# Patient Record
Sex: FEMALE | Race: WHITE
[De-identification: names, ages, dates, MRNs, and addresses within clinical notes are randomized per-mention and may not be internally consistent; named-entity substitution may affect disease eponyms.]

---

## 2017-01-04 ENCOUNTER — HOSPITAL ENCOUNTER (INPATIENT)
Dept: HOSPITAL 75 - LDRP | Age: 22
LOS: 2 days | Discharge: HOME | End: 2017-01-06
Attending: OBSTETRICS & GYNECOLOGY | Admitting: OBSTETRICS & GYNECOLOGY
Payer: MEDICAID

## 2017-01-04 VITALS — SYSTOLIC BLOOD PRESSURE: 125 MMHG | DIASTOLIC BLOOD PRESSURE: 83 MMHG

## 2017-01-04 VITALS — WEIGHT: 147 LBS | HEIGHT: 62 IN | BODY MASS INDEX: 27.05 KG/M2

## 2017-01-04 VITALS — DIASTOLIC BLOOD PRESSURE: 76 MMHG | SYSTOLIC BLOOD PRESSURE: 111 MMHG

## 2017-01-04 VITALS — SYSTOLIC BLOOD PRESSURE: 124 MMHG | DIASTOLIC BLOOD PRESSURE: 78 MMHG

## 2017-01-04 DIAGNOSIS — O41.03X0: Primary | ICD-10-CM

## 2017-01-04 DIAGNOSIS — Z3A.38: ICD-10-CM

## 2017-01-04 DIAGNOSIS — Z23: ICD-10-CM

## 2017-01-04 LAB
BASOPHILS # BLD AUTO: 0 10^3/UL (ref 0–0.1)
BASOPHILS NFR BLD AUTO: 0 % (ref 0–10)
EOSINOPHIL # BLD AUTO: 0.1 10^3/UL (ref 0–0.3)
EOSINOPHIL NFR BLD AUTO: 1 % (ref 0–10)
ERYTHROCYTE [DISTWIDTH] IN BLOOD BY AUTOMATED COUNT: 12.8 % (ref 10–14.5)
LYMPHOCYTES # BLD AUTO: 1.5 X 10^3 (ref 1–4)
LYMPHOCYTES NFR BLD AUTO: 15 % (ref 12–44)
MCH RBC QN AUTO: 30 PG (ref 25–34)
MCHC RBC AUTO-ENTMCNC: 35 G/DL (ref 32–36)
MCV RBC AUTO: 86 FL (ref 80–99)
MONOCYTES # BLD AUTO: 0.7 X 10^3 (ref 0–1)
MONOCYTES NFR BLD AUTO: 7 % (ref 0–12)
NEUTROPHILS # BLD AUTO: 8 X 10^3 (ref 1.8–7.8)
NEUTROPHILS NFR BLD AUTO: 78 % (ref 42–75)
PLATELET # BLD: 179 10^3/UL (ref 130–400)
PMV BLD AUTO: 11.1 FL (ref 7.4–10.4)
RBC # BLD AUTO: 4.18 10^6/UL (ref 4.35–5.85)
WBC # BLD AUTO: 10.3 10^3/UL (ref 4.3–11)

## 2017-01-04 PROCEDURE — 36415 COLL VENOUS BLD VENIPUNCTURE: CPT

## 2017-01-04 PROCEDURE — 85025 COMPLETE CBC W/AUTO DIFF WBC: CPT

## 2017-01-04 PROCEDURE — 94664 DEMO&/EVAL PT USE INHALER: CPT

## 2017-01-04 PROCEDURE — 86850 RBC ANTIBODY SCREEN: CPT

## 2017-01-04 PROCEDURE — 86901 BLOOD TYPING SEROLOGIC RH(D): CPT

## 2017-01-04 PROCEDURE — 86900 BLOOD TYPING SEROLOGIC ABO: CPT

## 2017-01-04 PROCEDURE — 90715 TDAP VACCINE 7 YRS/> IM: CPT

## 2017-01-04 RX ADMIN — DOCUSATE SODIUM SCH MG: 100 CAPSULE ORAL at 21:01

## 2017-01-04 RX ADMIN — KETOROLAC TROMETHAMINE SCH MG: 30 INJECTION, SOLUTION INTRAMUSCULAR; INTRAVENOUS at 13:00

## 2017-01-04 RX ADMIN — KETOROLAC TROMETHAMINE SCH MG: 30 INJECTION, SOLUTION INTRAMUSCULAR; INTRAVENOUS at 19:13

## 2017-01-04 RX ADMIN — OXYCODONE HYDROCHLORIDE AND ACETAMINOPHEN PRN TAB: 10; 325 TABLET ORAL at 21:02

## 2017-01-04 NOTE — XMS REPORT
Continuity of Care Document

 Created on: 2014



ALEJANDRINA SCHAEFFER

External Reference #: [id number]

: 1995

Sex: Female



Demographics







 Address  [address] [place] [address]

[place] [place]  [address]

 

 Home Phone  [phone]

 

 Preferred Language  Unknown

 

 Marital Status  Unknown

 

 Congregation Affiliation  Unknown

 

 Race  Unknown

 

 Ethnic Group  Unknown





Author







 Author  [facility]

 

 Organization  [facility]

 

 Address  Unknown

 

 Phone  Unavailable



                                                    



Problems

                    





 Problem                          Status                          Onset Date   
                       Classification                          Date Reported   
                       Comments                          Source                
    



                                                                        



Medications

                    





 Medication                          Details                          Route    
                      Status                          Patient Instructions     
                     Ordering Provider                          Order Date     
                     Source                    

 

 Donnatal Extentabs oral tablet, extended release                          1 tab
, PO, Q12H, # 30 tab, 0 Refill(s), [id number]            
                                        Inactive                               
                     [facility]                    

 

 Mircette biphasic oral tablet                          1 tab, PO, Daily, # 28 
tab, 11 Refill(s), Pharmacy [id number]                             
                       Active                                                  
  Formerly [facility]                    

 

 Claritin                          mg                                          
          Active                                                               
                                         [facility]                
    

 

 Singulair                          QPM                                        
            Active                                                             
                                           [facility]              
      



                                                                               
                                                         



Allergies, Adverse Reactions, Alerts

                    





 Substance                          Category                          Reaction 
                         Severity                          Reaction type       
                   Status                          Date Reported               
           Comments                          Source                    



                                                                



Immunizations

                    





 Immunization                          Date Given                          Site
                          Status                          Last Updated         
                 Comments                          Source                    



                                                                        



Results

                    





 Order Name                          Results                          Value    
                      Reference Range                          Date            
              Interpretation                          Comments                 
         Source                    

 

 Patient Viewable Results                          Rapid SS POC (Normal:Negative
)                          Negative 

</br>(2014 10:03:00) <sup> </sup>                                        
              2014                                                       
                       [facility]                    



                        

                                                                        



Vital Signs

                    





 Vital Sign                          Value                          Date       
                   Comments                          Source                    

 

 Diastolic BP                          68 mmHg                          2014                                                    [facility] 
                   

 

 Resp. Rate                          16 BRMIN                          2014                                                    [facility] 
                   

 

 Systolic BP                          110 mmHg                          2014                                                    [facility] 
                   

 

 Heart Rate                          118 bpm                          2014                                                    [facility] 
                   



                                                                               
                                                         



Encounters

                    





 Location                          Location Details                          
Encounter Type                          Encounter Number                       
   Reason For Visit                          Attending Provider                
          ADM Date                          DC Date                          
Status                          Source                    



                                                                        



Procedures

                    





 Procedure                          Code                          Date         
                 Perfomer                          Comments                    
      Source

## 2017-01-04 NOTE — HISTORY & PHYSICAL
History and Physical


this patient is a 20-year-old G1 white female with an EDC of 2070 

putting her at 38-4/7 weeks' gestation today.  She was seen in clinic yesterday 

and found with an NADINE of 48.  She is also been known to have a persistent 

breech presentation and was rachell breech on ultrasound yesterday.  She 

presented today for a scheduled  but was also found to be misha 

every 4-6 minutes.  She denies ruptured membranes or bleeding.  Her history is 

significant for having had B strep growing in her urine in the first trimester 

pregnancy





Allergies are none


Medications are prenatal vitamins


Past medical history, past surgical history, obstetric history, family history, 

and social histories are per the antepartum record





HEENT exam is normal


Neck is supple no lymphadenopathy no thyromegaly


Heart has regular rhythm no murmur


Chest is clear auscultation bilaterally


Abdomen is gravid soft nontender nondistended


Extreme show clubbing cyanosis.  There is no Homans sign.





Pelvic exam is deferred








 Laboratory Tests








Test


  17


09:35 Range/Units


 


 


Basophils # (Auto)


  0.0 


  0.0-0.1


10^3/uL


 


Basophils (%) (Auto) 0  0-10  %


 


Eosinophils # (Auto)


  0.1 


  0.0-0.3


10^3/uL


 


Eosinophils (%) (Auto) 1  0-10  %


 


Hematocrit 36  35-52  %


 


Hemoglobin 12.5  11.5-16.0  G/DL


 


Lymphocytes # (Auto) 1.5  1.0-4.0  X 10^3


 


Lymphocytes (%) (Auto) 15  12-44  %


 


Mean Corpuscular Hemoglobin 30  25-34  PG


 


Mean Corpuscular Hemoglobin


Concent 35 


  32-36  G/DL


 


 


Mean Corpuscular Volume 86  80-99  FL


 


Mean Platelet Volume 11.1 H 7.4-10.4  FL


 


Monocytes # (Auto) 0.7  0.0-1.0  X 10^3


 


Monocytes (%) (Auto) 7  0-12  %


 


Neutrophils # (Auto) 8.0 H 1.8-7.8  X 10^3


 


Neutrophils (%) (Auto) 78 H 42-75  %


 


Platelet Count


  179 


  130-400


10^3/uL


 


Red Blood Count


  4.18 L


  4.35-5.85


10^6/uL


 


Red Cell Distribution Width 12.8  10.0-14.5  %


 


White Blood Count


  10.3 


  4.3-11.0


10^3/uL





lab work is normal.





Assessment and plan   38-4/7 weeks' gestation with oligohydramnios and rachell 

breech presentation and the patient now in early labor.  Plan is for primary 

 delivery.  Surgical risk complication recovering follow-up have been 

fully explained with this patient on several occasions that showed pregnancy in 

breech almost around all of her questions are answered she is ready to proceed


38-4/7 weeks' gestation with oligohydramnios and breech presentation





Allergies and Home Medications


Allergies


Coded Allergies:  


     kiwi (Unverified  Allergy, Unknown, 14)





Home Medications


Ondansetron Hcl 4 Mg Tab #10 4 MG SL Q4H PRN PRN NAUSEA 


   FOR NAUSEA AND VOMITING 


   Prescribed by: RACH LARA on 4/18/15 1907


Trimethoprim/Sulfamethoxazole 1 Ea Tablet 7Days 1 EA PO BID 


   Prescribed by: RACH LARA on 4/18/15 1907





Clinical Quality Measures


DVT/VTE Risk/Contraindication:


Risk Factor Score Per Nursin


RFS Level Per Nursing on Admit:  1=Low/No VTE PPX








NORMAN NEAL MD 2017 12:22 pm

## 2017-01-05 VITALS — SYSTOLIC BLOOD PRESSURE: 115 MMHG | DIASTOLIC BLOOD PRESSURE: 71 MMHG

## 2017-01-05 VITALS — DIASTOLIC BLOOD PRESSURE: 79 MMHG | SYSTOLIC BLOOD PRESSURE: 120 MMHG

## 2017-01-05 VITALS — DIASTOLIC BLOOD PRESSURE: 67 MMHG | SYSTOLIC BLOOD PRESSURE: 110 MMHG

## 2017-01-05 VITALS — DIASTOLIC BLOOD PRESSURE: 68 MMHG | SYSTOLIC BLOOD PRESSURE: 111 MMHG

## 2017-01-05 VITALS — SYSTOLIC BLOOD PRESSURE: 102 MMHG | DIASTOLIC BLOOD PRESSURE: 62 MMHG

## 2017-01-05 VITALS — SYSTOLIC BLOOD PRESSURE: 115 MMHG | DIASTOLIC BLOOD PRESSURE: 70 MMHG

## 2017-01-05 RX ADMIN — KETOROLAC TROMETHAMINE SCH MG: 30 INJECTION, SOLUTION INTRAMUSCULAR; INTRAVENOUS at 06:47

## 2017-01-05 RX ADMIN — OXYCODONE HYDROCHLORIDE AND ACETAMINOPHEN PRN TAB: 10; 325 TABLET ORAL at 23:42

## 2017-01-05 RX ADMIN — IBUPROFEN SCH MG: 800 TABLET, FILM COATED ORAL at 17:12

## 2017-01-05 RX ADMIN — OXYCODONE HYDROCHLORIDE AND ACETAMINOPHEN PRN TAB: 10; 325 TABLET ORAL at 11:35

## 2017-01-05 RX ADMIN — DOCUSATE SODIUM SCH MG: 100 CAPSULE ORAL at 08:19

## 2017-01-05 RX ADMIN — IBUPROFEN SCH MG: 800 TABLET, FILM COATED ORAL at 11:35

## 2017-01-05 RX ADMIN — OXYCODONE HYDROCHLORIDE AND ACETAMINOPHEN PRN TAB: 10; 325 TABLET ORAL at 20:25

## 2017-01-05 RX ADMIN — OXYCODONE HYDROCHLORIDE AND ACETAMINOPHEN PRN TAB: 10; 325 TABLET ORAL at 05:19

## 2017-01-05 RX ADMIN — KETOROLAC TROMETHAMINE SCH MG: 30 INJECTION, SOLUTION INTRAMUSCULAR; INTRAVENOUS at 01:34

## 2017-01-05 RX ADMIN — DOCUSATE SODIUM SCH MG: 100 CAPSULE ORAL at 21:35

## 2017-01-05 RX ADMIN — OXYCODONE HYDROCHLORIDE AND ACETAMINOPHEN PRN TAB: 10; 325 TABLET ORAL at 16:11

## 2017-01-05 RX ADMIN — IBUPROFEN SCH MG: 800 TABLET, FILM COATED ORAL at 23:42

## 2017-01-05 NOTE — OPERATIVE REPORT
PROCEDURE PHYSICIAN:   NORMAN NEAL

 

DATE OF PROCEDURE:  

2017

 

DATE OF DICTATION: 

2017

 

PREOPERATIVE DIAGNOSIS:

38-4/7 weeks gestation with oligohydramnios and breech

presentation. 

 

POSTOPERATIVE DIAGNOSIS: 

38-4/7 weeks gestation with oligohydramnios and breech

presentation.

 

OPERATIVE PROCEDURE:

Primary low transverse  delivery of a viable male infant

with Apgars of 8 and 9 at one and five minutes respectfully.

Weight was 7 pounds, 1 ounce. Cord blood pH was 7.32. Birth time

was 1243. 

 

OPERATIVE DESCRIPTION: 

With the patient in supine position, under satisfactory spinal

anesthesia, she was prepped and draped in the usual fashion for

abdominal surgery. Elena cath was placed in the urinary bladder.

 

 

A Pfannenstiel incision made through the skin with scalpel. The

patient's abdomen entered in the usual manner. Bladder retractor

placed in position, clean scalpel used to make a 4 cm hysterotomy

incision transversely across lower uterine segment. Scant clear

fluid was released on hysterotomy. That incision was extended by

blunt dissection and then a viable male infant was delivered via

the uterine incision from extraction from a rachell breech

presentation. The infant was bulb suctioned on delivery. The cord

was doubly clamped and cut and infant passed to Isabell Stacy, the

pediatric nurse in attendance for delivery. 

 

Cord bloods were obtained including a pH that had a value of

7.32. The placenta then delivered spontaneously Gardner. It was

normal with three-vessel cord. The uterus was exteriorized, the

interior wiped clean with a wet laparotomy sponge. Uterine

incision was then closed with running lock suture of 2-0 Vicryl.

Hemostasis was complete. The uterus was returned to the abdominal

cavity. All blood clot and debris was removed from the abdominal

cavity. With sponge and needle counts correct and hemostasis

assured, the anterior parietal peritoneum and then the rectus

muscles were closed with running suture of 2-0 Vicryl. The rectus

fascia was closed with 2-0 Vicryl. Subcutaneous tissue was closed

with 2-0 Vicryl and the skin was stapled. 

 

Sponge and needle counts were correct at the end of procedure.

Estimated blood loss for the procedure was around 400 mL.  The

patient tolerated the procedure well and was transferred to the

recovery room in stable condition. The infant had been taken

stable to the full term nursery in the care of nurse Stacy. 

 

 

 

Job ID: [id number]

Dictated Date: 2017 13:02:06 

Transcription Date: 2017 12:13:09 / yusra

## 2017-01-05 NOTE — ANESTHESIA-REGIONAL POST-OP
Regional


Patient Condition


Mental Status:  Alert, Oriented x3


Circulation:  Same as Pre-Op


Headache:  Absent


Sensation:  Full Recovery


Motor Block:  Absent





Post Op Complications


Complications


None





Follow Up Care/Instructions


Patient Instructions


None needed.





Anesthesia/Patient Condition


Patient is doing well, no complaints, stable vital signs, no apparent adverse 

anesthesia problems.   


No complications reported per nursing.








MAKEDA AQUINO CRNA Jan 5, 2017 09:35

## 2017-01-05 NOTE — PROGRESS NOTE-STANDARD
Standard Progress Note


Progress Notes/Assess & Plan


Progress/Assessment & Plan


this patient is without complaint.  She is ambulating, she is voiding, 

tolerating by mouth well, has good pain control.  Patient denies chest pain, 

denies shortness of breath, denies nausea vomiting, denies headache.








 Vital Signs








  Date Time  Temp Pulse Resp B/P Pulse Ox O2 Delivery O2 Flow Rate FiO2


 


17 05:20 97.7 107 18 102/62 98 Room Air  


 


17 01:35 98.0 96 18 110/67 98 Room Air  


 


17 20:20 97.6 103 18 111/76 98 Room Air  


 


17 15:40 97.9 93 18 124/78 99 Room Air  


 


17 09:30 98.0 101 18 125/83  Room Air  














 I & O 


 


 17





 07:00


 


Intake Total 4945 ml


 


Output Total 1950 ml


 


Balance 2995 ml





vital signs are stable.  Patient afebrile.





Fundus is firm below the umbilicus and minimally tender.  The incision is clean 

dry and intact.


Extremities show no clubbing cyanosis.  There is no Homans sign.  There is 

slight pretibial pitting edema that is normal.





Assessment and plan   postoperative day number 1 status post primary  

doing well.  We'll plan for routine convalescence care today and consider 

discharge home tomorrow








NORMAN NEAL MD 2017 07:21

## 2017-01-05 NOTE — DISCHARGE INSTRUCTIONS
Discharge Instructions


Discharge Medications


New, Converted or Re-Newed RX:  RX on Chart





Patient Instructions


Patient Instructions:  


as directed


Return to The Hospital For:  


as directed





Activity & Diet


Discharge Diet:  No Restrictions


Activity as Tolerated:  No





Orders-Post D/C & Referrals





Follow Up Appt:


RTC 1 week for incision check.


Call to make follow up appt. for patient in 4 weeks.





Wound Care:


Remove staples, apply benzoin and steri strips.





Activity 


Per routine post  instructions.





Please call in RX to patient pharmacy.





Diet as tolerated





Patient may shower or tub bathe as desired.





Continue home meds








NORMAN NEAL MD 2017 07:23

## 2017-01-06 VITALS — DIASTOLIC BLOOD PRESSURE: 74 MMHG | SYSTOLIC BLOOD PRESSURE: 113 MMHG

## 2017-01-06 VITALS — SYSTOLIC BLOOD PRESSURE: 117 MMHG | DIASTOLIC BLOOD PRESSURE: 76 MMHG

## 2017-01-06 RX ADMIN — DOCUSATE SODIUM SCH MG: 100 CAPSULE ORAL at 12:05

## 2017-01-06 RX ADMIN — IBUPROFEN SCH MG: 800 TABLET, FILM COATED ORAL at 12:05

## 2017-01-06 RX ADMIN — IBUPROFEN SCH MG: 800 TABLET, FILM COATED ORAL at 06:01

## 2017-01-06 RX ADMIN — OXYCODONE HYDROCHLORIDE AND ACETAMINOPHEN PRN TAB: 10; 325 TABLET ORAL at 06:02

## 2017-01-06 NOTE — PROGRESS NOTE-STANDARD
Standard Progress Note


Progress Notes/Assess & Plan


Progress/Assessment & Plan


this patient is without complaint.  She is ambulating, she is voiding, 

tolerating by mouth well, has good pain control.  Patient denies chest pain, 

denies shortness of breath, denies nausea vomiting, denies headache.








 Vital Signs








  Date Time  Temp Pulse Resp B/P Pulse Ox O2 Delivery O2 Flow Rate FiO2


 


17 05:20 97.7 107 18 102/62 98 Room Air  


 


17 01:35 98.0 96 18 110/67 98 Room Air  


 


17 20:20 97.6 103 18 111/76 98 Room Air  


 


17 15:40 97.9 93 18 124/78 99 Room Air  


 


17 09:30 98.0 101 18 125/83  Room Air  














 I & O 


 


 17





 07:00


 


Intake Total 4945 ml


 


Output Total 1950 ml


 


Balance 2995 ml





vital signs are stable.  Patient afebrile.





Fundus is firm below the umbilicus and minimally tender.  The incision is clean 

dry and intact.


Extremities show no clubbing cyanosis.  There is no Homans sign.  There is 

slight pretibial pitting edema that is normal.





Assessment and plan   postoperative day number 1 status post primary  

doing well.  We'll plan for routine convalescence care today and consider 

discharge home tomorrow





2017


Patient is without complaint.  She is ambulating, voiding, tolerating by mouth 

well, has good pain control, denies chest pain, denies shortness of breath, 

denies nausea vomiting, and is requesting discharge home.








 Vital Signs








  Date Time  Temp Pulse Resp B/P Pulse Ox O2 Delivery O2 Flow Rate FiO2


 


17 04:20 97.3 108 18 117/76 99 Room Air  


 


17 21:30 97.8 103 18 115/70 98 Room Air  


 


17 17:12 97.9 107 18 120/79 99 Room Air  


 


17 11:30 97.8 103 18 111/68 99 Room Air  














 I & O 


 


 17





 07:00


 


Intake Total 780 ml


 


Output Total 3150 ml


 


Balance -2370 ml





vital signs are stable.  Patient is afebrile.





Fundus is firm below the umbilicus and nontender.  There is minimal tympany.  

There are bowel sounds present in all 4 quadrants. the incision is clean dry 

and intact


Extremities show clubbing cyanosis.  There is no Homans sign.  There is some 

pretibial pitting edema that is normal.





assessment and plan   is operative day number 2 status post primary  

doing well.  Plan is for discharge home with follow-up in clinic.


Final Diagnosis


38-4/7 weeks' gestation primary  delivery








NORMAN NEAL MD 2017 7:28 am

## 2018-07-03 ENCOUNTER — HOSPITAL ENCOUNTER (EMERGENCY)
Dept: HOSPITAL 75 - ER | Age: 23
Discharge: HOME | End: 2018-07-03
Payer: MEDICAID

## 2018-07-03 VITALS — SYSTOLIC BLOOD PRESSURE: 149 MMHG | DIASTOLIC BLOOD PRESSURE: 73 MMHG

## 2018-07-03 VITALS — BODY MASS INDEX: 17.66 KG/M2 | WEIGHT: 96 LBS | HEIGHT: 62 IN

## 2018-07-03 DIAGNOSIS — N83.291: ICD-10-CM

## 2018-07-03 DIAGNOSIS — O20.0: Primary | ICD-10-CM

## 2018-07-03 DIAGNOSIS — O34.80: ICD-10-CM

## 2018-07-03 DIAGNOSIS — Z3A.00: ICD-10-CM

## 2018-07-03 LAB
ALBUMIN SERPL-MCNC: 5 GM/DL (ref 3.2–4.5)
ALP SERPL-CCNC: 51 U/L (ref 40–136)
ALT SERPL-CCNC: 12 U/L (ref 0–55)
APTT PPP: YELLOW S
BACTERIA #/AREA URNS HPF: NEGATIVE /HPF
BASOPHILS # BLD AUTO: 0 10^3/UL (ref 0–0.1)
BASOPHILS NFR BLD AUTO: 0 % (ref 0–10)
BILIRUB SERPL-MCNC: 1 MG/DL (ref 0.1–1)
BILIRUB UR QL STRIP: NEGATIVE
BUN/CREAT SERPL: 9
CALCIUM SERPL-MCNC: 9.9 MG/DL (ref 8.5–10.1)
CHLORIDE SERPL-SCNC: 106 MMOL/L (ref 98–107)
CO2 SERPL-SCNC: 24 MMOL/L (ref 21–32)
CREAT SERPL-MCNC: 0.68 MG/DL (ref 0.6–1.3)
EOSINOPHIL # BLD AUTO: 0.2 10^3/UL (ref 0–0.3)
EOSINOPHIL NFR BLD AUTO: 3 % (ref 0–10)
ERYTHROCYTE [DISTWIDTH] IN BLOOD BY AUTOMATED COUNT: 12.5 % (ref 10–14.5)
FIBRINOGEN PPP-MCNC: CLEAR MG/DL
GFR SERPLBLD BASED ON 1.73 SQ M-ARVRAT: > 60 ML/MIN
GLUCOSE SERPL-MCNC: 94 MG/DL (ref 70–105)
GLUCOSE UR STRIP-MCNC: NEGATIVE MG/DL
HCT VFR BLD CALC: 43 % (ref 35–52)
HGB BLD-MCNC: 15.2 G/DL (ref 11.5–16)
KETONES UR QL STRIP: (no result)
LEUKOCYTE ESTERASE UR QL STRIP: (no result)
LYMPHOCYTES # BLD AUTO: 1.4 X 10^3 (ref 1–4)
LYMPHOCYTES NFR BLD AUTO: 22 % (ref 12–44)
MANUAL DIFFERENTIAL PERFORMED BLD QL: NO
MCH RBC QN AUTO: 31 PG (ref 25–34)
MCHC RBC AUTO-ENTMCNC: 35 G/DL (ref 32–36)
MCV RBC AUTO: 87 FL (ref 80–99)
MONOCYTES # BLD AUTO: 0.5 X 10^3 (ref 0–1)
MONOCYTES NFR BLD AUTO: 9 % (ref 0–12)
NEUTROPHILS # BLD AUTO: 4.3 X 10^3 (ref 1.8–7.8)
NEUTROPHILS NFR BLD AUTO: 67 % (ref 42–75)
NITRITE UR QL STRIP: NEGATIVE
PH UR STRIP: 7 [PH] (ref 5–9)
PLATELET # BLD: 271 10^3/UL (ref 130–400)
PMV BLD AUTO: 10.8 FL (ref 7.4–10.4)
POTASSIUM SERPL-SCNC: 3.5 MMOL/L (ref 3.6–5)
PROT SERPL-MCNC: 7.5 GM/DL (ref 6.4–8.2)
PROT UR QL STRIP: NEGATIVE
RBC # BLD AUTO: 4.96 10^6/UL (ref 4.35–5.85)
RBC #/AREA URNS HPF: (no result) /HPF
SODIUM SERPL-SCNC: 140 MMOL/L (ref 135–145)
SP GR UR STRIP: 1.01 (ref 1.02–1.02)
SQUAMOUS #/AREA URNS HPF: (no result) /HPF
UROBILINOGEN UR-MCNC: NORMAL MG/DL
WBC # BLD AUTO: 6.3 10^3/UL (ref 4.3–11)
WBC #/AREA URNS HPF: (no result) /HPF

## 2018-07-03 PROCEDURE — 80053 COMPREHEN METABOLIC PANEL: CPT

## 2018-07-03 PROCEDURE — 84702 CHORIONIC GONADOTROPIN TEST: CPT

## 2018-07-03 PROCEDURE — 84703 CHORIONIC GONADOTROPIN ASSAY: CPT

## 2018-07-03 PROCEDURE — 36415 COLL VENOUS BLD VENIPUNCTURE: CPT

## 2018-07-03 PROCEDURE — 85025 COMPLETE CBC W/AUTO DIFF WBC: CPT

## 2018-07-03 PROCEDURE — 76817 TRANSVAGINAL US OBSTETRIC: CPT

## 2018-07-03 PROCEDURE — 81000 URINALYSIS NONAUTO W/SCOPE: CPT

## 2018-07-03 NOTE — ED ABDOMINAL PAIN
General


Chief Complaint:  -Female


Stated Complaint:  POSSIBLY PG,ABD CRAMPING


Source of Information:  Patient


Exam Limitations:  No Limitations





History of Present Illness


Date Seen by Provider:  Jul 3, 2018


Time Seen by Provider:  10:53


Initial Comments


to ER with concerns of possible miscarriage. Her last menstrual period was on 

May 27. Today she started having suprapubic abdominal cramping without vaginal 

discharge. She denies fevers or chills. .  She's had 3 positive home 

pregnancy test.she also reports loose stools for the past couple of days which 

she attributes to irritable bowel from stress.


Timing/Duration:  1-2 Days


Severity/Quality:  Moderate


Location:  Suprapubic


Radiation:  No Radiation


Activities at Onset:  None





Allergies and Home Medications


Allergies


Coded Allergies:  


     kiwi (Unverified  Allergy, Unknown, 14)





Home Medications


Pnv95/Ferrous Fumarate/FA 1 Each Tablet, 1 EACH PO DAILY, (Reported)





Patient Home Medication List


Home Medication List Reviewed:  Yes





Review of Systems


Constitutional:  see HPI


EENTM:  No Symptoms Reported


Respiratory:  No Symptoms Reported


Cardiovascular:  No Symptoms Reported


Gastrointestinal:  See HPI, Abdominal Pain


Musculoskeletal:  no symptoms reported


Skin:  no symptoms reported


Psychiatric/Neurological:  No Symptoms Reported





Past Medical-Social-Family Hx


Patient Social History


Alcohol Use:  Denies Use


Recreational Drug Use:  No


Smoking Status:  Never a Smoker


Recent Foreign Travel:  No


Contact w/Someone Who Travel:  No


Recent Hopitalizations:  No





Immunizations Up To Date


PED Vaccines UTD:  No





Seasonal Allergies


Seasonal Allergies:  Yes





Past Medical History


Surgeries:  Yes


Adenoidectomy, Tonsillectomy


Respiratory:  No


Cardiac:  No


Neurological:  No


Reproductive Disorders:  No


Female Reproductive Disorders:  Denies


Sexually Transmitted Disease:  No


HIV/AIDS:  No


Gastrointestinal:  No


Musculoskeletal:  No


Endocrine:  No


HEENT:  No


Cancer:  No


Psychosocial:  No


Integumentary:  No


Blood Disorders:  No


Adverse Reaction/Blood Tranf:  No





Family Medical History





Not obtainable due to adoption





Physical Exam


Vital Signs





Vital Signs - First Documented








 7/3/18





 10:36


 


Pulse 96


 


Resp 18


 


B/P (MAP) 149/73 (98)


 


Pulse Ox 96


 


O2 Delivery Room Air





Capillary Refill :


General Appearance:  WD/WN, no apparent distress


HEENT:  PERRL/EOMI, normal ENT inspection


Neck:  non-tender, full range of motion


Respiratory:  no respiratory distress, no accessory muscle use


Gastrointestinal:  normal bowel sounds, soft, tenderness (mild suprapubic)


Extremities:  normal range of motion, non-tender


Neurologic/Psychiatric:  alert, normal mood/affect, oriented x 3


Skin:  normal color, warm/dry





Progress/Results/Core Measures


Results/Orders


Lab Results





Laboratory Tests








Test


 7/3/18


10:49 7/3/18


10:55 Range/Units


 


 


White Blood Count


 6.3 


 


 4.3-11.0


10^3/uL


 


Red Blood Count


 4.96 


 


 4.35-5.85


10^6/uL


 


Hemoglobin 15.2   11.5-16.0  G/DL


 


Hematocrit 43   35-52  %


 


Mean Corpuscular Volume 87   80-99  FL


 


Mean Corpuscular Hemoglobin 31   25-34  PG


 


Mean Corpuscular Hemoglobin


Concent 35 


 


 32-36  G/DL





 


Red Cell Distribution Width 12.5   10.0-14.5  %


 


Platelet Count


 271 


 


 130-400


10^3/uL


 


Mean Platelet Volume 10.8 H  7.4-10.4  FL


 


Neutrophils (%) (Auto) 67   42-75  %


 


Lymphocytes (%) (Auto) 22   12-44  %


 


Monocytes (%) (Auto) 9   0-12  %


 


Eosinophils (%) (Auto) 3   0-10  %


 


Basophils (%) (Auto) 0   0-10  %


 


Neutrophils # (Auto) 4.3   1.8-7.8  X 10^3


 


Lymphocytes # (Auto) 1.4   1.0-4.0  X 10^3


 


Monocytes # (Auto) 0.5   0.0-1.0  X 10^3


 


Eosinophils # (Auto)


 0.2 


 


 0.0-0.3


10^3/uL


 


Basophils # (Auto)


 0.0 


 


 0.0-0.1


10^3/uL


 


Sodium Level 140   135-145  MMOL/L


 


Potassium Level 3.5 L  3.6-5.0  MMOL/L


 


Chloride Level 106     MMOL/L


 


Carbon Dioxide Level 24   21-32  MMOL/L


 


Anion Gap 10   5-14  MMOL/L


 


Blood Urea Nitrogen 6 L  7-18  MG/DL


 


Creatinine


 0.68 


 


 0.60-1.30


MG/DL


 


Estimat Glomerular Filtration


Rate > 60 


 


  





 


BUN/Creatinine Ratio 9    


 


Glucose Level 94     MG/DL


 


Calcium Level 9.9   8.5-10.1  MG/DL


 


Total Bilirubin 1.0   0.1-1.0  MG/DL


 


Aspartate Amino Transf


(AST/SGOT) 16 


 


 5-34  U/L





 


Alanine Aminotransferase


(ALT/SGPT) 12 


 


 0-55  U/L





 


Alkaline Phosphatase 51     U/L


 


Total Protein 7.5   6.4-8.2  GM/DL


 


Albumin 5.0 H  3.2-4.5  GM/DL


 


Human Chorionic Gonadotropin,


Quant 1076 H


 


 <5  MIU/ML





 


Urine Color  YELLOW   


 


Urine Clarity  CLEAR   


 


Urine pH  7  5-9  


 


Urine Specific Gravity  1.010 L 1.016-1.022  


 


Urine Protein  NEGATIVE  NEGATIVE  


 


Urine Glucose (UA)  NEGATIVE  NEGATIVE  


 


Urine Ketones  3+ H NEGATIVE  


 


Urine Nitrite  NEGATIVE  NEGATIVE  


 


Urine Bilirubin  NEGATIVE  NEGATIVE  


 


Urine Urobilinogen  NORMAL  NORMAL  MG/DL


 


Urine Leukocyte Esterase  1+ H NEGATIVE  


 


Urine RBC (Auto)  NEGATIVE  NEGATIVE  


 


Urine RBC  NONE   /HPF


 


Urine WBC  RARE   /HPF


 


Urine Squamous Epithelial


Cells 


 0-2 


  /HPF





 


Urine Crystals  NONE   /LPF


 


Urine Bacteria  NEGATIVE   /HPF


 


Urine Casts  NONE   /LPF


 


Urine Mucus  NEGATIVE   /LPF


 


Urine Culture Indicated  NO   








My Orders





Orders - RACH LARA


Cbc With Automated Diff (7/3/18 10:39)


Hcg,Quantitative (7/3/18 10:39)


Comprehensive Metabolic Panel (7/3/18 10:39)


Ua Culture If Indicated (7/3/18 10:39)


Urine Pregnancy Bedside (7/3/18 10:39)


Us Ob Transvaginal [id number] (7/3/18 11:03)





Vital Signs/I&O











 7/3/18





 10:36


 


Pulse 96


 


Resp 18


 


B/P (MAP) 149/73 (98)


 


Pulse Ox 96


 


O2 Delivery Room Air











Departure


Impression





 Primary Impression:  


 Ovarian cyst


 Additional Impression:  


 Threatened  in early pregnancy


Disposition:  01 HOME, SELF-CARE


Condition:  Stable





Departure-Patient Inst.


Decision time for Depature:  11:57


Referrals:  


THEA COOK DO (PCP)


Primary Care Physician








[facility] (Family)


Primary Care Physician


Patient Instructions:  Threatened Miscarriage (DC)





Add. Discharge Instructions:  


. Follow-up with your doctor as scheduled for repeat labs (HCG) at your 

obstetricians discretion


All discharge instructions reviewed with patient and/or family. Voiced 

understanding.











RACH LARA Jul 3, 2018 10:55

## 2018-07-03 NOTE — DIAGNOSTIC IMAGING REPORT
INDICATION: 

Cramping.



TECHNIQUE: 

--------------------



FINDINGS:

The endometrium is thickened at 17 mm. There is a tiny hypoechoic

structure in the endometrium measuring approximately 4 mm in

size. This is indeterminate but could conceivably represent a

very early gestational sac. No fetal pole is seen at this time.

The left ovary measures 1.5 x 1.9 x 2.4 cm. The right ovary

measures 4.4 x 3.2 x 4.6 cm. There is a 2.5 x 3.3 cm simple right

ovarian cyst. No free fluid is seen.



IMPRESSION:

1. There is a 3.3 cm right ovarian cyst. 



2. A tiny hypoechoic structure within the endometrium is too

small to characterize. It is conceivable that this represents a

very early gestational sac. Correlation with Beta hCG levels is

recommended. A followup ultrasound could be performed to confirm

viability.



Dictated by: 



  Dictated on workstation # [id number]

## 2018-07-05 ENCOUNTER — HOSPITAL ENCOUNTER (OUTPATIENT)
Dept: HOSPITAL 75 - LAB | Age: 23
End: 2018-07-05
Attending: OBSTETRICS & GYNECOLOGY
Payer: MEDICAID

## 2018-07-05 DIAGNOSIS — Z3A.01: ICD-10-CM

## 2018-07-05 DIAGNOSIS — R10.2: Primary | ICD-10-CM

## 2018-07-05 PROCEDURE — 84702 CHORIONIC GONADOTROPIN TEST: CPT

## 2018-07-05 PROCEDURE — 84144 ASSAY OF PROGESTERONE: CPT

## 2018-07-05 PROCEDURE — 36415 COLL VENOUS BLD VENIPUNCTURE: CPT

## 2018-07-07 ENCOUNTER — HOSPITAL ENCOUNTER (EMERGENCY)
Dept: HOSPITAL 75 - ER | Age: 23
Discharge: HOME | End: 2018-07-07
Payer: MEDICAID

## 2018-07-07 VITALS — WEIGHT: 96 LBS | BODY MASS INDEX: 17.66 KG/M2 | HEIGHT: 62 IN

## 2018-07-07 VITALS — SYSTOLIC BLOOD PRESSURE: 119 MMHG | DIASTOLIC BLOOD PRESSURE: 85 MMHG

## 2018-07-07 DIAGNOSIS — Z87.59: ICD-10-CM

## 2018-07-07 DIAGNOSIS — Z90.89: ICD-10-CM

## 2018-07-07 DIAGNOSIS — Z3A.01: ICD-10-CM

## 2018-07-07 DIAGNOSIS — O20.0: Primary | ICD-10-CM

## 2018-07-07 LAB
ALBUMIN SERPL-MCNC: 4.8 GM/DL (ref 3.2–4.5)
ALP SERPL-CCNC: 50 U/L (ref 40–136)
ALT SERPL-CCNC: 9 U/L (ref 0–55)
BASOPHILS # BLD AUTO: 0 10^3/UL (ref 0–0.1)
BASOPHILS NFR BLD AUTO: 1 % (ref 0–10)
BILIRUB SERPL-MCNC: 0.7 MG/DL (ref 0.1–1)
BUN/CREAT SERPL: 11
CALCIUM SERPL-MCNC: 9.5 MG/DL (ref 8.5–10.1)
CHLORIDE SERPL-SCNC: 107 MMOL/L (ref 98–107)
CO2 SERPL-SCNC: 25 MMOL/L (ref 21–32)
CREAT SERPL-MCNC: 0.73 MG/DL (ref 0.6–1.3)
EOSINOPHIL # BLD AUTO: 0.2 10^3/UL (ref 0–0.3)
EOSINOPHIL NFR BLD AUTO: 4 % (ref 0–10)
ERYTHROCYTE [DISTWIDTH] IN BLOOD BY AUTOMATED COUNT: 12.5 % (ref 10–14.5)
GFR SERPLBLD BASED ON 1.73 SQ M-ARVRAT: > 60 ML/MIN
GLUCOSE SERPL-MCNC: 124 MG/DL (ref 70–105)
HCT VFR BLD CALC: 39 % (ref 35–52)
HGB BLD-MCNC: 13.8 G/DL (ref 11.5–16)
LYMPHOCYTES # BLD AUTO: 1.9 X 10^3 (ref 1–4)
LYMPHOCYTES NFR BLD AUTO: 32 % (ref 12–44)
MANUAL DIFFERENTIAL PERFORMED BLD QL: NO
MCH RBC QN AUTO: 31 PG (ref 25–34)
MCHC RBC AUTO-ENTMCNC: 35 G/DL (ref 32–36)
MCV RBC AUTO: 87 FL (ref 80–99)
MONOCYTES # BLD AUTO: 0.6 X 10^3 (ref 0–1)
MONOCYTES NFR BLD AUTO: 11 % (ref 0–12)
NEUTROPHILS # BLD AUTO: 3.3 X 10^3 (ref 1.8–7.8)
NEUTROPHILS NFR BLD AUTO: 54 % (ref 42–75)
PLATELET # BLD: 269 10^3/UL (ref 130–400)
PMV BLD AUTO: 10.8 FL (ref 7.4–10.4)
POTASSIUM SERPL-SCNC: 3.3 MMOL/L (ref 3.6–5)
PROT SERPL-MCNC: 7 GM/DL (ref 6.4–8.2)
RBC # BLD AUTO: 4.53 10^6/UL (ref 4.35–5.85)
SODIUM SERPL-SCNC: 141 MMOL/L (ref 135–145)
WBC # BLD AUTO: 6.1 10^3/UL (ref 4.3–11)

## 2018-07-07 PROCEDURE — 84702 CHORIONIC GONADOTROPIN TEST: CPT

## 2018-07-07 PROCEDURE — 85025 COMPLETE CBC W/AUTO DIFF WBC: CPT

## 2018-07-07 PROCEDURE — 84703 CHORIONIC GONADOTROPIN ASSAY: CPT

## 2018-07-07 PROCEDURE — 36415 COLL VENOUS BLD VENIPUNCTURE: CPT

## 2018-07-07 PROCEDURE — 99283 EMERGENCY DEPT VISIT LOW MDM: CPT

## 2018-07-07 PROCEDURE — 80053 COMPREHEN METABOLIC PANEL: CPT

## 2018-07-07 NOTE — ED GU-FEMALE
General


Chief Complaint:  -Female


Stated Complaint:  5 WEEKS PREGNANT AND STARTED BLEEDING


Nursing Triage Note:  


pt reports she started having vag bleeding while [place] the mall.  reports she is 5 


weeks pregnant.


Nursing Sepsis Screen:  No Definite Risk





History of Present Illness


Date Seen by Provider:  2018


Time Seen by Provider:  16:50


Initial Comments


22-year-old  female presents for vaginal bleeding that began 10-20 min 

PTA.  Her last menstrual period was May 27, 2018. She was evaluated here on 7/3/

18 and had a positive hCG with a quantitative of 1,902. Vaginal ultrasound 

showed possible, very early intrau-uterine pregnancy with Right 3.3 ovarian 

cyst.  She reports minimal abdominal cramping with the vaginal bleeding.


Timing/Duration:  just prior to arrival


Severity/Quality:  mild


Location:  suprapubic


Radiation:  none


Activities at Onset:  none


Prior Genitourinary Problems:  none


Modifying Factors:  Improves With Resting


Associated Symptoms:  denies symptoms





Allergies and Home Medications


Allergies


Coded Allergies:  


     kiwi (Unverified  Allergy, Unknown, 14)





Home Medications


Pnv95/Ferrous Fumarate/FA 1 Each Tablet, 1 EACH PO DAILY, (Reported)





Patient Home Medication List


Home Medication List Reviewed:  Yes





Review of Systems


Constitutional:  no symptoms reported, see HPI


Genitourinary:  see HPI, discharge (vaginal bleeding)


Pregnant:  Yes


LMP:  May 27, 2018


All Other Systemes Reviewed


Negative Unless Noted:  Yes





Past Medical-Social-Family Hx


Past Med/Social Hx:  Reviewed Nursing Past Med/Soc Hx


Patient Social History


Alcohol Use:  Denies Use


Recreational Drug Use:  No


Smoking Status:  Never a Smoker


Recent Foreign Travel:  No


Contact w/Someone Who Travel:  No


Recent Infectious Disease Expo:  No


Recent Hopitalizations:  No


Physical Abuse:  No


Sexual Abuse:  No


Mistreated:  No


Fear:  No





Immunizations Up To Date


PED Vaccines UTD:  No





Seasonal Allergies


Seasonal Allergies:  Yes





Past Medical History


Surgeries:  Yes (wisdom teeth)


Adenoidectomy,  Section, Tonsillectomy


Respiratory:  No


Cardiac:  No


Neurological:  No


Reproductive Disorders:  No


Female Reproductive Disorders:  Denies


Sexually Transmitted Disease:  No


HIV/AIDS:  No


Gastrointestinal:  No


Musculoskeletal:  No


Endocrine:  No


HEENT:  No


Cancer:  No


Psychosocial:  No


Nursing Suicide Risk Score:  0


Integumentary:  No


Blood Disorders:  No


Adverse Reaction/Blood Tranf:  No





Family Medical History





Not obtainable due to adoption





Physical Exam


Vital Signs





Vital Signs - First Documented








 18





 16:50


 


Temp 99.9


 


Pulse 127


 


Resp 20


 


B/P (MAP) 140/85 (103)


 


Pulse Ox 98


 


O2 Delivery Room Air





Capillary Refill : Less Than 3 Seconds


Height, Weight, BMI


Height: 5', 2.00"


Weight: 96lbs 0.0oz, 43.662116mk Method:Stated ,26.9BMI


General Appearance:  WD/WN, mild distress (patient anxious regarding vaginal 

bleeding and possible miscarriage, tearful)


Cardiovascular:  normal peripheral pulses, regular rate, rhythm


Respiratory:  chest non-tender, lungs clear, normal breath sounds


Gastrointestinal:  normal bowel sounds, soft, tenderness (trace superpubic 

tenderness)


Extremities:  normal range of motion, non-tender, normal capillary refill


Neurologic/Psychiatric:  no motor/sensory deficits, alert, oriented x 3, 

depressed affect


Skin:  normal color, warm/dry





Progress/Results/Core Measures


Suspected Sepsis


Recent Fever Within 48 Hours:  No


Infection Criteria Present:  None


New/Unexplained  Altered Menta:  No


Sepsis Screen:  No Definite Risk


SIRS


Temperature:99.9 


Pulse: 127 


Respiratory Rate: 20


 


Laboratory Tests


18 17:05: White Blood Count 6.1


Blood Pressure 140 /85 


Mean: 103


 


Laboratory Tests


18 17:05: 


Creatinine 0.73, Platelet Count 269, Total Bilirubin 0.7








Results/Orders


Lab Results





Laboratory Tests








Test


 18


17:05 Range/Units


 


 


White Blood Count


 6.1 


 4.3-11.0


10^3/uL


 


Red Blood Count


 4.53 


 4.35-5.85


10^6/uL


 


Hemoglobin 13.8  11.5-16.0  G/DL


 


Hematocrit 39  35-52  %


 


Mean Corpuscular Volume 87  80-99  FL


 


Mean Corpuscular Hemoglobin 31  25-34  PG


 


Mean Corpuscular Hemoglobin


Concent 35 


 32-36  G/DL





 


Red Cell Distribution Width 12.5  10.0-14.5  %


 


Platelet Count


 269 


 130-400


10^3/uL


 


Mean Platelet Volume 10.8 H 7.4-10.4  FL


 


Neutrophils (%) (Auto) 54  42-75  %


 


Lymphocytes (%) (Auto) 32  12-44  %


 


Monocytes (%) (Auto) 11  0-12  %


 


Eosinophils (%) (Auto) 4  0-10  %


 


Basophils (%) (Auto) 1  0-10  %


 


Neutrophils # (Auto) 3.3  1.8-7.8  X 10^3


 


Lymphocytes # (Auto) 1.9  1.0-4.0  X 10^3


 


Monocytes # (Auto) 0.6  0.0-1.0  X 10^3


 


Eosinophils # (Auto)


 0.2 


 0.0-0.3


10^3/uL


 


Basophils # (Auto)


 0.0 


 0.0-0.1


10^3/uL


 


Sodium Level 141  135-145  MMOL/L


 


Potassium Level 3.3 L 3.6-5.0  MMOL/L


 


Chloride Level 107    MMOL/L


 


Carbon Dioxide Level 25  21-32  MMOL/L


 


Anion Gap 9  5-14  MMOL/L


 


Blood Urea Nitrogen 8  7-18  MG/DL


 


Creatinine


 0.73 


 0.60-1.30


MG/DL


 


Estimat Glomerular Filtration


Rate > 60 


  





 


BUN/Creatinine Ratio 11   


 


Glucose Level 124 H   MG/DL


 


Calcium Level 9.5  8.5-10.1  MG/DL


 


Total Bilirubin 0.7  0.1-1.0  MG/DL


 


Aspartate Amino Transf


(AST/SGOT) 12 


 5-34  U/L





 


Alanine Aminotransferase


(ALT/SGPT) 9 


 0-55  U/L





 


Alkaline Phosphatase 50    U/L


 


Total Protein 7.0  6.4-8.2  GM/DL


 


Albumin 4.8 H 3.2-4.5  GM/DL


 


Human Chorionic Gonadotropin,


Quant 3410 H


 <5  MIU/ML





 


Serum Pregnancy Test,


Qualitative POSITIVE 


 NEGATIVE  











My Orders





Orders - ANGELA SHEARER


Cbc With Automated Diff (18 16:49)


Comprehensive Metabolic Panel (18 16:49)


Hcg,Qualitative Serum (18 16:57)


Hcg,Quantitative (18 16:57)


Acetaminophen Tablet/Caplet (Tylenol  T (18 17:55)





Vital Signs/I&O











 18





 16:50


 


Temp 99.9


 


Pulse 127


 


Resp 20


 


B/P (MAP) 140/85 (103)


 


Pulse Ox 98


 


O2 Delivery Room Air





Capillary Refill : Less Than 3 Seconds








Blood Pressure Mean:  103








Progress Note :  


   Time:  16:50


Progress Note


Patient presents with symptoms compatible with threatened pregnancy of 5 weeks. 

Labs to be completed. Emotional support offered. Observed underwear with 

moderate bright read blood and small clot. 


1730 quantitative hCG on 7/3/18 with 1,902, today it is 3,410. Serum hCG 

positive. These findings were discussed with the patient, while they do show an 

increase and are still positive I feel it is most likely because she just began 

having vaginal bleeding. Patient requesting vaginal ultrasound, explained this 

is not clinically indicated at this time. Patient continuing to have scant 

amount of vaginal bleeding with small clots. 


1800 risk of threatened pregnancy discussed at length with the patient and her 

family. Follow-up care at this time will be supportive in nature, she is to 

schedule an appointment with Dr. Bianchi for early next week. Discharge 

instructions and return precautions reviewed with her. All questions answered.





Departure


Impression





 Primary Impression:  


 Threatened  in early pregnancy


Disposition:   HOME, SELF-CARE


Condition:  Stable





Departure-Patient Inst.


Decision time for Depature:  18:00


Referrals:  


THEA COOK DO (PCP)


Primary Care Physician








[facility] (Family)


Primary Care Physician


Patient Instructions:  Threatened Miscarriage (DC)





Add. Discharge Instructions:  


Bed rest, increase water intake, vaginal rest (No tampons, intercourse or 

anything in vagina). 


Follow-up with Dr. Cook on Monday.


Continue to wear pads and monitored her vaginal bleeding.


Continue taking your prenatal vitamin.


You may take Tylenol 650 mg alternating with ibuprofen 600 mg every 4 hours for 

cramping or fever.


Return to emergency department for severe weakness, severe abdominal pain, 

persistent nausea and vomiting, or temperature greater than 101.





All discharge instructions reviewed with patient and/or family. Voiced 

understanding.





Copy


Copies To 1:   THEA COOK AMY ARNP 2018 17:27

## 2018-08-14 ENCOUNTER — HOSPITAL ENCOUNTER (EMERGENCY)
Dept: HOSPITAL 75 - ER | Age: 23
Discharge: HOME | End: 2018-08-14
Payer: MEDICAID

## 2018-08-14 VITALS — DIASTOLIC BLOOD PRESSURE: 86 MMHG | SYSTOLIC BLOOD PRESSURE: 139 MMHG

## 2018-08-14 VITALS — WEIGHT: 104 LBS | HEIGHT: 62 IN | BODY MASS INDEX: 19.14 KG/M2

## 2018-08-14 DIAGNOSIS — Z3A.11: ICD-10-CM

## 2018-08-14 DIAGNOSIS — Z98.890: ICD-10-CM

## 2018-08-14 DIAGNOSIS — Z90.89: ICD-10-CM

## 2018-08-14 DIAGNOSIS — O03.9: Primary | ICD-10-CM

## 2018-08-14 LAB
ALBUMIN SERPL-MCNC: 4.7 GM/DL (ref 3.2–4.5)
ALP SERPL-CCNC: 51 U/L (ref 40–136)
ALT SERPL-CCNC: 14 U/L (ref 0–55)
BILIRUB SERPL-MCNC: 0.7 MG/DL (ref 0.1–1)
BUN/CREAT SERPL: 11
CALCIUM SERPL-MCNC: 10.2 MG/DL (ref 8.5–10.1)
CHLORIDE SERPL-SCNC: 103 MMOL/L (ref 98–107)
CO2 SERPL-SCNC: 29 MMOL/L (ref 21–32)
CREAT SERPL-MCNC: 0.61 MG/DL (ref 0.6–1.3)
ERYTHROCYTE [DISTWIDTH] IN BLOOD BY AUTOMATED COUNT: 12.3 % (ref 10–14.5)
GFR SERPLBLD BASED ON 1.73 SQ M-ARVRAT: > 60 ML/MIN
GLUCOSE SERPL-MCNC: 137 MG/DL (ref 70–105)
HCT VFR BLD CALC: 41 % (ref 35–52)
HGB BLD-MCNC: 14.6 G/DL (ref 11.5–16)
MCH RBC QN AUTO: 32 PG (ref 25–34)
MCHC RBC AUTO-ENTMCNC: 36 G/DL (ref 32–36)
MCV RBC AUTO: 88 FL (ref 80–99)
PLATELET # BLD: 256 10^3/UL (ref 130–400)
PMV BLD AUTO: 10.7 FL (ref 7.4–10.4)
POTASSIUM SERPL-SCNC: 3.5 MMOL/L (ref 3.6–5)
PROT SERPL-MCNC: 7.2 GM/DL (ref 6.4–8.2)
RBC # BLD AUTO: 4.64 10^6/UL (ref 4.35–5.85)
SODIUM SERPL-SCNC: 139 MMOL/L (ref 135–145)
WBC # BLD AUTO: 6.6 10^3/UL (ref 4.3–11)

## 2018-08-14 PROCEDURE — 76801 OB US < 14 WKS SINGLE FETUS: CPT

## 2018-08-14 PROCEDURE — 36415 COLL VENOUS BLD VENIPUNCTURE: CPT

## 2018-08-14 PROCEDURE — 80053 COMPREHEN METABOLIC PANEL: CPT

## 2018-08-14 PROCEDURE — 85027 COMPLETE CBC AUTOMATED: CPT

## 2018-08-14 PROCEDURE — 84702 CHORIONIC GONADOTROPIN TEST: CPT

## 2018-08-14 NOTE — DIAGNOSTIC IMAGING REPORT
PROCEDURE: US OB SINGLE FETUS <14 WKS.



TECHNIQUE: Multiple real-time grayscale images were obtained over

the gravid uterus in various projections. 



INDICATION: Vaginal bleeding.



FINDINGS: There is an intrauterine gestational sac containing a

fetal pole. The crown-rump length measurement is 16 mm,

consistent with 8 weeks 1 day gestation. No fetal heart motion is

identified, consistent with fetal demise. No perigestational sac

hemorrhage is detected. Gestational sac shape is within normal

limits. Adnexa were evaluated. Ovaries were not visualized due to

overlying bowel gas. No adnexal mass or free fluid is seen.



IMPRESSION: Findings consistent with 8 weeks 1 day intrauterine

fetal demise.



Dictated by: 



  Dictated on workstation # [id number]

## 2018-08-14 NOTE — XMS REPORT
[facility]

 Created on: 2018



Costa Jeannie

External Reference #: [id number]

: 1995

Sex: Female



Demographics







 Address  [address]  [address]

 

 Preferred Language  Unknown

 

 Marital Status  Unknown

 

 Protestant Affiliation  Unknown

 

 Race  Unknown

 

 Ethnic Group  Unknown





Author







 Author  PIPE DONATO

 

 Organization  [facility]

 

 Address  [address] [place]  [address]



 

 Phone  [phone]







Care Team Providers







 Care Team Member Name  Role  Phone

 

 PIPE DONATO  Unavailable  [phone]







PROBLEMS







 Type  Condition  ICD9-CM Code  TSC10-NN Code  Onset Dates  Condition Status  
SNOMED Code

 

 Problem  Chronic GERD     K21.9     Active  023708782







ALLERGIES

No Information



ENCOUNTERS







 Encounter  Location  Date  Diagnosis

 

 [facility]  [address] [id number]  01 Mar, 
2018   

 

 [facility]  [id number] [place] [phone]     

 

 [facility]  [id number] [place] [id number]    Encounter for BCP (birth control pills) initial prescription Z30.011 ; 
Chronic GERD K21.9 ; Acute bilateral low back pain without sciatica M54.5 ; 
Underweight R63.6 and Muscle spasm of back M62.830

 

 [facility]  [address] [place] [phone]  21 Sep, 2017  Exposure to hepatitis C Z20.5

 

 [facility]  [address] [place] [phone]  18 Sep, 2017   







IMMUNIZATIONS

No Known Immunizations



SOCIAL HISTORY

Never Assessed



REASON FOR VISIT

Hep C



PLAN OF CARE





VITAL SIGNS





MEDICATIONS

Unknown Medications



RESULTS

No Results



PROCEDURES

No Known procedures



INSTRUCTIONS





MEDICATIONS ADMINISTERED

No Known Medications



MEDICAL (GENERAL) HISTORY







 Type  Description  Date

 

 Surgical History  tonsillectomy and adenoidectomy   

 

 Surgical History   section  2017

## 2018-08-14 NOTE — XMS REPORT
[facility]

 Created on: 2018



Jeannie Skelton

External Reference #: [id number]

: 1995

Sex: Female



Demographics







 Address  [address]  [address]

 

 Preferred Language  Unknown

 

 Marital Status  Unknown

 

 Jewish Affiliation  Unknown

 

 Race  Unknown

 

 Ethnic Group  Unknown





Author







 Author  PIPE DONATO

 

 Organization  [facility]

 

 Address  [address] [place]  [address]



 

 Phone  [phone]







Care Team Providers







 Care Team Member Name  Role  Phone

 

 PIPE DONATO  Unavailable  [phone]







PROBLEMS







 Type  Condition  ICD9-CM Code  CHC62-HE Code  Onset Dates  Condition Status  
SNOMED Code

 

 Problem  Chronic GERD     K21.9     Active  186316794







ALLERGIES

No Known Allergies



ENCOUNTERS







 Encounter  Location  Date  Diagnosis

 

 [facility]  [address] [id number]  01 Mar, 
2018   

 

 [facility]  [id number] [place] [phone]
[phone]     

 

 [facility]  [id number] [place] [id number]    Encounter for BCP (birth control pills) initial prescription Z30.011 ; 
Chronic GERD K21.9 ; Acute bilateral low back pain without sciatica M54.5 ; 
Underweight R63.6 and Muscle spasm of back M62.830

 

 [facility]  [address] [place] [phone]
[phone]  21 Sep, 2017  Exposure to hepatitis C Z20.5

 

 [facility]  [id number] [place] [phone]
[phone]  18 Sep, 2017   







IMMUNIZATIONS

No Known Immunizations



SOCIAL HISTORY

Never Assessed



REASON FOR VISIT

HEP C TESTING ---[id number]



PLAN OF CARE







 Activity  Details









  









 Follow Up  we will call w results Reason:







VITAL SIGNS







 Height  63 in  2017

 

 Weight  99 lbs  2017

 

 Temperature  98.5 degrees Fahrenheit  2017

 

 Heart Rate  110 bpm  2017

 

 Respiratory Rate  20   2017

 

 BMI  17.54 kg/m2  2017

 

 Blood pressure systolic  120 mmHg  2017

 

 Blood pressure diastolic  82 mmHg  2017







MEDICATIONS







 Medication  Instructions  Dosage  Frequency  Start Date  End Date  Duration  
Status

 

 Sprintec 28 0.25-35 MG-MCG  Orally Once a day  1 tablet  24h           Active







RESULTS







 Name  Result  Date  Reference Range

 

 HEP C AB W/REFLEX (ALLIANCE ONLY)     2017   







PROCEDURES







 Procedure  Date Ordered  Result  Body Site

 

 HEP C AB W/REFLEX (ALLIANCE ONLY)  2017      

 

 DRUG TEST PRSMV DIR OPT OBS  2017      

 

 VENIPUNCT, ROUTINE*  2017      







INSTRUCTIONS





MEDICATIONS ADMINISTERED

No Known Medications



MEDICAL (GENERAL) HISTORY







 Type  Description  Date

 

 Surgical History  tonsillectomy and adenoidectomy   

 

 Surgical History   section  2017

## 2018-08-14 NOTE — ED GU-FEMALE
General


Chief Complaint:  -Female


Stated Complaint:  PREG COMPLICATIONS/11 WKS


Nursing Triage Note:  


ARRIVED VIA AMB WITHOUT DIFFICULTY TO ROOM 09. COMPLAINS OF PASSING BROWN 


DISCHARGE AND BLOOD EVER SINCE HAVING VAGINAL ULTRASOUND 2-3 WEEKS AGO. PT 


STATES SHE IS 11 WEEKS GESTATION.


Nursing Sepsis Screen:  No Definite Risk


Source:  patient


Exam Limitations:  no limitations





History of Present Illness


Date Seen by Provider:  Aug 14, 2018


Time Seen by Provider:  15:30


Initial Comments


Patient is a 22-year-old female who presents to the emergency room with 

complaints of passing brown discharge and blood ever since her transvaginal 

ultrasound 2-3 weeks ago. She reports that she is 11 weeks gestation. Denies 

any abdominal cramping or pain. Patient of Dr. Nuñez.


Timing/Duration:  other (2-30weeks ago)


Associated Symptoms:  denies symptoms





Allergies and Home Medications


Allergies


Coded Allergies:  


     kiwi (Unverified  Allergy, Unknown, 14)





Home Medications


Pnv95/Ferrous Fumarate/FA 1 Each Tablet, 1 EACH PO DAILY, (Reported)





Patient Home Medication List


Home Medication List Reviewed:  Yes





Review of Systems


Constitutional:  see HPI; No chills, No fever


Genitourinary:  see HPI, discharge (brown vaginal discharge.)


Pregnant:  Yes


Expected Date of Delivery:  2019


LMP:  2018





Past Medical-Social-Family Hx


Patient Social History


Alcohol Use:  Denies Use


Recreational Drug Use:  No


Smoking Status:  Never a Smoker


Recent Foreign Travel:  No


Contact w/Someone Who Travel:  No


Recent Infectious Disease Expo:  No


Recent Hopitalizations:  No





Immunizations Up To Date


PED Vaccines UTD:  No





Seasonal Allergies


Seasonal Allergies:  Yes





Past Medical History


Surgeries:  Yes (wisdom teeth)


Adenoidectomy,  Section, Tonsillectomy


Respiratory:  No


Cardiac:  No


Neurological:  No


Pregnant:  Yes


Last Menstrual Period:  Mar 11, 2019


Reproductive Disorders:  No


Female Reproductive Disorders:  Denies


Sexually Transmitted Disease:  No


HIV/AIDS:  No


Genitourinary:  No


Gastrointestinal:  No


Musculoskeletal:  No


Endocrine:  No


HEENT:  No


Cancer:  No


Psychosocial:  No


Integumentary:  No


Blood Disorders:  No


Adverse Reaction/Blood Tranf:  No





Family Medical History





Not obtainable due to adoption





Physical Exam


Vital Signs





Vital Signs - First Documented








 18





 15:27


 


Temp 98.0


 


Pulse 105


 


Resp 16


 


B/P (MAP) 139/106 (117)


 


Pulse Ox 98


 


O2 Delivery Room Air





Capillary Refill : Less Than 3 Seconds


Height, Weight, BMI


Height: 5'2.00"


Weight: 104lbs. 0.0oz. 47.917124kb; 26.9 BMI


Method:Stated


General Appearance:  WD/WN, no apparent distress


Cardiovascular:  regular rate, rhythm, no edema, no gallop, no JVD, no murmur


Respiratory:  chest non-tender, lungs clear, normal breath sounds, no 

respiratory distress, no accessory muscle use


Gastrointestinal:  normal bowel sounds, non tender, soft, no organomegaly, no 

pulsatile mass


Neurologic/Psychiatric:  alert, normal mood/affect, oriented x 3


Skin:  normal color, warm/dry





Progress/Results/Core Measures


Suspected Sepsis


Recent Fever Within 48 Hours:  No


Infection Criteria Present:  None


New/Unexplained  Altered Menta:  No


Sepsis Screen:  No Definite Risk


SIRS


Temperature:98.0 


Pulse: 105 


Respiratory Rate: 16


 


Laboratory Tests


18 15:42: White Blood Count 6.6


Blood Pressure 139 /106 


Mean: 117


 


Laboratory Tests


18 15:42: 


Creatinine 0.61, Platelet Count 256, Total Bilirubin 0.7








Results/Orders


Lab Results





Laboratory Tests








Test


 18


15:42 Range/Units


 


 


White Blood Count


 6.6 


 4.3-11.0


10^3/uL


 


Red Blood Count


 4.64 


 4.35-5.85


10^6/uL


 


Hemoglobin 14.6  11.5-16.0  G/DL


 


Hematocrit 41  35-52  %


 


Mean Corpuscular Volume 88  80-99  FL


 


Mean Corpuscular Hemoglobin 32  25-34  PG


 


Mean Corpuscular Hemoglobin


Concent 36 


 32-36  G/DL





 


Red Cell Distribution Width 12.3  10.0-14.5  %


 


Platelet Count


 256 


 130-400


10^3/uL


 


Mean Platelet Volume 10.7 H 7.4-10.4  FL


 


Sodium Level 139  135-145  MMOL/L


 


Potassium Level 3.5 L 3.6-5.0  MMOL/L


 


Chloride Level 103    MMOL/L


 


Carbon Dioxide Level 29  21-32  MMOL/L


 


Anion Gap 7  5-14  MMOL/L


 


Blood Urea Nitrogen 7  7-18  MG/DL


 


Creatinine


 0.61 


 0.60-1.30


MG/DL


 


Estimat Glomerular Filtration


Rate > 60 


  





 


BUN/Creatinine Ratio 11   


 


Glucose Level 137 H   MG/DL


 


Calcium Level 10.2 H 8.5-10.1  MG/DL


 


Corrected Calcium   8.5-10.1  MG/DL


 


Total Bilirubin 0.7  0.1-1.0  MG/DL


 


Aspartate Amino Transf


(AST/SGOT) 15 


 5-34  U/L





 


Alanine Aminotransferase


(ALT/SGPT) 14 


 0-55  U/L





 


Alkaline Phosphatase 51    U/L


 


Total Protein 7.2  6.4-8.2  GM/DL


 


Albumin 4.7 H 3.2-4.5  GM/DL


 


Human Chorionic Gonadotropin,


Quant 8171 H


 <5  MIU/ML











My Orders





Orders - DOROTHY ESTRELLA


Hcg,Quantitative (18 15:32)


Us Ob Single Fetus<14 [id number] (18 15:32)


Cbc No Diff (18 15:32)


Comprehensive Metabolic Panel (18 15:32)





Vital Signs/I&O


Capillary Refill : Less Than 3 Seconds








Blood Pressure Mean:  117








Progress Note :  


   Time:  16:30


Progress Note


I spoke with Dr. Garcia regarding the patient's ultrasound report. I also 

informed her that the patient does have an appointment on  with 

Dr. Nuñez. She recommends her keeping this appointment to discuss the 

options of D&C versus attempting to let the fetus pass on its own. The patient 

agrees with plans of care, plans for discharge, return precautions were given.





Departure


Impression





 Primary Impression:  


 Miscarriage


Disposition:  01 HOME, SELF-CARE


Condition:  Stable/Unchanged





Departure-Patient Inst.


Decision time for Depature:  16:19


Referrals:  


THEA COOK DO (PCP)


Primary Care Physician








[facility] (Family)


Primary Care Physician


Patient Instructions:  Miscarriage (DC)





Add. Discharge Instructions:  


Keep your appointment with Dr. Cook on Thursday of this week to discuss 

your options. Return back to the emergency room should you develop any pain, 

nausea, vomiting, heavy vaginal bleeding, worsening symptoms, or any other 

concerns as needed. All discharge instructions reviewed with patient and/or 

family. Voiced understanding.











DOROTHY ESTRELLA Aug 14, 2018 16:19

## 2018-08-14 NOTE — XMS REPORT
[facility]

 Created on: 2018



Costa Jeannie

External Reference #: [id number]

: 1995

Sex: Female



Demographics







 Address  [address]  [address]

 

 Preferred Language  Unknown

 

 Marital Status  Unknown

 

 Sabianism Affiliation  Unknown

 

 Race  Unknown

 

 Ethnic Group  Unknown





Author







 Author  THEA COOK

 

 Organization  [facility]

 

 Address  [address] [place]  [address]



 

 Phone  [phone]







Care Team Providers







 Care Team Member Name  Role  Phone

 

 ALAYNAPOOJACHASTITY  THEA  Unavailable  [phone]







PROBLEMS







 Type  Condition  ICD9-CM Code  CWO99-GX Code  Onset Dates  Condition Status  
SNOMED Code

 

 Problem  Chronic GERD     K21.9     Active  [id number]







ALLERGIES

No Information



ENCOUNTERS







 Encounter  Location  Date  Diagnosis

 

 [facility]  [address] [place] [id number]  01 Mar, 
2018   

 

 [facility]  [id number] [place] [phone]
[phone]     

 

 [facility]  [id number] [place] [phone]    Encounter for BCP (birth control pills) initial prescription Z30.011 ; 
Chronic GERD K21.9 ; Acute bilateral low back pain without sciatica M54.5 ; 
Underweight R63.6 and Muscle spasm of back M62.830

 

 [facility]  [address] [place] [phone]
[phone]  21 Sep, 2017  Exposure to hepatitis C Z20.5

 

 [facility]  [id number] [place] [phone]
[phone]  18 Sep, 2017   







IMMUNIZATIONS

No Known Immunizations



SOCIAL HISTORY

Never Assessed



REASON FOR VISIT

NEXIUM note



PLAN OF CARE





VITAL SIGNS





MEDICATIONS







 Medication  Instructions  Dosage  Frequency  Start Date  End Date  Duration  
Status

 

 Omeprazole 20 mg  Orally Once a day  1 capsule  24h          Active







RESULTS

No Results



PROCEDURES

No Known procedures



INSTRUCTIONS





MEDICATIONS ADMINISTERED

No Known Medications



MEDICAL (GENERAL) HISTORY







 Type  Description  Date

 

 Surgical History  tonsillectomy and adenoidectomy   

 

 Surgical History   section  2017

## 2018-08-14 NOTE — XMS REPORT
Continuity of Care Document

 Created on: 2018



ALEJANDRINA SCHAEFFER VIKKI

External Reference #: [id number]

: 1995

Sex: Female



Demographics







 Address  [address] [place] [address]  [address]

 

 Home Phone  [phone] x

 

 Preferred Language  Unknown

 

 Marital Status  Unknown

 

 Shinto Affiliation  Unknown

 

 Race  Unknown

 

 Ethnic Group  Unknown





Author







 Author  [facility]

 

 Organization  [facility]

 

 Address  Unknown

 

 Phone  Unavailable



              



Allergies

      





 Active            Description            Code            Type            
Severity            Reaction            Onset            Reported/Identified   
         Relationship to Patient            Clinical Status        

 

 Yes            kiwi            [id number]            Drug Allergy            
Unknown            N/A                         2014                      
            



                  



Medications

      



There is no data.                  



Problems

      





 Date Dx Coded            Attending            Type            Code            
Diagnosis            Diagnosed By        

 

 2010                         Ot            826.0                        
          

 

 2010                         Ot            959.7                        
          

 

 2010                         Ot            E000.8                       
           

 

 2010                         Ot            E849.0                       
           

 

 2010                         Ot            E917.9                       
           

 

 2011                         Ot            276.51                       
           

 

 2011                         Ot            493.00                       
           

 

 2011                         Ot            536.2                        
          

 

 2011                         Ot            564.1            IRRITABLE 
BOWEL SYNDROME                     

 

 2011                         Ot            789.01            ABDOMINAL 
PAIN, RIGHT UPPER QUADRANT                     

 

 2014            LALA MCCOY            Ot            599.0    
        URIN TRACT INFECTION NOS                     

 

 2014            LALA MCCOY            Ot            620.2    
        OVARIAN CYST NEC/NOS                     

 

 2014            LALA MCCOY            Ot            623.8    
        NONINFLAM DIS VAGINA NEC                     

 

 2014            LALA MCCOY            Ot            789.09   
         ABDOMINAL PAIN, OTHER SPECIFIED SITE                     

 

 2014            LALA MCCOY            Ot            V69.2    
        HIGH-RISK SEXUAL BEHAVIOR                     

 

 2015                         Ot            611.72                       
           

 

 2015                         Ot            611.72                       
           

 

 2015            RACH LARA APRN            Ot            599.0      
      URIN TRACT INFECTION NOS                     

 

 2015            RACH LARA APRVALERIE            Ot            681.00     
       CELLULITIS, FINGER NOS                     

 

 2015            RACH LARA APRVALERIE            Ot            883.0      
      OPEN WOUND OF FINGER                     

 

 2015            RACH LARA APRVALREIE            Ot            883.1      
      OPEN WOUND FINGER-COMPL                     

 

 2015            RACH LARA APRN            Ot            E000.8     
       OTHER EXTERNAL CAUSE STATUS                     

 

 2015            LARA, PETER J APRN            Ot            E849.0     
       ACCIDENT IN HOME                     

 

 2015            RACH LARA            Ot            E920.8     
       ACC-CUTTING INSTRUM NEC                     

 

 2017                         Ot            611.72            LUMP OR 
MASS IN BREAST                     

 

 2017            VAL TRAMMELL, NORMAN RANGEL Ot            
O32.1XX0            MATERNAL CARE FOR BREECH PRESENTATION, U                   
  

 

 2017            NORMAN NEAL MD, Ot            
O41.03X0            OLIGOHYDRAMNIOS, THIRD TRIMESTER, NOT AP                   
  

 

 2017            NORMAN NEAL MD, Ot            
O99.824            STREPTOCOCCUS B CARRIER STATE COMPLICATI                     

 

 2017            NORMAN NEAL MD            Ot            Z23  
          ENCOUNTER FOR IMMUNIZATION                     

 

 2017            NORMAN NEAL MD, Ot            Z37.0
            SINGLE LIVE BIRTH                     

 

 2017            NORMAN NEAL MD, Ot            
Z3A.38            38 WEEKS GESTATION OF PREGNANCY                     

 

 2018            RACH LARA            Ot            N83.291    
        OTHER OVARIAN CYST, RIGHT SIDE                     

 

 2018            RACH LARA            Ot            O20.0      
      THREATENED                      

 

 2018            RACH LARA            Ot            O34.80     
       MATERNAL CARE FOR OTH ABNLT OF PELVIC OR                     

 

 2018            RACH LARA            Ot            O99.89     
       OTH DISEASES AND CONDITIONS COMPL PREG/C                     

 

 2018            RACH LARA            Ot            Z3A.00     
       WEEKS OF GESTATION OF PREGNANCY NOT SPEC                     

 

 2018            RACH LARA            Ot            N83.291    
        OTHER OVARIAN CYST, RIGHT SIDE                     

 

 2018            RACH LARA            Ot            O20.0      
      THREATENED                      

 

 2018            RACH LARA            Ot            O34.80     
       MATERNAL CARE FOR OTH ABNLT OF PELVIC OR                     

 

 2018            RACH LARA            Ot            O99.89     
       OTH DISEASES AND CONDITIONS COMPL PREG/C                     

 

 2018            RACH LARA            Ot            Z3A.00     
       WEEKS OF GESTATION OF PREGNANCY NOT SPEC                     

 

 2018            ANGELA SHEARERP            Ot            O20.0            
THREATENED                      

 

 2018            ANGELA SHEARERP            Ot            O20.9            
HEMORRHAGE IN EARLY PREGNANCY, UNSPECIFI                     

 

 2018            MANFRED, ANGELA ARNP            Ot            Z3A.01          
  LESS THAN 8 WEEKS GESTATION OF PREGNANCY                     

 

 2018            ANGELA SHEARER            Ot            Z87.59          
  PERSONAL HISTORY OF COMP OF PREG, CHLDBR                     

 

 2018            ANGELA SHEARER            Ot            Z90.89          
  ACQUIRED ABSENCE OF OTHER ORGANS                     

 

 2018            TC ALEMAN DO            Ot            R10.2     
       PELVIC AND PERINEAL PAIN                     

 

 2018            ZHANGECH TC SCHROEDER            Ot            Z3A.01    
        LESS THAN 8 WEEKS GESTATION OF PREGNANCY                     



                                                                               
                                 



Procedures

      





 Code            Description            Performed By            Performed On   
     

 

             [id number]                                  EXTRACTION OF POC, LOW 
CERVICAL, OPEN AP                                   2017        



                  



Results

      





 Test            Result            Range        









 Complete blood count (CBC) with automated white blood cell (WBC) differential 
- 17 09:35         









 Blood leukocytes automated count (number/volume)            10.3 10*3/uL      
      4.3-11.0        

 

 Blood erythrocytes automated count (number/volume)            4.18 10*6/uL    
        4.35-5.85        

 

 Venous blood hemoglobin measurement (mass/volume)            12.5 g/dL        
    11.5-16.0        

 

 Blood hematocrit (volume fraction)            36 %            35-52        

 

 Automated erythrocyte mean corpuscular volume            86 [foz_us]          
  80-99        

 

 Automated erythrocyte mean corpuscular hemoglobin (mass per erythrocyte)      
      30 pg            25-34        

 

 Automated erythrocyte mean corpuscular hemoglobin concentration measurement (
mass/volume)            35 g/dL            32-36        

 

 Automated erythrocyte distribution width ratio            12.8 %            
10.0-14.5        

 

 Automated blood platelet count (count/volume)            179 10*3/uL          
  130-400        

 

 Automated blood platelet mean volume measurement            11.1 [foz_us]     
       7.4-10.4        

 

 Automated blood neutrophils/100 leukocytes            78 %            42-75   
     

 

 Automated blood lymphocytes/100 leukocytes            15 %            12-44   
     

 

 Blood monocytes/100 leukocytes            7 %            0-12        

 

 Automated blood eosinophils/100 leukocytes            1 %            0-10     
   

 

 Automated blood basophils/100 leukocytes            0 %            0-10        

 

 Blood neutrophils automated count (number/volume)            8.0 10*3         
   1.8-7.8        

 

 Blood lymphocytes automated count (number/volume)            1.5 10*3         
   1.0-4.0        

 

 Blood monocytes automated count (number/volume)            0.7 10*3            
0.0-1.0        

 

 Automated eosinophil count            0.1 10*3/uL            0.0-0.3        

 

 Automated blood basophil count (count/volume)            0.0 10*3/uL          
  0.0-0.1        









 Blood type T Indirect antibody screen panel - 17 09:35         









 ABO+Rh group            BP             NRG        

 

 Transfusion band number            [id number]             NRG        

 

 Blood group antibody screen            NEGATIVE             NRG        









 Complete blood count (CBC) with automated white blood cell (WBC) differential 
- 18 10:49         









 Blood leukocytes automated count (number/volume)            6.3 10*3/uL       
     4.3-11.0        

 

 Blood erythrocytes automated count (number/volume)            4.96 10*6/uL    
        4.35-5.85        

 

 Venous blood hemoglobin measurement (mass/volume)            15.2 g/dL        
    11.5-16.0        

 

 Blood hematocrit (volume fraction)            43 %            35-52        

 

 Automated erythrocyte mean corpuscular volume            87 [foz_us]          
  80-99        

 

 Automated erythrocyte mean corpuscular hemoglobin (mass per erythrocyte)      
      31 pg            25-34        

 

 Automated erythrocyte mean corpuscular hemoglobin concentration measurement (
mass/volume)            35 g/dL            32-36        

 

 Automated erythrocyte distribution width ratio            12.5 %            
10.0-14.5        

 

 Automated blood platelet count (count/volume)            271 10*3/uL          
  130-400        

 

 Automated blood platelet mean volume measurement            10.8 [foz_us]     
       7.4-10.4        

 

 Automated blood neutrophils/100 leukocytes            67 %            42-75   
     

 

 Automated blood lymphocytes/100 leukocytes            22 %            12-44   
     

 

 Blood monocytes/100 leukocytes            9 %            0-12        

 

 Automated blood eosinophils/100 leukocytes            3 %            0-10     
   

 

 Automated blood basophils/100 leukocytes            0 %            0-10        

 

 Blood neutrophils automated count (number/volume)            4.3 10*3         
   1.8-7.8        

 

 Blood lymphocytes automated count (number/volume)            1.4 10*3         
   1.0-4.0        

 

 Blood monocytes automated count (number/volume)            0.5 10*3            
0.0-1.0        

 

 Automated eosinophil count            0.2 10*3/uL            0.0-0.3        

 

 Automated blood basophil count (count/volume)            0.0 10*3/uL          
  0.0-0.1        









 Comprehensive metabolic panel - 18 10:49         









 Serum or plasma sodium measurement (moles/volume)            140 mmol/L       
     135-145        

 

 Serum or plasma potassium measurement (moles/volume)            3.5 mmol/L    
        3.6-5.0        

 

 Serum or plasma chloride measurement (moles/volume)            106 mmol/L     
               

 

 Carbon dioxide            24 mmol/L            21-32        

 

 Serum or plasma anion gap determination (moles/volume)            10 mmol/L   
         5-14        

 

 Serum or plasma urea nitrogen measurement (mass/volume)            6 mg/dL    
        7-18        

 

 Serum or plasma creatinine measurement (mass/volume)            0.68 mg/dL    
        0.60-1.30        

 

 Serum or plasma urea nitrogen/creatinine mass ratio            9             
NRG        

 

 Serum or plasma creatinine measurement with calculation of estimated 
glomerular filtration rate            >             NRG        

 

 Serum or plasma glucose measurement (mass/volume)            94 mg/dL         
           

 

 Serum or plasma calcium measurement (mass/volume)            9.9 mg/dL        
    8.5-10.1        

 

 Serum or plasma total bilirubin measurement (mass/volume)            1.0 mg/dL
            0.1-1.0        

 

 Serum or plasma alkaline phosphatase measurement (enzymatic activity/volume)  
          51 U/L                    

 

 Serum or plasma aspartate aminotransferase measurement (enzymatic activity/
volume)            16 U/L            5-34        

 

 Serum or plasma alanine aminotransferase measurement (enzymatic activity/volume
)            12 U/L            0-55        

 

 Serum or plasma protein measurement (mass/volume)            7.5 g/dL         
   6.4-8.2        

 

 Serum or plasma albumin measurement (mass/volume)            5.0 g/dL         
   3.2-4.5        









 Serum or plasma choriogonadotropin measurement (units/volume) - 18 10:49
         









 Serum or plasma choriogonadotropin measurement (units/volume)            1076 m
[iU]/mL            <5        









 Complete urinalysis with reflex to culture - 18 10:55         









 Urine color determination            YELLOW             NRG        

 

 Urine clarity determination            CLEAR             NRG        

 

 Urine pH measurement by test strip            7             5-9        

 

 Specific gravity of urine by test strip            1.010             1.016-
1.022        

 

 Urine protein assay by test strip, semi-quantitative            NEGATIVE      
       NEGATIVE        

 

 Urine glucose detection by automated test strip            NEGATIVE           
  NEGATIVE        

 

 Erythrocytes detection in urine sediment by light microscopy            
NEGATIVE             NEGATIVE        

 

 Urine ketones detection by automated test strip            3+             
NEGATIVE        

 

 Urine nitrite detection by test strip            NEGATIVE             NEGATIVE
        

 

 Urine total bilirubin detection by test strip            NEGATIVE             
NEGATIVE        

 

 Urine urobilinogen measurement by automated test strip (mass/volume)          
  NORMAL             NORMAL        

 

 Urine leukocyte esterase detection by dipstick            1+             
NEGATIVE        

 

 Automated urine sediment erythrocyte count by microscopy (number/high power 
field)            NONE             NRG        

 

 Automated urine sediment leukocyte count by microscopy (number/high power field
)            RARE             NRG        

 

 Bacteria detection in urine sediment by light microscopy            NEGATIVE  
           NRG        

 

 Squamous epithelial cells detection in urine sediment by light microscopy     
       0-2             NRG        

 

 Crystals detection in urine sediment by light microscopy            NONE      
       NRG        

 

 Casts detection in urine sediment by light microscopy            NONE         
    NRG        

 

 Mucus detection in urine sediment by light microscopy            NEGATIVE     
        NRG        

 

 Complete urinalysis with reflex to culture            NO             NRG      
  









 Complete blood count (CBC) with automated white blood cell (WBC) differential 
- 18 17:05         









 Blood leukocytes automated count (number/volume)            6.1 10*3/uL       
     4.3-11.0        

 

 Blood erythrocytes automated count (number/volume)            4.53 10*6/uL    
        4.35-5.85        

 

 Venous blood hemoglobin measurement (mass/volume)            13.8 g/dL        
    11.5-16.0        

 

 Blood hematocrit (volume fraction)            39 %            35-52        

 

 Automated erythrocyte mean corpuscular volume            87 [foz_us]          
  80-99        

 

 Automated erythrocyte mean corpuscular hemoglobin (mass per erythrocyte)      
      31 pg            25-34        

 

 Automated erythrocyte mean corpuscular hemoglobin concentration measurement (
mass/volume)            35 g/dL            32-36        

 

 Automated erythrocyte distribution width ratio            12.5 %            
10.0-14.5        

 

 Automated blood platelet count (count/volume)            269 10*3/uL          
  130-400        

 

 Automated blood platelet mean volume measurement            10.8 [foz_us]     
       7.4-10.4        

 

 Automated blood neutrophils/100 leukocytes            54 %            42-75   
     

 

 Automated blood lymphocytes/100 leukocytes            32 %            12-44   
     

 

 Blood monocytes/100 leukocytes            11 %            0-12        

 

 Automated blood eosinophils/100 leukocytes            4 %            0-10     
   

 

 Automated blood basophils/100 leukocytes            1 %            0-10        

 

 Blood neutrophils automated count (number/volume)            3.3 10*3         
   1.8-7.8        

 

 Blood lymphocytes automated count (number/volume)            1.9 10*3         
   1.0-4.0        

 

 Blood monocytes automated count (number/volume)            0.6 10*3            
0.0-1.0        

 

 Automated eosinophil count            0.2 10*3/uL            0.0-0.3        

 

 Automated blood basophil count (count/volume)            0.0 10*3/uL          
  0.0-0.1        









 Serum or plasma choriogonadotropin (pregnancy test) detection - 18 17:05
         









 Serum or plasma choriogonadotropin (pregnancy test) detection            
POSITIVE             NEGATIVE        









 Comprehensive metabolic panel - 18 17:05         









 Serum or plasma sodium measurement (moles/volume)            141 mmol/L       
     135-145        

 

 Serum or plasma potassium measurement (moles/volume)            3.3 mmol/L    
        3.6-5.0        

 

 Serum or plasma chloride measurement (moles/volume)            107 mmol/L     
               

 

 Carbon dioxide            25 mmol/L            21-32        

 

 Serum or plasma anion gap determination (moles/volume)            9 mmol/L    
        5-14        

 

 Serum or plasma urea nitrogen measurement (mass/volume)            8 mg/dL    
        7-18        

 

 Serum or plasma creatinine measurement (mass/volume)            0.73 mg/dL    
        0.60-1.30        

 

 Serum or plasma urea nitrogen/creatinine mass ratio            11             
NRG        

 

 Serum or plasma creatinine measurement with calculation of estimated 
glomerular filtration rate            >             NRG        

 

 Serum or plasma glucose measurement (mass/volume)            124 mg/dL        
            

 

 Serum or plasma calcium measurement (mass/volume)            9.5 mg/dL        
    8.5-10.1        

 

 Serum or plasma total bilirubin measurement (mass/volume)            0.7 mg/dL
            0.1-1.0        

 

 Serum or plasma alkaline phosphatase measurement (enzymatic activity/volume)  
          50 U/L                    

 

 Serum or plasma aspartate aminotransferase measurement (enzymatic activity/
volume)            12 U/L            5-34        

 

 Serum or plasma alanine aminotransferase measurement (enzymatic activity/volume
)            9 U/L            0-55        

 

 Serum or plasma protein measurement (mass/volume)            7.0 g/dL         
   6.4-8.2        

 

 Serum or plasma albumin measurement (mass/volume)            4.8 g/dL         
   3.2-4.5        









 Serum or plasma choriogonadotropin measurement (units/volume) - 18 17:05
         









 Serum or plasma choriogonadotropin measurement (units/volume)            3410 m
[iU]/mL            <5        









 CULTURE, GENITAL - 18 09:55         









 CULTURE, GENITAL            SEE NOTE             NRG        









 GC/CHLAMYDIA (SWAB OR URINE)-RAPID - 18 09:55         









 CHLAMYDIA TRACHOMATIS RNA, TMA            NOT DETECTED             NOT 
DETECTED        

 

 NEISSERIA GONORRHOEAE RNA, TMA            NOT DETECTED             NOT 
DETECTED        

 

 COMMENT                         NRG        









 [employer] PAP RFX HPV mRNA E6/E7 - 18 09:55         









 CLINICAL INFORMATION:            Pregnant             NRG        

 

 LMP:            18             NRG        

 

 PREV. PAP:            WNL             NRG        

 

 PREV. BX:            NONE             NRG        

 

 SOURCE:            [employer]             NRG        

 

 STATEMENT OF ADEQUACY:                         NRG        

 

 INTERPRETATION/RESULT:                         NRG        

 

 CYTOTECHNOLOGIST:                         NRG        

 

 COMMENT                         NRG        



                                        



Encounters

      





 ACCT No.            Visit Date/Time            Discharge            Status    
        Pt. Type            Provider            Facility            Loc./Unit  
          Complaint        

 

 [id number]            2018 16:46:00            2018 18:45:00    
        DIS            Emergency            ANGELA SHEARER            [facility]            ER            5 WEEKS PREGNANT AND 
STARTED BLEEDING        

 

 [id number]            2018 06:18:00            2018 23:59:59    
        CLS            Outpatient            TC ALEMAN DO            
[facility]            LAB            LOW HCG LEVELS      
  

 

 [id number]            2018 10:23:00            2018 12:15:00    
        DIS            Emergency            RACH LARA            [facility]            ER            POSSIBLY PG,ABD CRAMPING 
       

 

 [id number]            2018 14:34:00            2018 23:59:59    
        CLS            Preadmit            CLIF COOK DOAREMARICHUY SOLORIO            
[facility]            REHAB            POSTPARTUM BACK 
PAIN        

 

 [id number]            2017 09:15:00            2017 13:35:00    
        DIS            Inpatient            NORMAN NEAL MD          
  [facility]            LDRP            BREECH 
PRESENTATION        

 

 [id number]            2015 18:14:00            2015 19:12:00    
        DIS            Emergency            RACH LARA            [facility]            ER            LEFT HAND FINGER LAC;ABD 
PAIN        

 

 [id number]            2014 11:04:00            2014 13:14:00    
        DIS            Emergency            LALA MCCOY            
[facility]            ER            ABD PAIN        

 

 [id number]            2011 13:31:00                                   
   Document Registration                                                       
     

 

 [id number]            2011 13:25:00                                   
   Document Registration                                                       
     

 

 [id number]            2011 11:22:00                                   
   Document Registration                                                       
     

 

 [id number]            2011 00:50:00                                   
   Document Registration                                                       
     

 

 [id number]            2010 22:18:00                                   
   Document Registration                                                       
     

 

 [id number]            2015 02:01:34                         [place]            
Document Registration                                                          
  

 

 [id number]            2018 08:00:00            2018 23:59:59          
  [facility]            Outpatient            RAVI LAC, ASUNCION                       
  [facility]                     

 

 [id number]            2018 08:00:00                                      
Document Registration

## 2018-08-16 ENCOUNTER — HOSPITAL ENCOUNTER (EMERGENCY)
Dept: HOSPITAL 75 - ER | Age: 23
LOS: 1 days | Discharge: HOME | End: 2018-08-17
Payer: MEDICAID

## 2018-08-16 VITALS — BODY MASS INDEX: 18.03 KG/M2 | WEIGHT: 98 LBS | HEIGHT: 62 IN

## 2018-08-16 DIAGNOSIS — Z90.89: ICD-10-CM

## 2018-08-16 DIAGNOSIS — Z87.59: ICD-10-CM

## 2018-08-16 DIAGNOSIS — O03.4: Primary | ICD-10-CM

## 2018-08-16 PROCEDURE — 85025 COMPLETE CBC W/AUTO DIFF WBC: CPT

## 2018-08-16 PROCEDURE — 36415 COLL VENOUS BLD VENIPUNCTURE: CPT

## 2018-08-16 PROCEDURE — 80053 COMPREHEN METABOLIC PANEL: CPT

## 2018-08-16 PROCEDURE — 99283 EMERGENCY DEPT VISIT LOW MDM: CPT

## 2018-08-16 PROCEDURE — 84702 CHORIONIC GONADOTROPIN TEST: CPT

## 2018-08-17 VITALS — DIASTOLIC BLOOD PRESSURE: 70 MMHG | SYSTOLIC BLOOD PRESSURE: 101 MMHG

## 2018-08-17 LAB
ALBUMIN SERPL-MCNC: 4.1 GM/DL (ref 3.2–4.5)
ALP SERPL-CCNC: 42 U/L (ref 40–136)
ALT SERPL-CCNC: 12 U/L (ref 0–55)
BASOPHILS # BLD AUTO: 0 10^3/UL (ref 0–0.1)
BASOPHILS NFR BLD AUTO: 0 % (ref 0–10)
BILIRUB SERPL-MCNC: 0.2 MG/DL (ref 0.1–1)
BUN/CREAT SERPL: 11
CALCIUM SERPL-MCNC: 8.8 MG/DL (ref 8.5–10.1)
CHLORIDE SERPL-SCNC: 106 MMOL/L (ref 98–107)
CO2 SERPL-SCNC: 23 MMOL/L (ref 21–32)
CREAT SERPL-MCNC: 0.55 MG/DL (ref 0.6–1.3)
EOSINOPHIL # BLD AUTO: 0.1 10^3/UL (ref 0–0.3)
EOSINOPHIL NFR BLD AUTO: 1 % (ref 0–10)
ERYTHROCYTE [DISTWIDTH] IN BLOOD BY AUTOMATED COUNT: 12.2 % (ref 10–14.5)
GFR SERPLBLD BASED ON 1.73 SQ M-ARVRAT: > 60 ML/MIN
GLUCOSE SERPL-MCNC: 111 MG/DL (ref 70–105)
HCT VFR BLD CALC: 35 % (ref 35–52)
HGB BLD-MCNC: 12.3 G/DL (ref 11.5–16)
LYMPHOCYTES # BLD AUTO: 1 X 10^3 (ref 1–4)
LYMPHOCYTES NFR BLD AUTO: 9 % (ref 12–44)
MANUAL DIFFERENTIAL PERFORMED BLD QL: NO
MCH RBC QN AUTO: 31 PG (ref 25–34)
MCHC RBC AUTO-ENTMCNC: 35 G/DL (ref 32–36)
MCV RBC AUTO: 89 FL (ref 80–99)
MONOCYTES # BLD AUTO: 0.5 X 10^3 (ref 0–1)
MONOCYTES NFR BLD AUTO: 5 % (ref 0–12)
NEUTROPHILS # BLD AUTO: 9.2 X 10^3 (ref 1.8–7.8)
NEUTROPHILS NFR BLD AUTO: 85 % (ref 42–75)
PLATELET # BLD: 227 10^3/UL (ref 130–400)
PMV BLD AUTO: 10.5 FL (ref 7.4–10.4)
POTASSIUM SERPL-SCNC: 3.6 MMOL/L (ref 3.6–5)
PROT SERPL-MCNC: 6.1 GM/DL (ref 6.4–8.2)
RBC # BLD AUTO: 3.94 10^6/UL (ref 4.35–5.85)
SODIUM SERPL-SCNC: 138 MMOL/L (ref 135–145)
WBC # BLD AUTO: 10.8 10^3/UL (ref 4.3–11)

## 2018-08-17 NOTE — ED GU-FEMALE
General


Chief Complaint:  -Female


Stated Complaint:  MISCARRIAGE


Nursing Triage Note:  


Pt reports being given medication to pass miscarriage today. Pt reports lower 


abd pain and states she has been unable to keep pain medications down due to 


nausea.


Nursing Sepsis Screen:  No Definite Risk


Source:  patient


Exam Limitations:  no limitations





History of Present Illness


Date Seen by Provider:  Aug 17, 2018


Time Seen by Provider:  23:10


Initial Comments


This 22-year-old woman presents to the emergency room with complications with 

incomplete miscarriage.  She had a fetal demise documented by ultrasound  at 8 weeks gestational age.  She was prescribed misoprostol by Dr. Cook 

and started this evening.  Since then she has had very sharp intense cramping 

pain in her pelvis.  She has felt lightheaded and dizzy.  She is also 

experienced nausea and vomiting despite using Zofran at home.  She has past 

some small clots but has not had any significant bleeding, heavy clots, or 

passage of tissue.  She reports her present bleeding is similar to a normal 

menstrual period.





Allergies and Home Medications


Allergies


Coded Allergies:  


     kiwi (Unverified  Allergy, Unknown, 14)





Home Medications


Pnv95/Ferrous Fumarate/FA 1 Each Tablet, 1 EACH PO DAILY, (Reported)





Patient Home Medication List


Home Medication List Reviewed:  Yes





Review of Systems


Constitutional:  no symptoms reported


EENTM:  no symptoms reported


Respiratory:  no symptoms reported


Cardiovascular:  see HPI


Gastrointestinal:  see HPI


Genitourinary:  see HPI


Pregnant:  Yes (presently miscarrying with fetal demise)


Musculoskeletal:  no symptoms reported


Skin:  no symptoms reported


Psychiatric/Neurological:  No Symptoms Reported


Endocrine:  No Symptoms Reported





Past Medical-Social-Family Hx


Past Med/Social Hx:  Reviewed Nursing Past Med/Soc Hx


Patient Social History


Alcohol Use:  Denies Use


Recreational Drug Use:  No


Smoking Status:  Never a Smoker


Recent Foreign Travel:  No


Contact w/Someone Who Travel:  No


Recent Infectious Disease Expo:  No


Recent Hopitalizations:  No





Immunizations Up To Date


PED Vaccines UTD:  No





Seasonal Allergies


Seasonal Allergies:  Yes





Past Medical History


Surgeries:  Yes (wisdom teeth)


Adenoidectomy,  Section, Tonsillectomy


Respiratory:  No


Cardiac:  No


Neurological:  No


Reproductive Disorders:  No


Female Reproductive Disorders:  Denies


Sexually Transmitted Disease:  No


HIV/AIDS:  No


Genitourinary:  No


Gastrointestinal:  No


Musculoskeletal:  No


Endocrine:  No


HEENT:  No


Cancer:  No


Psychosocial:  No


Integumentary:  No


Blood Disorders:  No


Adverse Reaction/Blood Tranf:  No





Family Medical History


Reviewed Nursing Family Hx





Not obtainable due to adoption





Physical Exam


Vital Signs





Vital Signs - First Documented








 18





 23:12


 


Temp 98.7


 


Pulse 79


 


Resp 18


 


B/P (MAP) 102/75 (84)


 


Pulse Ox 100


 


O2 Delivery Room Air





Capillary Refill : Less Than 3 Seconds


Height, Weight, BMI


Height: 5'2.00"


Weight: 98lbs. 0.0oz. 44.363551rr; 26.9 BMI


Method:Stated


General Appearance:  WD/WN, mild distress


HEENT:  PERRL/EOMI, normal ENT inspection


Neck:  normal inspection


Cardiovascular:  no edema, no murmur, tachycardia


Respiratory:  lungs clear, normal breath sounds, no respiratory distress, no 

accessory muscle use


Gastrointestinal:  normal bowel sounds, soft, tenderness (suprapubic)


Extremities:  normal inspection, no pedal edema


Neurologic/Psychiatric:  CNs II-XII nml as tested, no motor/sensory deficits, 

alert, normal mood/affect, oriented x 3


Skin:  normal color, warm/dry





Progress/Results/Core Measures


Suspected Sepsis


Recent Fever Within 48 Hours:  No


Infection Criteria Present:  None


New/Unexplained  Altered Menta:  No


Sepsis Screen:  No Definite Risk


SIRS


Temperature:98.7 


Pulse: 79 


Respiratory Rate: 18


 


Laboratory Tests


18 23:56: White Blood Count 10.8


Blood Pressure 102 /75 


Mean: 84


 


Laboratory Tests


18 23:56: 


Creatinine 0.55L, Platelet Count 227, Total Bilirubin 0.2








Results/Orders


Lab Results





Laboratory Tests








Test


 18


23:56 Range/Units


 


 


White Blood Count


 10.8 


 4.3-11.0


10^3/uL


 


Red Blood Count


 3.94 L


 4.35-5.85


10^6/uL


 


Hemoglobin 12.3  11.5-16.0  G/DL


 


Hematocrit 35  35-52  %


 


Mean Corpuscular Volume 89  80-99  FL


 


Mean Corpuscular Hemoglobin 31  25-34  PG


 


Mean Corpuscular Hemoglobin


Concent 35 


 32-36  G/DL





 


Red Cell Distribution Width 12.2  10.0-14.5  %


 


Platelet Count


 227 


 130-400


10^3/uL


 


Mean Platelet Volume 10.5 H 7.4-10.4  FL


 


Neutrophils (%) (Auto) 85 H 42-75  %


 


Lymphocytes (%) (Auto) 9 L 12-44  %


 


Monocytes (%) (Auto) 5  0-12  %


 


Eosinophils (%) (Auto) 1  0-10  %


 


Basophils (%) (Auto) 0  0-10  %


 


Neutrophils # (Auto) 9.2 H 1.8-7.8  X 10^3


 


Lymphocytes # (Auto) 1.0  1.0-4.0  X 10^3


 


Monocytes # (Auto) 0.5  0.0-1.0  X 10^3


 


Eosinophils # (Auto)


 0.1 


 0.0-0.3


10^3/uL


 


Basophils # (Auto)


 0.0 


 0.0-0.1


10^3/uL


 


Sodium Level 138  135-145  MMOL/L


 


Potassium Level 3.6  3.6-5.0  MMOL/L


 


Chloride Level 106    MMOL/L


 


Carbon Dioxide Level 23  21-32  MMOL/L


 


Anion Gap 9  5-14  MMOL/L


 


Blood Urea Nitrogen 6 L 7-18  MG/DL


 


Creatinine


 0.55 L


 0.60-1.30


MG/DL


 


Estimat Glomerular Filtration


Rate > 60 


  





 


BUN/Creatinine Ratio 11   


 


Glucose Level 111 H   MG/DL


 


Calcium Level 8.8  8.5-10.1  MG/DL


 


Corrected Calcium 8.7  8.5-10.1  MG/DL


 


Total Bilirubin 0.2  0.1-1.0  MG/DL


 


Aspartate Amino Transf


(AST/SGOT) 13 


 5-34  U/L





 


Alanine Aminotransferase


(ALT/SGPT) 12 


 0-55  U/L





 


Alkaline Phosphatase 42    U/L


 


Total Protein 6.1 L 6.4-8.2  GM/DL


 


Albumin 4.1  3.2-4.5  GM/DL


 


Human Chorionic Gonadotropin,


Quant 3747 H


 <5  MIU/ML











My Orders





Orders - MARIA GUADALUPE KARIMI MD


Ketorolac Injection (Toradol Injection) (18 23:15)


Ondansetron Injection (Zofran Injectio (18 23:15)


Fentanyl  Injection (Sublimaze Injection (18 23:15)


Cbc With Automated Diff (18 23:49)


Comprehensive Metabolic Panel (18 23:49)


Hcg,Quantitative (18 23:49)


Saline Lock/Iv-Start (18 23:49)


Ns Iv 1000 Ml (Sodium Chloride 0.9%) (18 23:49)


Morphine  Injection (Morphine  Injection (18 00:00)


Rx-Promethazine Hcl (Rx-Phenergan Supp) (18 00:42)





Medications Given in ED





Current Medications








 Medications  Dose


 Ordered  Sig/Arturo


 Route  Start Time


 Stop Time Status Last Admin


Dose Admin


 


 Fentanyl Citrate  50 mcg  ONCE  ONCE


 IVP  18 23:15


 18 23:16 DC 18 23:31


50 MCG


 


 Ketorolac


 Tromethamine  15 mg  ONCE  ONCE


 IVP  18 23:15


 18 23:16 DC 18 23:30


15 MG


 


 Morphine Sulfate  5 mg  ONCE  ONCE


 IVP  18 00:00


 18 00:01 DC 18 00:13


5 MG


 


 Ondansetron HCl  8 mg  ONCE  ONCE


 IVP  18 23:15


 18 23:16 DC 18 23:29


8 MG


 


 Sodium Chloride  1,000 ml @ 


 0 mls/hr  Q0M ONCE


 IV  18 23:49


 18 23:51 DC 18 23:57


1,000 MLS/HR








Vital Signs/I&O











 18





 23:12 01:16


 


Temp 98.7 98.7


 


Pulse 79 100


 


Resp 18 18


 


B/P (MAP) 102/75 (84) 101/70 (84)


 


Pulse Ox 100 100


 


O2 Delivery Room Air Room Air





Capillary Refill : Less Than 3 Seconds








Blood Pressure Mean:  84








Progress Note :  


Progress Note


Labs were assessed.  Patient received a liter of IV normal saline.  Pain was 

treated with Toradol and fentanyl.  She had some refractory pain which was 

treated with morphine.  Nausea was controlled with Zofran.  HCG level is now 

trending down.  Patient was dismissed with a take-home packet for Phenergan 

suppositories for nausea refractory to Zofran.





Departure


Impression





 Primary Impression:  


 Incomplete miscarriage


 Additional Impressions:  


 Abdominal cramping


 Nausea and vomiting


 Qualified Codes:  R11.2 - Nausea with vomiting, unspecified


Disposition:  01 HOME, SELF-CARE


Condition:  Improved





Departure-Patient Inst.


Decision time for Depature:  00:40


Referrals:  


THEA COOK DO (PCP)


Primary Care Physician








[facility] (Family)


Primary Care Physician


Patient Instructions:  Miscarriage





Add. Discharge Instructions:  


Use Zofran (ondansetron) as previously prescribed dissolved UNDER every 4 hours 

as needed for nausea and vomiting.  Use the Phenergan (promethazine) 

suppositories every 6 hours as needed for nausea not controlled by Zofran.


If you have not passed significant clots or tissue, repeat the misoprostol as 

directed.  Contact your obstetrical provider in the morning for further 

instructions.  If you do not feel comfortable taking the second dose of 

misoprostol now, discuss further with your doctor this morning.


You may take your hydrocodone up to 2 tablets every 4 hours as needed.  Return 

to care if you have worsening symptoms or if you develop new symptoms such as 

fever over 100.


Drink plenty of clear liquids to stay well-hydrated.











All discharge instructions reviewed with patient and/or family. Voiced 

understanding.





Copy


Copies To 1:   THEA COOK JOSHUA T MD Aug 17, 2018 00:59

## 2018-12-08 ENCOUNTER — HOSPITAL ENCOUNTER (EMERGENCY)
Dept: HOSPITAL 75 - ER | Age: 23
Discharge: HOME | End: 2018-12-08
Payer: MEDICAID

## 2018-12-08 VITALS — BODY MASS INDEX: 17.48 KG/M2 | WEIGHT: 95 LBS | HEIGHT: 62 IN

## 2018-12-08 VITALS — SYSTOLIC BLOOD PRESSURE: 129 MMHG | DIASTOLIC BLOOD PRESSURE: 82 MMHG

## 2018-12-08 DIAGNOSIS — Z90.89: ICD-10-CM
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
## 2018-12-08 DIAGNOSIS — Z98.890: ICD-10-CM

## 2018-12-08 DIAGNOSIS — S81.811A: Primary | ICD-10-CM

## 2018-12-08 NOTE — XMS REPORT
[facility]

 Created on: 2018



Jeannie Skelton

External Reference #: [id number]

: 1995

Sex: Female



Demographics







 Address  [address] [place] [address]  [address]

 

 Preferred Language  Unknown

 

 Marital Status  Unknown

 

 Rastafarian Affiliation  Unknown

 

 Race  Unknown

 

 Ethnic Group  Unknown





Author







 Author  THEA COOK  [facility]

 

 Address  [address] [place]  [address]



 

 Phone  [phone]







Care Team Providers







 Care Team Member Name  Role  Phone

 

 THEA COOK  Unavailable  [phone]







PROBLEMS







 Type  Condition  ICD9-CM Code  IFU78-AU Code  Onset Dates  Condition Status  
SNOMED Code

 

 Problem  Drug use complicating pregnancy, first trimester     O99.321     
Active  66594846

 

 Problem  Encounter for supervision of other normal pregnancy, first trimester 
    Z34.81     Active  45499686

 

 Problem  UTI (urinary tract infection) in pregnancy in first trimester     
O23.41     Active  499209309

 

 Problem  Chronic GERD     K21.9     Active  135295173

 

 Problem  Previous  section     Z98.891     Active  579594036

 

 Problem  Nausea and vomiting during pregnancy     O21.9     Active  84383320

 

 Problem  Antepartum hemorrhage in first trimester     O20.9     Active  
94845895







ALLERGIES

No Information



ENCOUNTERS







 Encounter  Location  Date  Diagnosis

 

 [facility]  [address] [id number] [id number]  
06 Sep, 2018   

 

 [facility]  [address] [facility] [id number] [id number]  16 Aug, 
2018  Missed  O02.1

 

 [facility]  [id number] [id number]  14 Aug, 
2018   

 

 [facility]  [id number] [place] [id number]  09 Aug, 
2018  Encounter for supervision of other normal pregnancy, first trimester 
Z34.81

 

 [facility]  [address] [id number] [id number]  
01 Aug, 2018   

 

 [facility]  [address] [facility] [id number] [id number]     

 

 [facility]  [address] [id number] [id number]    Encounter for supervision of other normal pregnancy, first trimester 
Z34.81 ; Nausea and vomiting during pregnancy O21.9 ; UTI (urinary tract 
infection) in pregnancy in first trimester O23.41 ; Antepartum hemorrhage in 
first trimester O20.9 ; Encounter for BCP (birth control pills) initial 
prescription Z30.011 ; Chronic GERD K21.9 ; Previous  section Z98.891 
and Drug use complicating pregnancy, first trimester O99.321

 

 [facility]  [id number] [place] [id number]  01 Mar, 
2018   

 

 [place] [address] [id number] [place] [phone]     

 

 [facility]  [address] [place] [id number]    Encounter for BCP (birth control pills) initial prescription Z30.011 ; 
Chronic GERD K21.9 ; Acute bilateral low back pain without sciatica M54.5 ; 
Underweight R63.6 and Muscle spasm of back M62.830

 

 [place] [address] [place] [phone]
[phone]  21 Sep, 2017  Exposure to hepatitis C Z20.5

 

 [place] [address] [place] [phone]  18 Sep, 2017   







IMMUNIZATIONS

No Known Immunizations



SOCIAL HISTORY

Never Assessed



REASON FOR VISIT

med question



PLAN OF CARE





VITAL SIGNS





MEDICATIONS

Unknown Medications



RESULTS

No Results



PROCEDURES

No Known procedures



INSTRUCTIONS





MEDICATIONS ADMINISTERED

No Known Medications



MEDICAL (GENERAL) HISTORY







 Type  Description  Date

 

 Surgical History  tonsillectomy and adenoidectomy   

 

 Surgical History   section  2017

 

 Hospitalization History  vaginal bleeding  18

 

 Hospitalization History  ER visit, bleeding  18
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
## 2018-12-08 NOTE — XMS REPORT
[facility]

 Created on: 2018



Jeannie Skelton

External Reference #: [id number]

: 1995

Sex: Female



Demographics







 Address  [address] [place] [address]  [address]

 

 Preferred Language  Unknown

 

 Marital Status  Unknown

 

 Uatsdin Affiliation  Unknown

 

 Race  Unknown

 

 Ethnic Group  Unknown





Author







 Author  THEA COOK  [facility]

 

 Address  [address] [place]  [address]



 

 Phone  [phone]







Care Team Providers







 Care Team Member Name  Role  Phone

 

 THEA COOK  Unavailable  [phone]







PROBLEMS







 Type  Condition  ICD9-CM Code  BRV00-QV Code  Onset Dates  Condition Status  
SNOMED Code

 

 Problem  Drug use complicating pregnancy, first trimester     O99.321     
Active  72480909

 

 Problem  Encounter for supervision of other normal pregnancy, first trimester 
    Z34.81     Active  64947022

 

 Problem  UTI (urinary tract infection) in pregnancy in first trimester     
O23.41     Active  881537924

 

 Problem  Chronic GERD     K21.9     Active  560586352

 

 Problem  Previous  section     Z98.891     Active  015763752

 

 Problem  Nausea and vomiting during pregnancy     O21.9     Active  16161977

 

 Problem  Antepartum hemorrhage in first trimester     O20.9     Active  
57598647







ALLERGIES

No Known Allergies



ENCOUNTERS







 Encounter  Location  Date  Diagnosis

 

 [facility]  [address] [facility] [id number] [id number]  13 Sep, 
2018   

 

 [facility]  [address] [id number], [place] [phone]  
06 Sep, 2018   

 

 [facility]  [address] [facility] [id number], [place] [id number]  16 Aug, 
2018  Missed  O02.1

 

 [facility]  [address] [id number], [place] [id number]  14 Aug, 
2018   

 

 [facility]  [address] [id number], [place] [id number]  09 Aug, 
2018  Encounter for supervision of other normal pregnancy, first trimester 
Z34.81

 

 [facility]  [address] [id number], [place] [id number]  
01 Aug, 2018   

 

 [facility]  [address] [id number], [place] [phone]     

 

 [facility] [address]65[address] [id number]    Encounter for supervision of other normal pregnancy, first trimester 
Z34.81 ; Nausea and vomiting during pregnancy O21.9 ; UTI (urinary tract 
infection) in pregnancy in first trimester O23.41 ; Antepartum hemorrhage in 
first trimester O20.9 ; Encounter for BCP (birth control pills) initial 
prescription Z30.011 ; Chronic GERD K21.9 ; Previous  section Z98.891 
and Drug use complicating pregnancy, first trimester O99.321

 

 [facility]  [id number] [place] [id number]  01 Mar, 
2018   

 

 [place] [address] [phone]     

 

 [address] [place] [id number]    Encounter for BCP (birth control pills) initial prescription Z30.011 ; 
Chronic GERD K21.9 ; Acute bilateral low back pain without sciatica M54.5 ; 
Underweight R63.6 and Muscle spasm of back M62.830

 

 [place] [id number] [place] [phone]  21 Sep, 2017  Exposure to hepatitis C Z20.5

 

 [place] [id number] [place] [phone]  18 Sep, 2017   







IMMUNIZATIONS

No Known Immunizations



SOCIAL HISTORY

Never Assessed



REASON FOR VISIT

Hospital f/u for possible miscarriage---ELLIOTT harden



PLAN OF CARE







 Activity  Details









  









 Follow Up  4 Weeks Reason:

 

 Pending Test  PAP REFLEX TO HPV IF ASCUS 



 



VITAL SIGNS







 Height  63 in  2018

 

 Weight  95.8 lbs  2018

 

 Temperature  97.1 degrees Fahrenheit  2018

 

 Heart Rate  82 bpm  2018

 

 Respiratory Rate  16   2018

 

 BMI  16.97 kg/m2  2018

 

 Blood pressure systolic  122 mmHg  2018

 

 Blood pressure diastolic  80 mmHg  2018







MEDICATIONS







 Medication  Instructions  Dosage  Frequency  Start Date  End Date  Duration  
Status

 

 Prenat-Fe Bisgly-FA-w/o Vit A 32-1 MG  Orally Once a day  1 tablet  24h       30 day(s)  Active

 

 Zofran ODT 4 MG  Orally every 4 hrs  1 tablet on the tongue and allow to 
dissolve as needed  4h       30 days  Active

 

 Keflex 500 mg  Orally 3 times a day  1 capsule  8h  
  10 day(s)  Active







RESULTS

No Results



PROCEDURES







 Procedure  Date Ordered  Result  Body Site

 

 TRANSVAGINAL US, OBSTETRIC  2018      

 

 SPECIMEN HANDLING  2018      

 

 Bacterial Vaginosis In House  2018      

 

 LAB NOT BILLED BY [facility]  2018      

 

 VENIPUNCT, ROUTINE*  2018      

 

 URINALYSIS, AUTO, W/O SCOPE  2018      

 

 URINE PREGNANCY TEST  2018      

 

 DRUG TEST PRSMV DIR OPT OBS  2018      

 

 URINE-NO MICRO  2018      







INSTRUCTIONS





MEDICATIONS ADMINISTERED

No Known Medications



MEDICAL (GENERAL) HISTORY







 Type  Description  Date

 

 Surgical History  tonsillectomy and adenoidectomy   

 

 Surgical History   section  2017

 

 Hospitalization History  vaginal bleeding  18

 

 Hospitalization History  ER visit, bleeding  18

## 2018-12-08 NOTE — XMS REPORT
[facility]

 Created on: 10/31/2018



Jeannie Skelton

External Reference #: [id number]

: 1995

Sex: Female



Demographics







 Address  [address]  [address]

 

 Preferred Language  Unknown

 

 Marital Status  Unknown

 

 Sabianist Affiliation  Unknown

 

 Race  Unknown

 

 Ethnic Group  Unknown





Author







 Author  MILENA BLACK

 

 [facility]

 

 Address  [address] [place]  [address]



 

 Phone  [phone]







Care Team Providers







 Care Team Member Name  Role  Phone

 

 WAYNE  MILENA  Unavailable  [phone]







PROBLEMS







 Type  Condition  ICD9-CM Code  XVW05-ZW Code  Onset Dates  Condition Status  
SNOMED Code

 

 Problem  Drug use complicating pregnancy, first trimester     O99.321     
Active  59855550

 

 Problem  Encounter for supervision of other normal pregnancy, first trimester 
    Z34.81     Active  31664549

 

 Problem  UTI (urinary tract infection) in pregnancy in first trimester     
O23.41     Active  005890555

 

 Problem  Chronic GERD     K21.9     Active  118528492

 

 Problem  Previous  section     Z98.891     Active  549548837

 

 Problem  Nausea and vomiting during pregnancy     O21.9     Active  36105684

 

 Problem  Antepartum hemorrhage in first trimester     O20.9     Active  
26685803







ALLERGIES

No Known Allergies



ENCOUNTERS







 Encounter  Location  Date  Diagnosis

 

 [facility]  [address] [facility] [id number] [place] [address] [id number]  24 Oct, 
2018  Contraception management Z30.9 ; Contraceptive education Z30.09 and Low 
weight R63.6

 

 [facility]  [address] [id number] [id number]  
06 Sep, 2018   

 

 [place] [address] [place] [id number] [place] [address] [phone]  16 Aug, 
2018  Missed  O02.1

 

 [facility]  [id number] [place] [address] [id number]  14 Aug, 
2018   

 

 [id number] [place] [address] [id number]  09 Aug, 
2018  Encounter for supervision of other normal pregnancy, first trimester 
Z34.81

 

 [facility]  [address] [id number] Bay[place] [id number]  
01 Aug, 2018   

 

 [place] [id number] [place] [address] [phone]     

 

 [facility]  [id number] [place] [phone]    Encounter for supervision of other normal pregnancy, first trimester 
Z34.81 ; Nausea and vomiting during pregnancy O21.9 ; UTI (urinary tract 
infection) in pregnancy in first trimester O23.41 ; Antepartum hemorrhage in 
first trimester O20.9 ; Encounter for BCP (birth control pills) initial 
prescription Z30.011 ; Chronic GERD K21.9 ; Previous  section Z98.891 
and Drug use complicating pregnancy, first trimester O99.321

 

 [facility]  [id number] [place] [address] [id number]  01 Mar, 
2018   

 

 [facility]  [address] [phone]     

 

 [facility]  [id number] [place] [address] [id number]    Encounter for BCP (birth control pills) initial prescription Z30.011 ; 
Chronic GERD K21.9 ; Acute bilateral low back pain without sciatica M54.5 ; 
Underweight R63.6 and Muscle spasm of back M62.830

 

 [place] [address] [place] [id number] [place] [phone]
[phone]  21 Sep, 2017  Exposure to hepatitis C Z20.5

 

 [place] [address] [place] [id number] [place] [phone]
[phone]  18 Sep, 2017   







IMMUNIZATIONS

No Known Immunizations



SOCIAL HISTORY

Never Assessed



REASON FOR VISIT

Concerned about weightloss, would like something to help gain, Would like to 
restart BC, has used [facility]  [facility]



PLAN OF CARE







 Activity  Details









  









 Follow Up  3 months, prn Reason:CHM/BC







VITAL SIGNS







 Height  63 in  2018-10-24

 

 Weight  100.2 lbs  2018-10-24

 

 Temperature  98.2 degrees Fahrenheit  2018-10-24

 

 Heart Rate  80 bpm  2018-10-24

 

 Respiratory Rate  16   2018-10-24

 

 BMI  17.75 kg/m2  2018-10-24

 

 Blood pressure systolic  110 mmHg  2018-10-24

 

 Blood pressure diastolic  68 mmHg  2018-10-24







MEDICATIONS







 Medication  Instructions  Dosage  Frequency  Start Date  End Date  Duration  
Status

 

 Megestrol Acetate 20 mg  Orally Twice a day  1 tablet  12h  24 Oct, 2018  23 
Nov, 2018  30 day(s)  Active

 

 Xanax 0.5 MG  Orally TID PRN  1 tablet     16 Aug, 2018     10 days  Active

 

 NADER 3-0.02 MG  Orally Once a day  1 tablet  24h  24 Oct, 2018     28 day(s)  
Active







RESULTS







 Name  Result  Date  Reference Range

 

 PREGNANCY TEST, URINE (IN HOUSE)     2018-10-24   

 

 RESULTS  negative      

 

 Lot #  [id number]      

 

 Control  +      

 

 Exp date  2020      







PROCEDURES







 Procedure  Date Ordered  Result  Body Site

 

 URINE PREGNANCY TEST  Oct 24, 2018      







INSTRUCTIONS





MEDICATIONS ADMINISTERED

No Known Medications



MEDICAL (GENERAL) HISTORY







 Type  Description  Date

 

 Medical History  anxiety   

 

 Surgical History  tonsillectomy and adenoidectomy   

 

 Surgical History   section  2017

 

 Hospitalization History  vaginal bleeding  18

 

 Hospitalization History  ER visit, bleeding  18

## 2018-12-08 NOTE — XMS REPORT
[facility]

 Created on: 2018



Jeannie Skelton

External Reference #: [id number]

: 1995

Sex: Female



Demographics







 Address  [address] [place] [address]  [address]

 

 Preferred Language  Unknown

 

 Marital Status  Unknown

 

 Church Affiliation  Unknown

 

 Race  Unknown

 

 Ethnic Group  Unknown





Author







 Author  THEA COOK  [facility]

 

 Address  [address] [place]  [address]



 

 Phone  [phone]







Care Team Providers







 Care Team Member Name  Role  Phone

 

 THEA COOK  Unavailable  [phone]







PROBLEMS







 Type  Condition  ICD9-CM Code  XBX59-KF Code  Onset Dates  Condition Status  
SNOMED Code

 

 Problem  Drug use complicating pregnancy, first trimester     O99.321     
Active  25072400

 

 Problem  Encounter for supervision of other normal pregnancy, first trimester 
    Z34.81     Active  86761932

 

 Problem  UTI (urinary tract infection) in pregnancy in first trimester     
O23.41     Active  437924957

 

 Problem  Chronic GERD     K21.9     Active  888025767

 

 Problem  Previous  section     Z98.891     Active  623475404

 

 Problem  Nausea and vomiting during pregnancy     O21.9     Active  18865114

 

 Problem  Antepartum hemorrhage in first trimester     O20.9     Active  
47757105







ALLERGIES

No Information



ENCOUNTERS







 Encounter  Location  Date  Diagnosis

 

 [facility]  [address] [facility] [id number], [place] [id number]  13 Sep, 
2018   

 

 [facility]  [address] [id number][place] [phone]  
06 Sep, 2018   

 

 [facility]  [address] [facility] [id number][place] [id number]  16 Aug, 
2018  Missed  O02.1

 

 [facility]  [address] [id number], [place] [id number]  14 Aug, 
2018   

 

 [facility]  [address] [id number], [place] [phone]  09 Aug, 
2018  Encounter for supervision of other normal pregnancy, first trimester 
Z34.81

 

 [facility]  [address] [id number], [place] [id number]  
01 Aug, 2018   

 

 [facility]  [address] [id number], [place] [id number]     

 

 [facility] SystemsKAMILLA[address]00565100[place], [place] [phone]    Encounter for supervision of other normal pregnancy, first trimester 
Z34.81 ; Nausea and vomiting during pregnancy O21.9 ; UTI (urinary tract 
infection) in pregnancy in first trimester O23.41 ; Antepartum hemorrhage in 
first trimester O20.9 ; Encounter for BCP (birth control pills) initial 
prescription Z30.011 ; Chronic GERD K21.9 ; Previous  section Z98.891 
and Drug use complicating pregnancy, first trimester O99.321

 

 [facility]  [id number] [place] [id number]  01 Mar, 
2018   

 

 [place] [address] [place] [id number] [place] [phone]     

 

 [address] [place] [id number]    Encounter for BCP (birth control pills) initial prescription Z30.011 ; 
Chronic GERD K21.9 ; Acute bilateral low back pain without sciatica M54.5 ; 
Underweight R63.6 and Muscle spasm of back M62.830

 

 [place] [address] [place] [phone]
[phone]  21 Sep, 2017  Exposure to hepatitis C Z20.5

 

 [place] [address] [place] [id number] [place] [phone]
[phone]  18 Sep, 2017   







IMMUNIZATIONS

No Known Immunizations



SOCIAL HISTORY

Never Assessed



REASON FOR VISIT





PLAN OF CARE





VITAL SIGNS





MEDICATIONS

Unknown Medications



RESULTS

No Results



PROCEDURES

No Known procedures



INSTRUCTIONS





MEDICATIONS ADMINISTERED

No Known Medications



MEDICAL (GENERAL) HISTORY







 Type  Description  Date

 

 Surgical History  tonsillectomy and adenoidectomy   

 

 Surgical History   section  2017

 

 Hospitalization History  vaginal bleeding  18

 

 Hospitalization History  ER visit, bleeding  18

## 2018-12-08 NOTE — XMS REPORT
[facility]

 Created on: 2018



Jeannie Skelton

External Reference #: [id number]

: 1995

Sex: Female



Demographics







 Address  [address] [place] [address]  [address]

 

 Preferred Language  Unknown

 

 Marital Status  Unknown

 

 Baptism Affiliation  Unknown

 

 Race  Unknown

 

 Ethnic Group  Unknown





Author







 Author  THEA COOK  [facility]

 

 Address  [address] [place]  [address]



 

 Phone  [phone]







Care Team Providers







 Care Team Member Name  Role  Phone

 

 THEA COOK  Unavailable  [phone]







PROBLEMS







 Type  Condition  ICD9-CM Code  ORU66-MU Code  Onset Dates  Condition Status  
SNOMED Code

 

 Problem  Drug use complicating pregnancy, first trimester     O99.321     
Active  10623794

 

 Problem  Encounter for supervision of other normal pregnancy, first trimester 
    Z34.81     Active  47371377

 

 Problem  UTI (urinary tract infection) in pregnancy in first trimester     
O23.41     Active  475405232

 

 Problem  Chronic GERD     K21.9     Active  970107815

 

 Problem  Previous  section     Z98.891     Active  265889861

 

 Problem  Nausea and vomiting during pregnancy     O21.9     Active  46267758

 

 Problem  Antepartum hemorrhage in first trimester     O20.9     Active  
70090708







ALLERGIES

No Known Allergies



ENCOUNTERS







 Encounter  Location  Date  Diagnosis

 

 [facility]  [address] [id number] Creek, [place] [id number]  
06 Sep, 2018   

 

 [facility]  [address] [facility] [id number], [place] [id number]  16 Aug, 
2018  Missed  O02.1

 

 [facility] [place]  [id number], [place] [id number]  14 Aug, 
2018   

 

 [facility] [place] [id number] [place] [id number]  09 Aug, 
2018  Encounter for supervision of other normal pregnancy, first trimester 
Z34.81

 

 [facility]  [address] [id number] Creek, [place] [id number]  
01 Aug, 2018   

 

 [facility]  [address] [facility] [id number], [place] [phone]     

 

 [facility] [address]00565100[place], [place] [id number]    Encounter for supervision of other normal pregnancy, first trimester 
Z34.81 ; Nausea and vomiting during pregnancy O21.9 ; UTI (urinary tract 
infection) in pregnancy in first trimester O23.41 ; Antepartum hemorrhage in 
first trimester O20.9 ; Encounter for BCP (birth control pills) initial 
prescription Z30.011 ; Chronic GERD K21.9 ; Previous  section Z98.891 
and Drug use complicating pregnancy, first trimester O99.321

 

 [facility]  [id number] [place] [id number]  01 Mar, 
2018   

 

 [facility]  [address] [place] [id number] [place] [phone]
[phone]     

 

 [facility]  [id number] [place] [id number]    Encounter for BCP (birth control pills) initial prescription Z30.011 ; 
Chronic GERD K21.9 ; Acute bilateral low back pain without sciatica M54.5 ; 
Underweight R63.6 and Muscle spasm of back M62.830

 

 [place] [address] [place] [phone]
[phone]  21 Sep, 2017  Exposure to hepatitis C Z20.5

 

 [place] [address] [place] [phone]
[phone]  18 Sep, 2017   







IMMUNIZATIONS

No Known Immunizations



SOCIAL HISTORY

Never Assessed



REASON FOR VISIT

OB f/u. Was seen at [facility] ED for SAB 2018 



PLAN OF CARE







 Activity  Details









  









 Follow Up  2 Weeks, 2 Weeks Reason:







VITAL SIGNS







 Height  63 in  2018

 

 Weight  98.2 lbs  2018

 

 Temperature  98.2 degrees Fahrenheit  2018

 

 Heart Rate  112 bpm  2018

 

 Respiratory Rate  16   2018

 

 BMI  17.395 kg/m2  2018

 

 Blood pressure systolic  100 mmHg  2018

 

 Blood pressure diastolic  60 mmHg  2018







MEDICATIONS







 Medication  Instructions  Dosage  Frequency  Start Date  End Date  Duration  
Status

 

 Prenat-Fe Bisgly-FA-w/o Vit A 32-1 MG  Orally Once a day  1 tablet  24h       30 day(s)  Active

 

 Zofran ODT 4 MG  Orally every 4 hrs  1 tablet on the tongue and allow to 
dissolve as needed  4h       30 days  Active

 

 Ibuprofen 600 MG  Orally every 6 hours  1 tablet with food or milk as needed  
6h  16 Aug, 2018  25 Sep, 2018  10 days  Active

 

 Xanax 0.5 MG  Orally TID PRN  1 tablet     16 Aug, 2018     10 days  Active

 

 Norco 5-325 MG  Orally every 6 hrs  1-2 tablet as needed  6h  16 Aug, 2018  23 
Aug, 2018  07 days  Active

 

 Ondansetron 4 MG  Orally every 4 hrs  1 tablet on the tongue and allow to 
dissolve as needed  4h  16 Aug, 2018     07 days  Active

 

 Cytotec 200 MCG  Orally one time, repeat in 24 hours if needed  4 tablet     
16 Aug, 2018  18 Aug, 2018  2 days  Active







RESULTS

No Results



PROCEDURES







 Procedure  Date Ordered  Result  Body Site

 

 TRANSVAGINAL US, OBSTETRIC  Aug 16, 2018      







INSTRUCTIONS





MEDICATIONS ADMINISTERED

No Known Medications



MEDICAL (GENERAL) HISTORY







 Type  Description  Date

 

 Surgical History  tonsillectomy and adenoidectomy   

 

 Surgical History   section  2017

 

 Hospitalization History  vaginal bleeding  18

 

 Hospitalization History  ER visit, bleeding  18

## 2018-12-08 NOTE — XMS REPORT
[facility]

 Created on: 2018



Jeannie Skelton

External Reference #: [id number]

: 1995

Sex: Female



Demographics







 Address  [address] [place] [address]  [address]

 

 Preferred Language  Unknown

 

 Marital Status  Unknown

 

 Yarsani Affiliation  Unknown

 

 Race  Unknown

 

 Ethnic Group  Unknown





Author







 Author  THEA COOK  [facility]

 

 Address  [address] [place]  [address]



 

 Phone  [phone]







Care Team Providers







 Care Team Member Name  Role  Phone

 

 THEA COOK  Unavailable  [phone]







PROBLEMS







 Type  Condition  ICD9-CM Code  SYI09-ML Code  Onset Dates  Condition Status  
SNOMED Code

 

 Problem  Drug use complicating pregnancy, first trimester     O99.321     
Active  37436692

 

 Problem  Encounter for supervision of other normal pregnancy, first trimester 
    Z34.81     Active  12181348

 

 Problem  UTI (urinary tract infection) in pregnancy in first trimester     
O23.41     Active  757520149

 

 Problem  Chronic GERD     K21.9     Active  729763565

 

 Problem  Previous  section     Z98.891     Active  200973134

 

 Problem  Nausea and vomiting during pregnancy     O21.9     Active  20730968

 

 Problem  Antepartum hemorrhage in first trimester     O20.9     Active  
69253326







ALLERGIES

No Information



ENCOUNTERS







 Encounter  Location  Date  Diagnosis

 

 [facility]  [address] [id number], [place] [id number]  
06 Sep, 2018   

 

 [facility]  [address] [facility] [id number], [place] [id number]  16 Aug, 
2018  Missed  O02.1

 

 [facility] [place]  [id number], [place] [id number]  14 Aug, 
2018   

 

 [facility] [place]  [id number] [place] [id number]  09 Aug, 
2018  Encounter for supervision of other normal pregnancy, first trimester 
Z34.81

 

 [facility]  [address] [id number], [place] [id number]  
01 Aug, 2018   

 

 [facility]  [address] [facility] [id number], [place] [id number]     

 

 [facility] [place]  [address] [place] [id number], [place] [id number]    Encounter for supervision of other normal pregnancy, first trimester 
Z34.81 ; Nausea and vomiting during pregnancy O21.9 ; UTI (urinary tract 
infection) in pregnancy in first trimester O23.41 ; Antepartum hemorrhage in 
first trimester O20.9 ; Encounter for BCP (birth control pills) initial 
prescription Z30.011 ; Chronic GERD K21.9 ; Previous  section Z98.891 
and Drug use complicating pregnancy, first trimester O99.321

 

 [facility]  [id number] [place] [address] [id number]  01 Mar, 
2018   

 

 [place] [address] [place] [id number] [place] [phone]
[phone]     

 

 [facility]  [address] [place] [phone]    Encounter for BCP (birth control pills) initial prescription Z30.011 ; 
Chronic GERD K21.9 ; Acute bilateral low back pain without sciatica M54.5 ; 
Underweight R63.6 and Muscle spasm of back M62.830

 

 [place] [address] [place] [phone]
[phone]  21 Sep, 2017  Exposure to hepatitis C Z20.5

 

 [place] [address] [place] [address] [phone]
[phone]  18 Sep, 2017   







IMMUNIZATIONS

No Known Immunizations



SOCIAL HISTORY

Never Assessed



REASON FOR VISIT

ER Notification/Follow up



PLAN OF CARE





VITAL SIGNS





MEDICATIONS

Unknown Medications



RESULTS

No Results



PROCEDURES

No Known procedures



INSTRUCTIONS





MEDICATIONS ADMINISTERED

No Known Medications



MEDICAL (GENERAL) HISTORY







 Type  Description  Date

 

 Surgical History  tonsillectomy and adenoidectomy   

 

 Surgical History   section  2017

 

 Hospitalization History  vaginal bleeding  18

 

 Hospitalization History  ER visit, bleeding  18

## 2018-12-08 NOTE — XMS REPORT
Continuity of Care Document

 Created on: 2018



ALEJANDRINA SCHAEFFER

External Reference #: [id number]

: 1995

Sex: Female



Demographics







 Address  [address] [place] [address]  [address]

 

 Home Phone  [phone] x

 

 Preferred Language  Unknown

 

 Marital Status  Unknown

 

 Restorationism Affiliation  Unknown

 

 Race  Unknown

 

 Ethnic Group  Unknown





Author







 Author  [facility]

 

 Organization  [facility]

 

 Address  Unknown

 

 Phone  Unavailable



              



Allergies

      





 Active            Description            Code            Type            
Severity            Reaction            Onset            Reported/Identified   
         Relationship to Patient            Clinical Status        

 

 Yes            kiwi            [id number]            Drug Allergy            
Unknown            N/A                         2014                      
            



                  



Medications

      



There is no data.                  



Problems

      





 Date Dx Coded            Attending            Type            Code            
Diagnosis            Diagnosed By        

 

 2010                         Ot            826.0                        
          

 

 2010                         Ot            959.7                        
          

 

 2010                         Ot            E000.8                       
           

 

 2010                         Ot            E849.0                       
           

 

 2010                         Ot            E917.9                       
           

 

 2011                         Ot            276.51                       
           

 

 2011                         Ot            493.00                       
           

 

 2011                         Ot            536.2                        
          

 

 2011                         Ot            564.1            IRRITABLE 
BOWEL SYNDROME                     

 

 2011                         Ot            789.01            ABDOMINAL 
PAIN, RIGHT UPPER QUADRANT                     

 

 2014            LALA MCCOY            Ot            599.0    
        URIN TRACT INFECTION NOS                     

 

 2014            LALA MCCOY            Ot            620.2    
        OVARIAN CYST NEC/NOS                     

 

 2014            LALA MCCOY            Ot            623.8    
        NONINFLAM DIS VAGINA NEC                     

 

 2014            LALA MCCOY            Ot            789.09   
         ABDOMINAL PAIN, OTHER SPECIFIED SITE                     

 

 2014            LALA MCCOY            Ot            V69.2    
        HIGH-RISK SEXUAL BEHAVIOR                     

 

 2015                         Ot            611.72                       
           

 

 2015                         Ot            611.72                       
           

 

 2015            RACH LARA APRN            Ot            599.0      
      URIN TRACT INFECTION NOS                     

 

 2015            RACH LARA APRVALERIE            Ot            681.00     
       CELLULITIS, FINGER NOS                     

 

 2015            RACH LARA APRVALERIE            Ot            883.0      
      OPEN WOUND OF FINGER                     

 

 2015            RACH LARA APRVALERIE            Ot            883.1      
      OPEN WOUND FINGER-COMPL                     

 

 2015            RACH LARA APRN            Ot            E000.8     
       OTHER EXTERNAL CAUSE STATUS                     

 

 2015            LARA, PETER J APRN            Ot            E849.0     
       ACCIDENT IN HOME                     

 

 2015            RACH LARA            Ot            E920.8     
       ACC-CUTTING INSTRUM NEC                     

 

 2017                         Ot            611.72            LUMP OR 
MASS IN BREAST                     

 

 2017            VAL TRAMMELL, NORMAN RANGEL Ot            
O32.1XX0            MATERNAL CARE FOR BREECH PRESENTATION, U                   
  

 

 2017            NORMAN NEAL MD, Ot            
O41.03X0            OLIGOHYDRAMNIOS, THIRD TRIMESTER, NOT AP                   
  

 

 2017            NORMAN NEAL MD, Ot            
O99.824            STREPTOCOCCUS B CARRIER STATE COMPLICATI                     

 

 2017            NORMAN NEAL MD            Ot            Z23  
          ENCOUNTER FOR IMMUNIZATION                     

 

 2017            NORMAN NEAL MD, Ot            Z37.0
            SINGLE LIVE BIRTH                     

 

 2017            NORMAN NEAL MD, Ot            
Z3A.38            38 WEEKS GESTATION OF PREGNANCY                     

 

 2018            RACH LARA            Ot            N83.291    
        OTHER OVARIAN CYST, RIGHT SIDE                     

 

 2018            RACH LARA            Ot            O20.0      
      THREATENED                      

 

 2018            RACH LARA            Ot            O34.80     
       MATERNAL CARE FOR OTH ABNLT OF PELVIC OR                     

 

 2018            RACH LARA            Ot            O99.89     
       OTH DISEASES AND CONDITIONS COMPL PREG/C                     

 

 2018            RACH LARA            Ot            Z3A.00     
       WEEKS OF GESTATION OF PREGNANCY NOT SPEC                     

 

 2018            RACH LARA            Ot            N83.291    
        OTHER OVARIAN CYST, RIGHT SIDE                     

 

 2018            RACH LARA            Ot            O20.0      
      THREATENED                      

 

 2018            RACH LARA            Ot            O34.80     
       MATERNAL CARE FOR OTH ABNLT OF PELVIC OR                     

 

 2018            RACH LARA            Ot            O99.89     
       OTH DISEASES AND CONDITIONS COMPL PREG/C                     

 

 2018            RACH LARA            Ot            Z3A.00     
       WEEKS OF GESTATION OF PREGNANCY NOT SPEC                     

 

 2018            ANGELA SHEARERP            Ot            O20.0            
THREATENED                      

 

 2018            ANGELA SHEARERP            Ot            O20.9            
HEMORRHAGE IN EARLY PREGNANCY, UNSPECIFI                     

 

 2018            MANFRED, ANGELA ARNP            Ot            Z3A.01          
  LESS THAN 8 WEEKS GESTATION OF PREGNANCY                     

 

 2018            ANGELA SHEARER            Ot            Z87.59          
  PERSONAL HISTORY OF COMP OF PREG, CHLDBR                     

 

 2018            ANGELA SHEARER            Ot            Z90.89          
  ACQUIRED ABSENCE OF OTHER ORGANS                     

 

 2018            FENECH TC SCHROEDER            Ot            R10.2     
       PELVIC AND PERINEAL PAIN                     

 

 2018            ZHANGECH TC SCHROEDER            Ot            Z3A.01    
        LESS THAN 8 WEEKS GESTATION OF PREGNANCY                     

 

 2018                         Ot            O03.9            COMPLETE OR 
UNSP SPONTANEOUS  WI                     

 

 2018                         Ot            O99.89            OTH 
DISEASES AND CONDITIONS COMPL PREG/C                     

 

 2018                         Ot            Z3A.11            11 WEEKS 
GESTATION OF PREGNANCY                     

 

 2018                         Ot            Z90.89            ACQUIRED 
ABSENCE OF OTHER ORGANS                     

 

 2018                         Ot            Z98.890            OTHER 
SPECIFIED POSTPROCEDURAL STATES                     

 

 2018                         Ot            O03.4            INCOMPLETE 
SPONTANEOUS  WITHOUT                      

 

 2018                         Ot            Z87.59            PERSONAL 
HISTORY OF COMP OF PREG, CHLDBR                     

 

 2018                         Ot            Z90.89            ACQUIRED 
ABSENCE OF OTHER ORGANS                     

 

 2018                         Ot            O03.9            COMPLETE OR 
UNSP SPONTANEOUS  [place]I                     

 

 2018                         Ot            O99.89            OTH 
DISEASES AND CONDITIONS COMPL PREG/C                     

 

 2018                         Ot            Z3A.11            11 WEEKS 
GESTATION OF PREGNANCY                     

 

 2018                         Ot            Z90.89            ACQUIRED 
ABSENCE OF OTHER ORGANS                     

 

 2018                         Ot            Z98.890            OTHER 
SPECIFIED POSTPROCEDURAL STATES                     

 

 2018                         Ot            O03.4            INCOMPLETE 
SPONTANEOUS  WITHOUT                      

 

 2018                         Ot            Z87.59            PERSONAL 
HISTORY OF COMP OF PREG, CHLDBR                     

 

 2018                         Ot            Z90.89            ACQUIRED 
ABSENCE OF OTHER ORGANS                     



                                                                               
                                                                 



Procedures

      





 Code            Description            Performed By            Performed On   
     

 

             [id number]                                  EXTRACTION OF POC, LOW 
CERVICAL, OPEN AP                                   2017        



                  



Results

      





 Test            Result            Range        









 Complete blood count (CBC) with automated white blood cell (WBC) differential 
- 17 09:35         









 Blood leukocytes automated count (number/volume)            10.3 10*3/uL      
      4.3-11.0        

 

 Blood erythrocytes automated count (number/volume)            4.18 10*6/uL    
        4.35-5.85        

 

 Venous blood hemoglobin measurement (mass/volume)            12.5 g/dL        
    11.5-16.0        

 

 Blood hematocrit (volume fraction)            36 %            35-52        

 

 Automated erythrocyte mean corpuscular volume            86 [foz_us]          
  80-99        

 

 Automated erythrocyte mean corpuscular hemoglobin (mass per erythrocyte)      
      30 pg            25-34        

 

 Automated erythrocyte mean corpuscular hemoglobin concentration measurement (
mass/volume)            35 g/dL            32-36        

 

 Automated erythrocyte distribution width ratio            12.8 %            
10.0-14.5        

 

 Automated blood platelet count (count/volume)            179 10*3/uL          
  130-400        

 

 Automated blood platelet mean volume measurement            11.1 [foz_us]     
       7.4-10.4        

 

 Automated blood neutrophils/100 leukocytes            78 %            42-75   
     

 

 Automated blood lymphocytes/100 leukocytes            15 %            12-44   
     

 

 Blood monocytes/100 leukocytes            7 %            0-12        

 

 Automated blood eosinophils/100 leukocytes            1 %            0-10     
   

 

 Automated blood basophils/100 leukocytes            0 %            0-10        

 

 Blood neutrophils automated count (number/volume)            8.0 10*3         
   1.8-7.8        

 

 Blood lymphocytes automated count (number/volume)            1.5 10*3         
   1.0-4.0        

 

 Blood monocytes automated count (number/volume)            0.7 10*3            
0.0-1.0        

 

 Automated eosinophil count            0.1 10*3/uL            0.0-0.3        

 

 Automated blood basophil count (count/volume)            0.0 10*3/uL          
  0.0-0.1        









 Blood type T Indirect antibody screen panel - 17 09:35         









 ABO+Rh group            BP             NRG        

 

 Transfusion band number            [id number]             [facility]        

 

 Blood group antibody screen            NEGATIVE             NRG        









 Complete blood count (CBC) with automated white blood cell (WBC) differential 
- 18 10:49         









 Blood leukocytes automated count (number/volume)            6.3 10*3/uL       
     4.3-11.0        

 

 Blood erythrocytes automated count (number/volume)            4.96 10*6/uL    
        4.35-5.85        

 

 Venous blood hemoglobin measurement (mass/volume)            15.2 g/dL        
    11.5-16.0        

 

 Blood hematocrit (volume fraction)            43 %            35-52        

 

 Automated erythrocyte mean corpuscular volume            87 [foz_us]          
  80-99        

 

 Automated erythrocyte mean corpuscular hemoglobin (mass per erythrocyte)      
      31 pg            25-34        

 

 Automated erythrocyte mean corpuscular hemoglobin concentration measurement (
mass/volume)            35 g/dL            32-36        

 

 Automated erythrocyte distribution width ratio            12.5 %            
10.0-14.5        

 

 Automated blood platelet count (count/volume)            271 10*3/uL          
  130-400        

 

 Automated blood platelet mean volume measurement            10.8 [foz_us]     
       7.4-10.4        

 

 Automated blood neutrophils/100 leukocytes            67 %            42-75   
     

 

 Automated blood lymphocytes/100 leukocytes            22 %            12-44   
     

 

 Blood monocytes/100 leukocytes            9 %            0-12        

 

 Automated blood eosinophils/100 leukocytes            3 %            0-10     
   

 

 Automated blood basophils/100 leukocytes            0 %            0-10        

 

 Blood neutrophils automated count (number/volume)            4.3 10*3         
   1.8-7.8        

 

 Blood lymphocytes automated count (number/volume)            1.4 10*3         
   1.0-4.0        

 

 Blood monocytes automated count (number/volume)            0.5 10*3            
0.0-1.0        

 

 Automated eosinophil count            0.2 10*3/uL            0.0-0.3        

 

 Automated blood basophil count (count/volume)            0.0 10*3/uL          
  0.0-0.1        









 Comprehensive metabolic panel - 18 10:49         









 Serum or plasma sodium measurement (moles/volume)            140 mmol/L       
     135-145        

 

 Serum or plasma potassium measurement (moles/volume)            3.5 mmol/L    
        3.6-5.0        

 

 Serum or plasma chloride measurement (moles/volume)            106 mmol/L     
               

 

 Carbon dioxide            24 mmol/L            21-32        

 

 Serum or plasma anion gap determination (moles/volume)            10 mmol/L   
         5-14        

 

 Serum or plasma urea nitrogen measurement (mass/volume)            6 mg/dL    
        7-18        

 

 Serum or plasma creatinine measurement (mass/volume)            0.68 mg/dL    
        0.60-1.30        

 

 Serum or plasma urea nitrogen/creatinine mass ratio            9             
NRG        

 

 Serum or plasma creatinine measurement with calculation of estimated 
glomerular filtration rate            >             NRG        

 

 Serum or plasma glucose measurement (mass/volume)            94 mg/dL         
           

 

 Serum or plasma calcium measurement (mass/volume)            9.9 mg/dL        
    8.5-10.1        

 

 Serum or plasma total bilirubin measurement (mass/volume)            1.0 mg/dL
            0.1-1.0        

 

 Serum or plasma alkaline phosphatase measurement (enzymatic activity/volume)  
          51 U/L                    

 

 Serum or plasma aspartate aminotransferase measurement (enzymatic activity/
volume)            16 U/L            5-34        

 

 Serum or plasma alanine aminotransferase measurement (enzymatic activity/volume
)            12 U/L            0-55        

 

 Serum or plasma protein measurement (mass/volume)            7.5 g/dL         
   6.4-8.2        

 

 Serum or plasma albumin measurement (mass/volume)            5.0 g/dL         
   3.2-4.5        









 Serum or plasma choriogonadotropin measurement (units/volume) - 18 10:49
         









 Serum or plasma choriogonadotropin measurement (units/volume)            1076 m
[iU]/mL            <5        









 Complete urinalysis with reflex to culture - 18 10:55         









 Urine color determination            YELLOW             NRG        

 

 Urine clarity determination            CLEAR             NRG        

 

 Urine pH measurement by test strip            7             5-9        

 

 Specific gravity of urine by test strip            1.010             1.016-
1.022        

 

 Urine protein assay by test strip, semi-quantitative            NEGATIVE      
       NEGATIVE        

 

 Urine glucose detection by automated test strip            NEGATIVE           
  NEGATIVE        

 

 Erythrocytes detection in urine sediment by light microscopy            
NEGATIVE             NEGATIVE        

 

 Urine ketones detection by automated test strip            3+             
NEGATIVE        

 

 Urine nitrite detection by test strip            NEGATIVE             NEGATIVE
        

 

 Urine total bilirubin detection by test strip            NEGATIVE             
NEGATIVE        

 

 Urine urobilinogen measurement by automated test strip (mass/volume)          
  NORMAL             NORMAL        

 

 Urine leukocyte esterase detection by dipstick            1+             
NEGATIVE        

 

 Automated urine sediment erythrocyte count by microscopy (number/high power 
field)            NONE             NRG        

 

 Automated urine sediment leukocyte count by microscopy (number/high power field
)            RARE             NRG        

 

 Bacteria detection in urine sediment by light microscopy            NEGATIVE  
           NRG        

 

 Squamous epithelial cells detection in urine sediment by light microscopy     
       0-2             NRG        

 

 Crystals detection in urine sediment by light microscopy            NONE      
       NRG        

 

 Casts detection in urine sediment by light microscopy            NONE         
    NRG        

 

 Mucus detection in urine sediment by light microscopy            NEGATIVE     
        NRG        

 

 Complete urinalysis with reflex to culture            NO             NRG      
  









 Complete blood count (CBC) with automated white blood cell (WBC) differential 
- 18 17:05         









 Blood leukocytes automated count (number/volume)            6.1 10*3/uL       
     4.3-11.0        

 

 Blood erythrocytes automated count (number/volume)            4.53 10*6/uL    
        4.35-5.85        

 

 Venous blood hemoglobin measurement (mass/volume)            13.8 g/dL        
    11.5-16.0        

 

 Blood hematocrit (volume fraction)            39 %            35-52        

 

 Automated erythrocyte mean corpuscular volume            87 [foz_us]          
  80-99        

 

 Automated erythrocyte mean corpuscular hemoglobin (mass per erythrocyte)      
      31 pg            25-34        

 

 Automated erythrocyte mean corpuscular hemoglobin concentration measurement (
mass/volume)            35 g/dL            32-36        

 

 Automated erythrocyte distribution width ratio            12.5 %            
10.0-14.5        

 

 Automated blood platelet count (count/volume)            269 10*3/uL          
  130-400        

 

 Automated blood platelet mean volume measurement            10.8 [foz_us]     
       7.4-10.4        

 

 Automated blood neutrophils/100 leukocytes            54 %            42-75   
     

 

 Automated blood lymphocytes/100 leukocytes            32 %            12-44   
     

 

 Blood monocytes/100 leukocytes            11 %            0-12        

 

 Automated blood eosinophils/100 leukocytes            4 %            0-10     
   

 

 Automated blood basophils/100 leukocytes            1 %            0-10        

 

 Blood neutrophils automated count (number/volume)            3.3 10*3         
   1.8-7.8        

 

 Blood lymphocytes automated count (number/volume)            1.9 10*3         
   1.0-4.0        

 

 Blood monocytes automated count (number/volume)            0.6 10*3            
0.0-1.0        

 

 Automated eosinophil count            0.2 10*3/uL            0.0-0.3        

 

 Automated blood basophil count (count/volume)            0.0 10*3/uL          
  0.0-0.1        









 Serum or plasma choriogonadotropin (pregnancy test) detection - 18 17:05
         









 Serum or plasma choriogonadotropin (pregnancy test) detection            
POSITIVE             NEGATIVE        









 Comprehensive metabolic panel - 18 17:05         









 Serum or plasma sodium measurement (moles/volume)            141 mmol/L       
     135-145        

 

 Serum or plasma potassium measurement (moles/volume)            3.3 mmol/L    
        3.6-5.0        

 

 Serum or plasma chloride measurement (moles/volume)            107 mmol/L     
               

 

 Carbon dioxide            25 mmol/L            21-32        

 

 Serum or plasma anion gap determination (moles/volume)            9 mmol/L    
        5-14        

 

 Serum or plasma urea nitrogen measurement (mass/volume)            8 mg/dL    
        7-18        

 

 Serum or plasma creatinine measurement (mass/volume)            0.73 mg/dL    
        0.60-1.30        

 

 Serum or plasma urea nitrogen/creatinine mass ratio            11             
NRG        

 

 Serum or plasma creatinine measurement with calculation of estimated 
glomerular filtration rate            >             NRG        

 

 Serum or plasma glucose measurement (mass/volume)            124 mg/dL        
            

 

 Serum or plasma calcium measurement (mass/volume)            9.5 mg/dL        
    8.5-10.1        

 

 Serum or plasma total bilirubin measurement (mass/volume)            0.7 mg/dL
            0.1-1.0        

 

 Serum or plasma alkaline phosphatase measurement (enzymatic activity/volume)  
          50 U/L                    

 

 Serum or plasma aspartate aminotransferase measurement (enzymatic activity/
volume)            12 U/L            5-34        

 

 Serum or plasma alanine aminotransferase measurement (enzymatic activity/volume
)            9 U/L            0-55        

 

 Serum or plasma protein measurement (mass/volume)            7.0 g/dL         
   6.4-8.2        

 

 Serum or plasma albumin measurement (mass/volume)            4.8 g/dL         
   3.2-4.5        









 Serum or plasma choriogonadotropin measurement (units/volume) - 18 17:05
         









 Serum or plasma choriogonadotropin measurement (units/volume)            3410 m
[iU]/mL            <5        









 CULTURE, GENITAL - 18 09:55         









 CULTURE, GENITAL            SEE NOTE             NRG        









 GC/CHLAMYDIA (SWAB OR URINE)-RAPID - 18 09:55         









 CHLAMYDIA TRACHOMATIS RNA, TMA            NOT DETECTED             NOT 
DETECTED        

 

 NEISSERIA GONORRHOEAE RNA, TMA            NOT DETECTED             NOT 
DETECTED        

 

 COMMENT                         NRG        









 [employer] PAP RFX HPV mRNA E6/E7 - 18 09:55         









 CLINICAL INFORMATION:            Pregnant             NRG        

 

 LMP:            18             NRG        

 

 PREV. PAP:            WNL             NRG        

 

 PREV. BX:            NONE             NRG        

 

 SOURCE:            [employer]             NRG        

 

 STATEMENT OF ADEQUACY:                         NRG        

 

 INTERPRETATION/RESULT:                         NRG        

 

 CYTOTECHNOLOGIST:                         NRG        

 

 COMMENT                         NRG        









 QNATAL - 18 13:52         









 NUMBER OF FETUSES?            1             NRG        

 

 ADVANCED MATERNAL AGE?            NO             NRG        

 

 ABNORMAL SABINE?            NO             NRG        

 

 ABNORMAL US?            NO             NRG        

 

 PERSONAL/FAM HISTORY?            NO             NRG        

 

 INTERPRETATION            SEE NOTE             NRG        

 

 TRISOMY 21 (T21)            Negative             NRG        

 

 TRISOMY 18 (T18)            Negative             NRG        

 

 TRISOMY 13 (T13)            Negative             NRG        

 

 Y CHROMOSOME            Detected             NRG        

 

 Y CHR. INTERPRETATION            SEE NOTE             NRG        

 

 SEX CHROMOSOME            No aneuploidy             NRG        

 

 SEX CHROMOSOME INTERP            SEE NOTE             NRG        

 

 MICRODELETION            Not detected             NRG        

 

 MICRODELETION INTERP            SEE NOTE             NRG        

 

 GESTATIONAL AGE(IN WEEKS)            10             NRG        

 

 GESTATIONAL AGE (IN DAYS)            1             NRG        

 

 FETAL FRACTION            6.69%             NRG        

 

 LABORATORY COMMENTS            SEE NOTE             NRG        

 

 LIMITATIONS            SEE NOTE             NRG        

 

 SPECIFICATIONS            SEE NOTE             NRG        

 

 METHODOLOGY            SEE NOTE             NRG        



                                          



Encounters

      





 ACCT No.            Visit Date/Time            Discharge            Status    
        Pt. Type            Provider            Facility            Loc./Unit  
          Complaint        

 

 [id number]            2018 16:46:00            2018 18:45:00    
        DIS            Emergency            ANGELA SHEARER            [facility]            ER            5 WEEKS PREGNANT AND 
STARTED BLEEDING        

 

 [id number]            2018 06:18:00            2018 23:59:59    
        CLS            Outpatient            FENTC PLEITEZ DO S            
[facility]            LAB            LOW HCG LEVELS      
  

 

 [id number]            2018 10:23:00            2018 12:15:00    
        DIS            Emergency            RACH LARA            [facility] 
[facility]            ER            POSSIBLY PG,ABD CRAMPING 
       

 

 [id number]            2018 14:34:00            2018 23:59:59    
        CLS            Preadmit            THEA COOK DO            
[facility]            REHAB            POSTPARTUM BACK 
PAIN        

 

 [id number]            2017 09:15:00            2017 13:35:00    
        DIS            Inpatient            NORMAN NEAL MD          
  [facility]            LDRP            BREECH 
PRESENTATION        

 

 [id number]            2015 18:14:00            2015 19:12:00    
        DIS            Emergency            RACH LARA            [facility] 
[facility]            ER            LEFT HAND FINGER LAC;ABD 
PAIN        

 

 [id number]            2014 11:04:00            2014 13:14:00    
        DIS            Emergency            LALA MCCOY            
[facility]            ER            ABD PAIN        

 

 [id number]            2018 23:06:00                                   
   Document Registration                                                       
     

 

 [id number]            2018 15:18:00                                   
   Document Registration                                                       
     

 

 [id number]            2011 13:31:00                                   
   Document Registration                                                       
     

 

 [id number]            2011 13:25:00                                   
   Document Registration                                                       
     

 

 [id number]            2011 11:22:00                                   
   Document Registration                                                       
     

 

 [id number]            2011 00:50:00                                   
   Document Registration                                                       
     

 

 [id number]            2010 22:18:00                                   
   Document Registration                                                       
     

 

 [id number]            2015 02:01:34                         ACT            
Document Registration                                                          
  

 

 [id number]            2018 13:40:00            2018 23:59:59          
  CLS            Outpatient            RAVI FELDMAN, ASUNCION                       
  MELIS[place]                     

 

 [id number]            2018 13:40:00                                      
Document Registration                                                          
  

 

 [id number]            2018 08:00:00                                      
Document Registration

## 2018-12-08 NOTE — XMS REPORT
[facility]

 Created on: 2018



CostaJeannie

External Reference #: [id number]

: 1995

Sex: Female



Demographics







 Address  [address] [place] [address]  [address]

 

 Preferred Language  Unknown

 

 Marital Status  Unknown

 

 Mu-ism Affiliation  Unknown

 

 Race  Unknown

 

 Ethnic Group  Unknown





Author







 Author  THEA COOK  [facility]

 

 Address  [address] [place]  [address]



 

 Phone  [phone]







Care Team Providers







 Care Team Member Name  Role  Phone

 

 THEA COOK  Unavailable  [phone]







PROBLEMS







 Type  Condition  ICD9-CM Code  OVX08-NJ Code  Onset Dates  Condition Status  
SNOMED Code

 

 Problem  Drug use complicating pregnancy, first trimester     O99.321     
Active  46268854

 

 Problem  Encounter for supervision of other normal pregnancy, first trimester 
    Z34.81     Active  85155578

 

 Problem  UTI (urinary tract infection) in pregnancy in first trimester     
O23.41     Active  745108056

 

 Problem  Chronic GERD     K21.9     Active  266025855

 

 Problem  Previous  section     Z98.891     Active  501499514

 

 Problem  Nausea and vomiting during pregnancy     O21.9     Active  70655660

 

 Problem  Antepartum hemorrhage in first trimester     O20.9     Active  
44329961







ALLERGIES

No Information



ENCOUNTERS







 Encounter  Location  Date  Diagnosis

 

 [facility]  [address] [id number] [id number]  18 Sep, 
2018   

 

 [facility]  [address] [id number] [id number]  
06 Sep, 2018   

 

 [facility]  [address] [id number][place] [id number]  16 Aug, 
2018  Missed  O02.1

 

 [facility] [place]  [address] [id number], [place] [id number]  14 Aug, 
2018   

 

 [facility] [place]  [address] [id number], [place] [id number]  09 Aug, 
2018  Encounter for supervision of other normal pregnancy, first trimester 
Z34.81

 

 [facility]  [address] [id number], [place] [id number]  
01 Aug, 2018   

 

 [facility] [place]  [address] [id number], [place] [id number]     

 

 [facility]  [address]65100[place] [id number]    Encounter for supervision of other normal pregnancy, first trimester 
Z34.81 ; Nausea and vomiting during pregnancy O21.9 ; UTI (urinary tract 
infection) in pregnancy in first trimester O23.41 ; Antepartum hemorrhage in 
first trimester O20.9 ; Encounter for BCP (birth control pills) initial 
prescription Z30.011 ; Chronic GERD K21.9 ; Previous  section Z98.891 
and Drug use complicating pregnancy, first trimester O99.321

 

 [facility]  [id number] [place] [id number]  01 Mar, 
2018   

 

 [place] [id number] [place] [phone]     

 

 [facility]  [address] [place] [id number]    Encounter for BCP (birth control pills) initial prescription Z30.011 ; 
Chronic GERD K21.9 ; Acute bilateral low back pain without sciatica M54.5 ; 
Underweight R63.6 and Muscle spasm of back M62.830

 

 [place] [address] [place] [phone]  21 Sep, 2017  Exposure to hepatitis C Z20.5

 

 [place] [address] [place] [phone]
[phone]  18 Sep, 2017   







IMMUNIZATIONS

No Known Immunizations



SOCIAL HISTORY

Never Assessed



REASON FOR VISIT

Lab (walk-in)  [facility]



PLAN OF CARE





VITAL SIGNS





MEDICATIONS

Unknown Medications



RESULTS

No Results



PROCEDURES







 Procedure  Date Ordered  Result  Body Site

 

 FETAL CHRMOML ANEUPLOIDY  Aug 09, 2018      

 

 VENIPUNCT, ROUTINE*  Aug 09, 2018      







INSTRUCTIONS





MEDICATIONS ADMINISTERED

No Known Medications



MEDICAL (GENERAL) HISTORY







 Type  Description  Date

 

 Surgical History  tonsillectomy and adenoidectomy   

 

 Surgical History   section  2017

 

 Hospitalization History  vaginal bleeding  18

 

 Hospitalization History  ER visit, bleeding  18

## 2018-12-08 NOTE — XMS REPORT
[facility]

 Created on: 2018



Jeannie Skelton

External Reference #: [id number]

: 1995

Sex: Female



Demographics







 Address  [address] [place] [address]  [address]

 

 Preferred Language  Unknown

 

 Marital Status  Unknown

 

 Taoist Affiliation  Unknown

 

 Race  Unknown

 

 Ethnic Group  Unknown





Author







 Author  THEA COOK  [facility]

 

 Address  [address] [place]  [address]



 

 Phone  [phone]







Care Team Providers







 Care Team Member Name  Role  Phone

 

 THEA COOK  Unavailable  [phone]







PROBLEMS







 Type  Condition  ICD9-CM Code  OXN06-RK Code  Onset Dates  Condition Status  
SNOMED Code

 

 Problem  Drug use complicating pregnancy, first trimester     O99.321     
Active  78122430

 

 Problem  Encounter for supervision of other normal pregnancy, first trimester 
    Z34.81     Active  06133717

 

 Problem  UTI (urinary tract infection) in pregnancy in first trimester     
O23.41     Active  952251493

 

 Problem  Chronic GERD     K21.9     Active  337799634

 

 Problem  Previous  section     Z98.891     Active  461395033

 

 Problem  Nausea and vomiting during pregnancy     O21.9     Active  79216360

 

 Problem  Antepartum hemorrhage in first trimester     O20.9     Active  
39659422







ALLERGIES

No Information



ENCOUNTERS







 Encounter  Location  Date  Diagnosis

 

 [facility]  [address] [facility] [id number] [place] [id number]  18 Sep, 
2018   

 

 [facility] [place] [id number] [place] [id number]  13 Sep, 
2018   

 

 [facility]  [address] [id number] [phone]  
06 Sep, 2018   

 

 [facility]  [address] [id number], [place] [id number]  16 Aug, 
2018  Missed  O02.1

 

 [facility]  [address] [place] [phone]  14 Aug, 
2018   

 

 [facility] [address] [place] [phone]  09 Aug, 
2018  Encounter for supervision of other normal pregnancy, first trimester 
Z34.81

 

 [facility]  [address] [id number], [place] [phone]  
01 Aug, 2018   

 

 [facility]  [id number], [place] [phone]     

 

 [facility]  [address] [facility] [id number], [place] [phone]    Encounter for supervision of other normal pregnancy, first trimester 
Z34.81 ; Nausea and vomiting during pregnancy O21.9 ; UTI (urinary tract 
infection) in pregnancy in first trimester O23.41 ; Antepartum hemorrhage in 
first trimester O20.9 ; Encounter for BCP (birth control pills) initial 
prescription Z30.011 ; Chronic GERD K21.9 ; Previous  section Z98.891 
and Drug use complicating pregnancy, first trimester O99.321

 

 [facility]  [address] [facility] [id number], [place] [phone]  01 Mar, 
2018   

 

 [facility]  [address] [place] [phone]     

 

 [facility]  [address] [facility] [id number], [place] [phone]    Encounter for BCP (birth control pills) initial prescription Z30.011 ; 
Chronic GERD K21.9 ; Acute bilateral low back pain without sciatica M54.5 ; 
Underweight R63.6 and Muscle spasm of back M62.830

 

 [place] [id number] [place] [phone]  21 Sep, 2017  Exposure to hepatitis C Z20.5

 

 [place] [address] [place] [phone]  18 Sep, 2017   







IMMUNIZATIONS

No Known Immunizations



SOCIAL HISTORY

Never Assessed



REASON FOR VISIT

phone call



PLAN OF CARE





VITAL SIGNS





MEDICATIONS

Unknown Medications



RESULTS

No Results



PROCEDURES

No Known procedures



INSTRUCTIONS





MEDICATIONS ADMINISTERED

No Known Medications



MEDICAL (GENERAL) HISTORY







 Type  Description  Date

 

 Surgical History  tonsillectomy and adenoidectomy   

 

 Surgical History   section  2017

 

 Hospitalization History  vaginal bleeding  18

 

 Hospitalization History  ER visit, bleeding  18

## 2018-12-11 ENCOUNTER — HOSPITAL ENCOUNTER (EMERGENCY)
Dept: HOSPITAL 75 - ER | Age: 23
Discharge: HOME | End: 2018-12-11
Payer: MEDICAID

## 2018-12-11 VITALS — SYSTOLIC BLOOD PRESSURE: 118 MMHG | DIASTOLIC BLOOD PRESSURE: 77 MMHG

## 2018-12-11 VITALS — BODY MASS INDEX: 17.48 KG/M2 | WEIGHT: 95 LBS | HEIGHT: 62 IN

## 2018-12-11 DIAGNOSIS — M25.562: Primary | ICD-10-CM

## 2018-12-11 DIAGNOSIS — Z90.89: ICD-10-CM

## 2018-12-11 DIAGNOSIS — Z98.890: ICD-10-CM

## 2018-12-11 PROCEDURE — 73562 X-RAY EXAM OF KNEE 3: CPT

## 2018-12-11 NOTE — DIAGNOSTIC IMAGING REPORT
INDICATION: Knee pain.



Three views were obtained.



FINDINGS: The alignment is normal. There is no fracture or

dislocation. Soft tissues are unremarkable.



IMPRESSION: No focal abnormality in the right knee



Dictated by: 



  Dictated on workstation # [id number]

## 2018-12-11 NOTE — ED LOWER EXTREMITY
General


Chief Complaint:  Lower Extremity


Stated Complaint:  HEALING GASH RIGHT LEG, SWELLING


Nursing Triage Note:  


PT PRESENTS TO ED USING CRUTCHES WITH COMPLAINTS OF R LOWER LEG PAIN. REPORTS 


SHE INJURED HER R AGUILAR ON SATURDAY AND WAS SEEN IN ED THAT DAY. PT REPORTS 


INCREASED SWELLING AND PAIN IN HER R LOWER LEG.


Nursing Sepsis Screen:  No Definite Risk


Source:  patient


Exam Limitations:  no limitations





History of Present Illness


Date Seen by Provider:  Dec 11, 2018


Time Seen by Provider:  18:09





Allergies and Home Medications


Allergies


Coded Allergies:  


     kiwi (Unverified  Allergy, Unknown, 14)





Home Medications


Hydrocodone Bit/Acetaminophen 1 Tab Tab, 1 EACH PO Q4-6HR PRN for PAIN-MODERATE


   Prescribed by: DOROTHY ESTRELLA on 18 190





Patient Home Medication List


Home Medication List Reviewed:  Yes





Past Medical-Social-Family Hx


Patient Social History


Alcohol Use:  Denies Use


Recreational Drug Use:  No


Smoking Status:  Never a Smoker


2nd Hand Smoke Exposure:  No


Recent Foreign Travel:  No


Contact w/Someone Who Travel:  No


Recent Infectious Disease Expo:  No


Recent Hopitalizations:  No





Immunizations Up To Date


PED Vaccines UTD:  No





Seasonal Allergies


Seasonal Allergies:  Yes





Past Medical History


Surgeries:  Yes (wisdom teeth)


Adenoidectomy,  Section, Tonsillectomy


Respiratory:  No


Cardiac:  No


Neurological:  No


Reproductive Disorders:  No


Female Reproductive Disorders:  Denies


Sexually Transmitted Disease:  No


HIV/AIDS:  No


Genitourinary:  No


Gastrointestinal:  No


Musculoskeletal:  No


Endocrine:  No


HEENT:  No


Cancer:  No


Psychosocial:  No


Integumentary:  No


Blood Disorders:  No


Adverse Reaction/Blood Tranf:  No





Family Medical History





Not obtainable due to adoption





Physical Exam


Vital Signs





Vital Signs - First Documented








 18





 18:09


 


Temp 98.3


 


Pulse 96


 


Resp 20


 


B/P (MAP) 118/77 (91)


 


Pulse Ox 100





Capillary Refill : Less Than 3 Seconds


Height, Weight, BMI


Height: 5'2.00"


Weight: 95lbs. 0.0oz. 43.368000sa; 26.9 BMI


Method:Stated





Progress/Results/Core Measures


Results/Orders


My Orders





Orders - DOROTHY ESTRELLA


Knee, Right, 3 Views (18 18:28)





Vital Signs/I&O











 18





 18:09


 


Temp 98.3


 


Pulse 96


 


Resp 20


 


B/P (MAP) 118/77 (91)


 


Pulse Ox 100














Blood Pressure Mean:  91











Departure


Impression





 Primary Impression:  


 Knee pain


 Qualified Codes:  M25.561 - Pain in right knee


Disposition:  01 HOME, SELF-CARE


Condition:  Stable/Unchanged





Departure-Patient Inst.


Decision time for Depature:  19:05


Referrals:  


NO,LOCAL PHYSICIAN (PCP/Family)


Primary Care Physician


Patient Instructions:  Knee Sprain (DC)





Add. Discharge Instructions:  


Wear the knee immobilizer at all times. Continue to use your crutches. 

Nonweightbearing until you follow up with [facility] for a recheck and 

possible further evaluation. Tylenol and ibuprofen as directed by the bottle 

for pain relief. Ice to the sore areas at 20 minute intervals. Use the pain 

medication that was provided for pain unrelieved by Tylenol and ibuprofen being 

sure that she do not exceed your daily Tylenol dose. All discharge instructions 

reviewed with patient and/or family. Voiced understanding.


Scripts


Hydrocodone Bit/Acetaminophen (Hydrocodone/Acetaminophen 5/325mg Tablet) 1 Tab 

Tab


1 EACH PO Q4-6HR PRN for PAIN-MODERATE MDD 10, #14 TAB


   Prov: DOROTHY ESTRELLA         18


Work/School Note:  Work Release Form   Date Seen in the Emergency Department:  

Dec 11, 2018


   Return to Work:  Dec 12, 2018


      Other Restrictions Listed Below:  Nonweightbearing











DOROTHY ESTRELLA Dec 11, 2018 19:07

## 2018-12-12 NOTE — XMS REPORT
Continuity of Care Document

 Created on: 2018



ALEJANDRINA SCHAEFFER

External Reference #: [id number]

: 1995

Sex: Female



Demographics







 Address  [address] [place] [address]  [address]

 

 Home Phone  [phone] x

 

 Preferred Language  Unknown

 

 Marital Status  Unknown

 

 [employer]od[employer] Affiliation  Unknown

 

 Race  Unknown

 

 Ethnic Group  Unknown





Author







 Author  [facility]

 

 Organization  [facility]

 

 Address  Unknown

 

 Phone  Unavailable



              



Allergies

      





 Active            Description            Code            Type            
Severity            Reaction            Onset            Reported/Identified   
         Relationship to Patient            Clinical Status        

 

 Yes            kiwi            [id number]            Drug Allergy            
Unknown            N/A                         2014                      
            



                  



Medications

      



There is no data.                  



Problems

      





 Date Dx Coded            Attending            Type            Code            
Diagnosis            Diagnosed By        

 

 2010                         Ot            826.0                        
          

 

 2010                         Ot            959.7                        
          

 

 2010                         Ot            E000.8                       
           

 

 2010                         Ot            E849.0                       
           

 

 2010                         Ot            E917.9                       
           

 

 2011                         Ot            276.51                       
           

 

 2011                         Ot            493.00                       
           

 

 2011                         Ot            536.2                        
          

 

 2011                         Ot            564.1            IRRITABLE 
BOWEL SYNDROME                     

 

 2011                         Ot            789.01            ABDOMINAL 
PAIN, RIGHT UPPER QUADRANT                     

 

 2014            LALA MCCOY            Ot            599.0    
        URIN TRACT INFECTION NOS                     

 

 2014            LALA MCCOY            Ot            620.2    
        OVARIAN CYST NEC/NOS                     

 

 2014            LALA MCCOY            Ot            623.8    
        NONINFLAM DIS VAGINA NEC                     

 

 2014            LALA MCCOY            Ot            789.09   
         ABDOMINAL PAIN, OTHER SPECIFIED SITE                     

 

 2014            LALA MCCOY            Ot            V69.2    
        HIGH-RISK SEXUAL BEHAVIOR                     

 

 2015                         Ot            611.72                       
           

 

 2015                         Ot            611.72                       
           

 

 2015            RACH LARA APRN            Ot            599.0      
      URIN TRACT INFECTION NOS                     

 

 2015            RACH LARA APRVALERIE            Ot            681.00     
       CELLULITIS, FINGER NOS                     

 

 2015            RACH LARA APRVALERIE            Ot            883.0      
      OPEN WOUND OF FINGER                     

 

 2015            RACH LARA APRVALERIE            Ot            883.1      
      OPEN WOUND FINGER-COMPL                     

 

 2015            RACH LARA APRN            Ot            E000.8     
       OTHER EXTERNAL CAUSE STATUS                     

 

 2015            LARA, PETER J APRN            Ot            E849.0     
       ACCIDENT IN HOME                     

 

 2015            RACH LARA            Ot            E920.8     
       ACC-CUTTING INSTRUM NEC                     

 

 2017                         Ot            611.72            LUMP OR 
MASS IN BREAST                     

 

 2017            VAL TRAMMELL, NORMAN RANGEL Ot            
O32.1XX0            MATERNAL CARE FOR BREECH PRESENTATION, U                   
  

 

 2017            NORMAN NEAL MD, Ot            
O41.03X0            OLIGOHYDRAMNIOS, THIRD TRIMESTER, NOT AP                   
  

 

 2017            NORMAN NEAL MD, Ot            
O99.824            STREPTOCOCCUS B CARRIER STATE COMPLICATI                     

 

 2017            NORMAN NEAL MD            Ot            Z23  
          ENCOUNTER FOR IMMUNIZATION                     

 

 2017            NORMAN NEAL MD, Ot            Z37.0
            SINGLE LIVE BIRTH                     

 

 2017            NORMAN NEAL MD, Ot            
Z3A.38            38 WEEKS GESTATION OF PREGNANCY                     

 

 2018            RACH LARA            Ot            N83.291    
        OTHER OVARIAN CYST, RIGHT SIDE                     

 

 2018            RACH LARA            Ot            O20.0      
      THREATENED                      

 

 2018            RACH LARA            Ot            O34.80     
       MATERNAL CARE FOR OTH ABNLT OF PELVIC OR                     

 

 2018            RACH LRAA            Ot            O99.89     
       OTH DISEASES AND CONDITIONS COMPL PREG/C                     

 

 2018            RACH LARA            Ot            Z3A.00     
       WEEKS OF GESTATION OF PREGNANCY NOT SPEC                     

 

 2018            RACH LARA            Ot            N83.291    
        OTHER OVARIAN CYST, RIGHT SIDE                     

 

 2018            RACH ALRA            Ot            O20.0      
      THREATENED                      

 

 2018            RACH LARA            Ot            O34.80     
       MATERNAL CARE FOR OTH ABNLT OF PELVIC OR                     

 

 2018            RACH LARA            Ot            O99.89     
       OTH DISEASES AND CONDITIONS COMPL PREG/C                     

 

 2018            RACH LARA            Ot            Z3A.00     
       WEEKS OF GESTATION OF PREGNANCY NOT SPEC                     

 

 2018            ANGELA SHEARERP            Ot            O20.0            
THREATENED                      

 

 2018            ANGELA SHEARERP            Ot            O20.9            
HEMORRHAGE IN EARLY PREGNANCY, UNSPECIFI                     

 

 2018            MANFRED, ANGELA ARNP            Ot            Z3A.01          
  LESS THAN 8 WEEKS GESTATION OF PREGNANCY                     

 

 2018            ANGELA SHEARER            Ot            Z87.59          
  PERSONAL HISTORY OF COMP OF PREG, CHLDBR                     

 

 2018            ANGELA SHEARER            Ot            Z90.89          
  ACQUIRED ABSENCE OF OTHER ORGANS                     

 

 2018            TC ALEMAN DO            Ot            R10.2     
       PELVIC AND PERINEAL PAIN                     

 

 2018            ZHANGECH TC SCHROEDER S            Ot            Z3A.01    
        LESS THAN 8 WEEKS GESTATION OF PREGNANCY                     

 

 2018                         Ot            O03.9            COMPLETE OR 
UNSP SPONTANEOUS  WI                     

 

 2018                         Ot            O99.89            OTH 
DISEASES AND CONDITIONS COMPL PREG/C                     

 

 2018                         Ot            Z3A.11            11 WEEKS 
GESTATION OF PREGNANCY                     

 

 2018                         Ot            Z90.89            ACQUIRED 
ABSENCE OF OTHER ORGANS                     

 

 2018                         Ot            Z98.890            OTHER 
SPECIFIED POSTPROCEDURAL STATES                     

 

 2018                         Ot            O03.4            INCOMPLETE 
SPONTANEOUS  WITHOUT                      

 

 2018                         Ot            Z87.59            PERSONAL 
HISTORY OF COMP OF PREG, CHLDBR                     

 

 2018                         Ot            Z90.89            ACQUIRED 
ABSENCE OF OTHER ORGANS                     

 

 2018                         Ot            O03.9            COMPLETE OR 
UNSP SPONTANEOUS  WI                     

 

 2018                         Ot            O99.89            OTH 
DISEASES AND CONDITIONS COMPL PREG/C                     

 

 2018                         Ot            Z3A.11            11 WEEKS 
GESTATION OF PREGNANCY                     

 

 2018                         Ot            Z90.89            ACQUIRED 
ABSENCE OF OTHER ORGANS                     

 

 2018                         Ot            Z98.890            OTHER 
SPECIFIED POSTPROCEDURAL STATES                     

 

 2018                         Ot            O03.4            INCOMPLETE 
SPONTANEOUS  WITHOUT                      

 

 2018                         Ot            Z87.59            PERSONAL 
HISTORY OF COMP OF PREG, CHLDBR                     

 

 2018                         Ot            Z90.89            ACQUIRED 
ABSENCE OF OTHER ORGANS                     

 

 2018            ZHANGECH TC SCHROEDER S            Ot            R10.2     
       PELVIC AND PERINEAL PAIN                     

 

 2018            ZHANGECH TC SCHROEDER S            Ot            Z3A.01    
        LESS THAN 8 WEEKS GESTATION OF PREGNANCY                     

 

 2018            RACH LARA            Ot            S81.811A   
         LACERATION W/O FOREIGN BODY, RIGHT LOWER                     

 

 2018            RACH LARA            Ot            W19.XXXA   
         UNSPECIFIED FALL, INITIAL ENCOUNTER                     

 

 2018            RACH LARA            Ot            Z90.89     
       ACQUIRED ABSENCE OF OTHER ORGANS                     

 

 2018            RACH LARA            Ot            Z98.890    
        OTHER SPECIFIED POSTPROCEDURAL STATES                     

 

 2018            ZHANGECH TC SCHROEDER            Ot            R10.2     
       PELVIC AND PERINEAL PAIN                     

 

 2018            TC ALEMAN DO            Ot            Z3A.01    
        LESS THAN 8 WEEKS GESTATION OF PREGNANCY                     

 

 2018            RACH LARA            Ot            S81.811A   
         LACERATION W/O FOREIGN BODY, RIGHT LOWER                     

 

 2018            RACH LARA            Ot            W19.XXXA   
         UNSPECIFIED FALL, INITIAL ENCOUNTER                     

 

 2018            RACH LARA            Ot            Z90.89     
       ACQUIRED ABSENCE OF OTHER ORGANS                     

 

 2018            RACH LARA            Ot            Z98.890    
        OTHER SPECIFIED POSTPROCEDURAL STATES                     



                                                                               
                                                                               
          



Procedures

      





 Code            Description            Performed By            Performed On   
     

 

             [id number]                                  EXTRACTION OF POC, LOW 
CERVICAL, OPEN AP                                   2017        



                  



Results

      





 Test            Result            Range        









 Complete blood count (CBC) with automated white blood cell (WBC) differential 
- 17 09:35         









 Blood leukocytes automated count (number/volume)            10.3 10*3/uL      
      4.3-11.0        

 

 Blood erythrocytes automated count (number/volume)            4.18 10*6/uL    
        4.35-5.85        

 

 Venous blood hemoglobin measurement (mass/volume)            12.5 g/dL        
    11.5-16.0        

 

 Blood hematocrit (volume fraction)            36 %            35-52        

 

 Automated erythrocyte mean corpuscular volume            86 [foz_us]          
  80-99        

 

 Automated erythrocyte mean corpuscular hemoglobin (mass per erythrocyte)      
      30 pg            25-34        

 

 Automated erythrocyte mean corpuscular hemoglobin concentration measurement (
mass/volume)            35 g/dL            32-36        

 

 Automated erythrocyte distribution width ratio            12.8 %            
10.0-14.5        

 

 Automated blood platelet count (count/volume)            179 10*3/uL          
  130-400        

 

 Automated blood platelet mean volume measurement            11.1 [foz_us]     
       7.4-10.4        

 

 Automated blood neutrophils/100 leukocytes            78 %            42-75   
     

 

 Automated blood lymphocytes/100 leukocytes            15 %            12-44   
     

 

 Blood monocytes/100 leukocytes            7 %            0-12        

 

 Automated blood eosinophils/100 leukocytes            1 %            0-10     
   

 

 Automated blood basophils/100 leukocytes            0 %            0-10        

 

 Blood neutrophils automated count (number/volume)            8.0 10*3         
   1.8-7.8        

 

 Blood lymphocytes automated count (number/volume)            1.5 10*3         
   1.0-4.0        

 

 Blood monocytes automated count (number/volume)            0.7 10*3            
0.0-1.0        

 

 Automated eosinophil count            0.1 10*3/uL            0.0-0.3        

 

 Automated blood basophil count (count/volume)            0.0 10*3/uL          
  0.0-0.1        









 Blood type T Indirect antibody screen panel - 17 09:35         









 ABO+Rh group            BP             NRG        

 

 Transfusion band number            [id number]             NRG        

 

 Blood group antibody screen            NEGATIVE             NRG        









 Complete blood count (CBC) with automated white blood cell (WBC) differential 
- 18 10:49         









 Blood leukocytes automated count (number/volume)            6.3 10*3/uL       
     4.3-11.0        

 

 Blood erythrocytes automated count (number/volume)            4.96 10*6/uL    
        4.35-5.85        

 

 Venous blood hemoglobin measurement (mass/volume)            15.2 g/dL        
    11.5-16.0        

 

 Blood hematocrit (volume fraction)            43 %            35-52        

 

 Automated erythrocyte mean corpuscular volume            87 [foz_us]          
  80-99        

 

 Automated erythrocyte mean corpuscular hemoglobin (mass per erythrocyte)      
      31 pg            25-34        

 

 Automated erythrocyte mean corpuscular hemoglobin concentration measurement (
mass/volume)            35 g/dL            32-36        

 

 Automated erythrocyte distribution width ratio            12.5 %            
10.0-14.5        

 

 Automated blood platelet count (count/volume)            271 10*3/uL          
  130-400        

 

 Automated blood platelet mean volume measurement            10.8 [foz_us]     
       7.4-10.4        

 

 Automated blood neutrophils/100 leukocytes            67 %            42-75   
     

 

 Automated blood lymphocytes/100 leukocytes            22 %            12-44   
     

 

 Blood monocytes/100 leukocytes            9 %            0-12        

 

 Automated blood eosinophils/100 leukocytes            3 %            0-10     
   

 

 Automated blood basophils/100 leukocytes            0 %            0-10        

 

 Blood neutrophils automated count (number/volume)            4.3 10*3         
   1.8-7.8        

 

 Blood lymphocytes automated count (number/volume)            1.4 10*3         
   1.0-4.0        

 

 Blood monocytes automated count (number/volume)            0.5 10*3            
0.0-1.0        

 

 Automated eosinophil count            0.2 10*3/uL            0.0-0.3        

 

 Automated blood basophil count (count/volume)            0.0 10*3/uL          
  0.0-0.1        









 Comprehensive metabolic panel - 18 10:49         









 Serum or plasma sodium measurement (moles/volume)            140 mmol/L       
     135-145        

 

 Serum or plasma potassium measurement (moles/volume)            3.5 mmol/L    
        3.6-5.0        

 

 Serum or plasma chloride measurement (moles/volume)            106 mmol/L     
               

 

 Carbon dioxide            24 mmol/L            21-32        

 

 Serum or plasma anion gap determination (moles/volume)            10 mmol/L   
         5-14        

 

 Serum or plasma urea nitrogen measurement (mass/volume)            6 mg/dL    
        7-18        

 

 Serum or plasma creatinine measurement (mass/volume)            0.68 mg/dL    
        0.60-1.30        

 

 Serum or plasma urea nitrogen/creatinine mass ratio            9             
NRG        

 

 Serum or plasma creatinine measurement with calculation of estimated 
glomerular filtration rate            >             NRG        

 

 Serum or plasma glucose measurement (mass/volume)            94 mg/dL         
           

 

 Serum or plasma calcium measurement (mass/volume)            9.9 mg/dL        
    8.5-10.1        

 

 Serum or plasma total bilirubin measurement (mass/volume)            1.0 mg/dL
            0.1-1.0        

 

 Serum or plasma alkaline phosphatase measurement (enzymatic activity/volume)  
          51 U/L                    

 

 Serum or plasma aspartate aminotransferase measurement (enzymatic activity/
volume)            16 U/L            5-34        

 

 Serum or plasma alanine aminotransferase measurement (enzymatic activity/volume
)            12 U/L            0-55        

 

 Serum or plasma protein measurement (mass/volume)            7.5 g/dL         
   6.4-8.2        

 

 Serum or plasma albumin measurement (mass/volume)            5.0 g/dL         
   3.2-4.5        









 Serum or plasma choriogonadotropin measurement (units/volume) - 18 10:49
         









 Serum or plasma choriogonadotropin measurement (units/volume)            1076 m
[iU]/mL            <5        









 Complete urinalysis with reflex to culture - 18 10:55         









 Urine color determination            YELLOW             NRG        

 

 Urine clarity determination            CLEAR             NRG        

 

 Urine pH measurement by test strip            7             5-9        

 

 Specific gravity of urine by test strip            1.010             1.016-
1.022        

 

 Urine protein assay by test strip, semi-quantitative            NEGATIVE      
       NEGATIVE        

 

 Urine glucose detection by automated test strip            NEGATIVE           
  NEGATIVE        

 

 Erythrocytes detection in urine sediment by light microscopy            
NEGATIVE             NEGATIVE        

 

 Urine ketones detection by automated test strip            3+             
NEGATIVE        

 

 Urine nitrite detection by test strip            NEGATIVE             NEGATIVE
        

 

 Urine total bilirubin detection by test strip            NEGATIVE             
NEGATIVE        

 

 Urine urobilinogen measurement by automated test strip (mass/volume)          
  NORMAL             NORMAL        

 

 Urine leukocyte esterase detection by dipstick            1+             
NEGATIVE        

 

 Automated urine sediment erythrocyte count by microscopy (number/high power 
field)            NONE             NRG        

 

 Automated urine sediment leukocyte count by microscopy (number/high power field
)            RARE             NRG        

 

 Bacteria detection in urine sediment by light microscopy            NEGATIVE  
           NRG        

 

 Squamous epithelial cells detection in urine sediment by light microscopy     
       0-2             NRG        

 

 Crystals detection in urine sediment by light microscopy            NONE      
       NRG        

 

 Casts detection in urine sediment by light microscopy            NONE         
    NRG        

 

 Mucus detection in urine sediment by light microscopy            NEGATIVE     
        NRG        

 

 Complete urinalysis with reflex to culture            NO             NRG      
  









 Complete blood count (CBC) with automated white blood cell (WBC) differential 
- 18 17:05         









 Blood leukocytes automated count (number/volume)            6.1 10*3/uL       
     4.3-11.0        

 

 Blood erythrocytes automated count (number/volume)            4.53 10*6/uL    
        4.35-5.85        

 

 Venous blood hemoglobin measurement (mass/volume)            13.8 g/dL        
    11.5-16.0        

 

 Blood hematocrit (volume fraction)            39 %            35-52        

 

 Automated erythrocyte mean corpuscular volume            87 [foz_us]          
  80-99        

 

 Automated erythrocyte mean corpuscular hemoglobin (mass per erythrocyte)      
      31 pg            25-34        

 

 Automated erythrocyte mean corpuscular hemoglobin concentration measurement (
mass/volume)            35 g/dL            32-36        

 

 Automated erythrocyte distribution width ratio            12.5 %            
10.0-14.5        

 

 Automated blood platelet count (count/volume)            269 10*3/uL          
  130-400        

 

 Automated blood platelet mean volume measurement            10.8 [foz_us]     
       7.4-10.4        

 

 Automated blood neutrophils/100 leukocytes            54 %            42-75   
     

 

 Automated blood lymphocytes/100 leukocytes            32 %            12-44   
     

 

 Blood monocytes/100 leukocytes            11 %            0-12        

 

 Automated blood eosinophils/100 leukocytes            4 %            0-10     
   

 

 Automated blood basophils/100 leukocytes            1 %            0-10        

 

 Blood neutrophils automated count (number/volume)            3.3 10*3         
   1.8-7.8        

 

 Blood lymphocytes automated count (number/volume)            1.9 10*3         
   1.0-4.0        

 

 Blood monocytes automated count (number/volume)            0.6 10*3            
0.0-1.0        

 

 Automated eosinophil count            0.2 10*3/uL            0.0-0.3        

 

 Automated blood basophil count (count/volume)            0.0 10*3/uL          
  0.0-0.1        









 Serum or plasma choriogonadotropin (pregnancy test) detection - 18 17:05
         









 Serum or plasma choriogonadotropin (pregnancy test) detection            
POSITIVE             NEGATIVE        









 Comprehensive metabolic panel - 18 17:05         









 Serum or plasma sodium measurement (moles/volume)            141 mmol/L       
     135-145        

 

 Serum or plasma potassium measurement (moles/volume)            3.3 mmol/L    
        3.6-5.0        

 

 Serum or plasma chloride measurement (moles/volume)            107 mmol/L     
               

 

 Carbon dioxide            25 mmol/L            21-32        

 

 Serum or plasma anion gap determination (moles/volume)            9 mmol/L    
        5-14        

 

 Serum or plasma urea nitrogen measurement (mass/volume)            8 mg/dL    
        7-18        

 

 Serum or plasma creatinine measurement (mass/volume)            0.73 mg/dL    
        0.60-1.30        

 

 Serum or plasma urea nitrogen/creatinine mass ratio            11             
NRG        

 

 Serum or plasma creatinine measurement with calculation of estimated 
glomerular filtration rate            >             NRG        

 

 Serum or plasma glucose measurement (mass/volume)            124 mg/dL        
            

 

 Serum or plasma calcium measurement (mass/volume)            9.5 mg/dL        
    8.5-10.1        

 

 Serum or plasma total bilirubin measurement (mass/volume)            0.7 mg/dL
            0.1-1.0        

 

 Serum or plasma alkaline phosphatase measurement (enzymatic activity/volume)  
          50 U/L                    

 

 Serum or plasma aspartate aminotransferase measurement (enzymatic activity/
volume)            12 U/L            5-34        

 

 Serum or plasma alanine aminotransferase measurement (enzymatic activity/volume
)            9 U/L            0-55        

 

 Serum or plasma protein measurement (mass/volume)            7.0 g/dL         
   6.4-8.2        

 

 Serum or plasma albumin measurement (mass/volume)            4.8 g/dL         
   3.2-4.5        









 Serum or plasma choriogonadotropin measurement (units/volume) - 18 17:05
         









 Serum or plasma choriogonadotropin measurement (units/volume)            3410 m
[iU]/mL            <5        









 CULTURE, GENITAL - 18 09:55         









 CULTURE, GENITAL            SEE NOTE             NRG        









 GC/CHLAMYDIA (SWAB OR URINE)-RAPID - 18 09:55         









 CHLAMYDIA TRACHOMATIS RNA, TMA            NOT DETECTED             NOT 
DETECTED        

 

 NEISSERIA GONORRHOEAE RNA, TMA            NOT DETECTED             NOT 
DETECTED        

 

 COMMENT                         NRG        









 SUREPATH PAP RFX HPV mRNA E6/E7 - 18 09:55         









 CLINICAL INFORMATION:            Pregnant             NRG        

 

 LMP:            18             NRG        

 

 PREV. PAP:            WNL             NRG        

 

 PREV. BX:            NONE             NRG        

 

 SOURCE:            Endocervix             NRG        

 

 STATEMENT OF ADEQUACY:                         NRG        

 

 INTERPRETATION/RESULT:                         NRG        

 

 CYTOTECHNOLOGIST:                         NRG        

 

 COMMENT                         NRG        









 UBALDO - 18 13:52         









 NUMBER OF FETUSES?            1             NRG        

 

 ADVANCED MATERNAL AGE?            NO             NRG        

 

 ABNORMAL SABINE?            NO             NRG        

 

 ABNORMAL US?            NO             NRG        

 

 PERSONAL/FAM HISTORY?            NO             NRG        

 

 INTERPRETATION            SEE NOTE             NRG        

 

 TRISOMY 21 (T21)            Negative             NRG        

 

 TRISOMY 18 (T18)            Negative             NRG        

 

 TRISOMY 13 (T13)            Negative             NRG        

 

 Y CHROMOSOME            Detected             NRG        

 

 Y CHR. INTERPRETATION            SEE NOTE             NRG        

 

 SEX CHROMOSOME            No aneuploidy             NRG        

 

 SEX CHROMOSOME INTERP            SEE NOTE             NRG        

 

 MICRODELETION            Not detected             NRG        

 

 MICRODELETION INTERP            SEE NOTE             NRG        

 

 GESTATIONAL AGE(IN WEEKS)            10             NRG        

 

 GESTATIONAL AGE (IN DAYS)            1             NRG        

 

 FETAL FRACTION            6.69%             NRG        

 

 LABORATORY COMMENTS            SEE NOTE             NRG        

 

 LIMITATIONS            SEE NOTE             NRG        

 

 SPECIFICATIONS            SEE NOTE             NRG        

 

 METHODOLOGY            SEE NOTE             NRG        



                                          



Encounters

      





 ACCT No.            Visit Date/Time            Discharge            Status    
        Pt. Type            Provider            Facility            Loc./Unit  
          Complaint        

 

 [id number]            2018 17:53:00            2018 19:23:00    
        DIS            Emergency            DELORESELÍASIS            [address] 
[facility]            ER            HEALING GASH RIGHT LEG, 
SWELLING        

 

 [id number]            2018 12:41:00            2018 23:59:59    
        CLS            Emergency            RACH LARA            [address] 
[facility]            ER            FALL/R LEG LAC        

 

 [id number]            2018 16:46:00            2018 18:45:00    
        DIS            Emergency            ANGELA SHEARER            [address] 
[facility]            ER            5 WEEKS PREGNANT AND 
STARTED BLEEDING        

 

 [id number]            2018 06:18:00            2018 23:59:59    
        CLS            Outpatient            TC ALEMAN DO            
[facility]            LAB            LOW HCG LEVELS      
  

 

 [id number]            2018 10:23:00            2018 12:15:00    
        DIS            Emergency            RACH LARA            [address] 
[facility]            ER            POSSIBLY PG,ABD CRAMPING 
       

 

 [id number]            2018 14:34:00            2018 23:59:59    
        CLS            Preadmit            THEA COOK DO            
[facility]            REHAB            POSTPARTUM BACK 
PAIN        

 

 [id number]            2017 09:15:00            2017 13:35:00    
        DIS            Inpatient            NORMAN NEAL MD          
  [facility]            LDRP            BREECH 
PRESENTATION        

 

 [id number]            2015 18:14:00            2015 19:12:00    
        DIS            Emergency            RACH LARA            [address] 
[facility]            ER            LEFT HAND FINGER LAC;ABD 
PAIN        

 

 [id number]            2014 11:04:00            2014 13:14:00    
        DIS            Emergency            LALA MCCOY            
[facility]            ER            ABD PAIN        

 

 [id number]            2018 23:06:00                                   
   Document Registration                                                       
     

 

 [id number]            2018 15:18:00                                   
   Document Registration                                                       
     

 

 [id number]            2011 13:31:00                                   
   Document Registration                                                       
     

 

 [id number]            2011 13:25:00                                   
   Document Registration                                                       
     

 

 [id number]            2011 11:22:00                                   
   Document Registration                                                       
     

 

 [id number]            2011 00:50:00                                   
   Document Registration                                                       
     

 

 [id number]            2010 22:18:00                                   
   Document Registration                                                       
     

 

 [id number]            2015 02:01:34                         [place]            
Document Registration                                                          
  

 

 [id number]            2018 13:40:00            2018 23:59:59          
  [facility]            Outpatient            RAVI LAC, ASUNCION                       
  [facility]                     

 

 [id number]            2018 13:40:00                                      
Document Registration                                                          
  

 

 [id number]            2018 08:00:00                                      
Document Registration

## 2018-12-18 ENCOUNTER — HOSPITAL ENCOUNTER (EMERGENCY)
Dept: HOSPITAL 75 - ER | Age: 23
Discharge: HOME | End: 2018-12-18
Payer: MEDICAID

## 2018-12-18 VITALS — HEIGHT: 62 IN | BODY MASS INDEX: 17.48 KG/M2 | WEIGHT: 95 LBS

## 2018-12-18 VITALS — SYSTOLIC BLOOD PRESSURE: 111 MMHG | DIASTOLIC BLOOD PRESSURE: 82 MMHG

## 2018-12-18 DIAGNOSIS — Z90.89: ICD-10-CM

## 2018-12-18 DIAGNOSIS — Z98.890: ICD-10-CM

## 2018-12-18 DIAGNOSIS — N93.9: Primary | ICD-10-CM

## 2018-12-18 DIAGNOSIS — Z87.448: ICD-10-CM

## 2018-12-18 LAB
ALBUMIN SERPL-MCNC: 5.1 GM/DL (ref 3.2–4.5)
ALP SERPL-CCNC: 54 U/L (ref 40–136)
ALT SERPL-CCNC: 16 U/L (ref 0–55)
APTT PPP: YELLOW S
BACTERIA #/AREA URNS HPF: (no result) /HPF
BASOPHILS # BLD AUTO: 0 10^3/UL (ref 0–0.1)
BASOPHILS NFR BLD AUTO: 1 % (ref 0–10)
BILIRUB SERPL-MCNC: 0.4 MG/DL (ref 0.1–1)
BILIRUB UR QL STRIP: NEGATIVE
BUN/CREAT SERPL: 10
CALCIUM SERPL-MCNC: 10.4 MG/DL (ref 8.5–10.1)
CHLORIDE SERPL-SCNC: 102 MMOL/L (ref 98–107)
CO2 SERPL-SCNC: 31 MMOL/L (ref 21–32)
CREAT SERPL-MCNC: 0.69 MG/DL (ref 0.6–1.3)
EOSINOPHIL # BLD AUTO: 0.2 10^3/UL (ref 0–0.3)
EOSINOPHIL NFR BLD AUTO: 3 % (ref 0–10)
ERYTHROCYTE [DISTWIDTH] IN BLOOD BY AUTOMATED COUNT: 12.8 % (ref 10–14.5)
FIBRINOGEN PPP-MCNC: (no result) MG/DL
GFR SERPLBLD BASED ON 1.73 SQ M-ARVRAT: > 60 ML/MIN
GLUCOSE SERPL-MCNC: 70 MG/DL (ref 70–105)
GLUCOSE UR STRIP-MCNC: NEGATIVE MG/DL
HCT VFR BLD CALC: 45 % (ref 35–52)
HGB BLD-MCNC: 15.6 G/DL (ref 11.5–16)
KETONES UR QL STRIP: NEGATIVE
LEUKOCYTE ESTERASE UR QL STRIP: (no result)
LYMPHOCYTES # BLD AUTO: 1.8 X 10^3 (ref 1–4)
LYMPHOCYTES NFR BLD AUTO: 32 % (ref 12–44)
MANUAL DIFFERENTIAL PERFORMED BLD QL: NO
MCH RBC QN AUTO: 30 PG (ref 25–34)
MCHC RBC AUTO-ENTMCNC: 35 G/DL (ref 32–36)
MCV RBC AUTO: 87 FL (ref 80–99)
MONOCYTES # BLD AUTO: 0.5 X 10^3 (ref 0–1)
MONOCYTES NFR BLD AUTO: 9 % (ref 0–12)
NEUTROPHILS # BLD AUTO: 3.2 X 10^3 (ref 1.8–7.8)
NEUTROPHILS NFR BLD AUTO: 55 % (ref 42–75)
NITRITE UR QL STRIP: NEGATIVE
PH UR STRIP: 7 [PH] (ref 5–9)
PLATELET # BLD: 259 10^3/UL (ref 130–400)
PMV BLD AUTO: 11.7 FL (ref 7.4–10.4)
POTASSIUM SERPL-SCNC: 3.4 MMOL/L (ref 3.6–5)
PROT SERPL-MCNC: 8.3 GM/DL (ref 6.4–8.2)
PROT UR QL STRIP: (no result)
RBC # BLD AUTO: 5.18 10^6/UL (ref 4.35–5.85)
RBC #/AREA URNS HPF: (no result) /HPF
SODIUM SERPL-SCNC: 142 MMOL/L (ref 135–145)
SP GR UR STRIP: 1.01 (ref 1.02–1.02)
UROBILINOGEN UR-MCNC: NORMAL MG/DL
WBC # BLD AUTO: 5.8 10^3/UL (ref 4.3–11)
WBC #/AREA URNS HPF: (no result) /HPF

## 2018-12-18 PROCEDURE — 81000 URINALYSIS NONAUTO W/SCOPE: CPT

## 2018-12-18 PROCEDURE — 87591 N.GONORRHOEAE DNA AMP PROB: CPT

## 2018-12-18 PROCEDURE — 85025 COMPLETE CBC W/AUTO DIFF WBC: CPT

## 2018-12-18 PROCEDURE — 87491 CHLMYD TRACH DNA AMP PROBE: CPT

## 2018-12-18 PROCEDURE — 80053 COMPREHEN METABOLIC PANEL: CPT

## 2018-12-18 PROCEDURE — 84443 ASSAY THYROID STIM HORMONE: CPT

## 2018-12-18 PROCEDURE — 84703 CHORIONIC GONADOTROPIN ASSAY: CPT

## 2018-12-18 PROCEDURE — 86141 C-REACTIVE PROTEIN HS: CPT

## 2018-12-18 PROCEDURE — 36415 COLL VENOUS BLD VENIPUNCTURE: CPT

## 2018-12-18 PROCEDURE — 87210 SMEAR WET MOUNT SALINE/INK: CPT

## 2018-12-18 NOTE — ED GU-FEMALE
General


Chief Complaint:  -Female


Stated Complaint:  R OVARY PAIN;IRREGULAR BLEEDING


Nursing Triage Note:  


pt presents to ed with complaints of back ache, heavy menses, reports going 


through a pad an hour. Pt reports she has hx of ovarian cyst. Pt also reports 


family history of ovarian ca and reports increase in fatigue and weightloss.


Nursing Sepsis Screen:  No Definite Risk


Source:  patient, other (boyfriend)


Exam Limitations:  no limitations





History of Present Illness


Date Seen by Provider:  Dec 18, 2018


Time Seen by Provider:  16:04


Initial Comments


Patient presents to the ER by private conveyance with her significant other and 

chief complaint that she started her period him on , 13 days ago 

which lasted the normal 7 days. Her periods are normally regular last 7 days 

and every month. She had no significant cramping nausea or vomiting with this.. 

Then 4 days after it quit she started having a period again. Her period is 

gotten more frequent now going through a pad an hour and she's having 

progressively worsening right lower pelvic pain. She knows she had an 

ultrasound during her last pregnancy a year and a half ago that demonstrated 

ovarian cysts on the right. She equates this pain today with ovarian cyst pain. 

She denies any dysuria, discharge or malodorous discharge. She says her last 

sexual intercourse was over a week ago. She denies ever having sexual 

transmitted infection and was tested most recently at her pregnancy 

approximately 2 years ago. The only surgery she's had on her abdomen was a C-

section. She denies any trauma to her abdomen. She is known to Dr. Coe but she 

has not followed up with Dr. Coe for the symptoms yet. She fell down some 

steps earlier and got put in a knee immobilizer and put on hydrocodone which 

she uses couple times a day as needed. She had a bowel movement today which was 

a small amount of loose stool only. 2 months ago she was started on a medicine 

that starts with C to help with her appetite for her anorexia that she stopped 

a month ago because it was not helping. She is on birth control pills. She 

states she went from her normal weight after her pregnancy about a year and a 

half ago of 120 down to 94 pounds today. She is followed by nurse practitioner 

at [facility].





Allergies and Home Medications


Allergies


Coded Allergies:  


     kiwi (Unverified  Allergy, Unknown, 11/7/14)





Home Medications


Hydrocodone Bit/Acetaminophen 1 Tab Tab, 1 EACH PO Q4-6HR PRN for PAIN-MODERATE


   Prescribed by: DOROTHY ESTRELLA on 18 1907





Patient Home Medication List


Home Medication List Reviewed:  Yes





Review of Systems


Review of Systems


Constitutional:  chills, diaphoresis; No fever, No malaise


EENTM:  No ear discharge, No ear pain


Respiratory:  No cough, No hemoptysis, No phlegm, No short of breath


Cardiovascular:  No chest pain, No palpitations, No syncope


Gastrointestinal:  abdominal pain (right pelvic pain), constipation, diarrhea, 

nausea (earlier today)


Genitourinary:  denies burning, denies discharge





Past Medical-Social-Family Hx


Patient Social History


Alcohol Use:  Denies Use


Recreational Drug Use:  No


Smoking Status:  Never a Smoker


2nd Hand Smoke Exposure:  No


Recent Foreign Travel:  No


Contact w/Someone Who Travel:  No


Recent Infectious Disease Expo:  No


Recent Hopitalizations:  No


Physical Abuse:  No


Sexual Abuse:  No


Mistreated:  No


Fear:  No





Immunizations Up To Date


PED Vaccines UTD:  No





Seasonal Allergies


Seasonal Allergies:  Yes





Past Medical History


Surgeries:  Yes (wisdom teeth)


Adenoidectomy,  Section, Tonsillectomy


Respiratory:  No


Cardiac:  No


Neurological:  No


Reproductive Disorders:  No


Female Reproductive Disorders:  Ovarian Cyst


Sexually Transmitted Disease:  No


HIV/AIDS:  No


Genitourinary:  No


Gastrointestinal:  No


Musculoskeletal:  No


Endocrine:  No


HEENT:  No


Cancer:  No


Psychosocial:  No


Integumentary:  No


Blood Disorders:  No


Adverse Reaction/Blood Tranf:  No





Family Medical History





Not obtainable due to adoption





Physical Exam


Vital Signs





Vital Signs - First Documented








 18





 14:34


 


Temp 99.6


 


Pulse 96


 


Resp 20


 


B/P (MAP) 138/79 (98)


 


Pulse Ox 98





Capillary Refill : Less Than 3 Seconds


Height, Weight, BMI


Height: 5'2.00"


Weight: 95lbs. 0.0oz. [id number]; 26.9 BMI


Method:Stated


General Appearance:  no apparent distress, thin


HEENT:  PERRL/EOMI, pharynx normal


Neck:  non-tender, normal inspection


Cardiovascular:  normal peripheral pulses, regular rate, rhythm


Respiratory:  chest non-tender, lungs clear, normal breath sounds, no 

respiratory distress, no accessory muscle use


Gastrointestinal:  normal bowel sounds, soft, no organomegaly; No guarding; 

tenderness (right lower pelvis); No mass


Genital/Rectal:  normal genital exam, other (thin bloody secretions in the 

vaginal vault. Normal, closed parous cervix )





Progress/Results/Core Measures


Suspected Sepsis


Recent Fever Within 48 Hours:  No


Infection Criteria Present:  None


New/Unexplained  Altered Menta:  No


Sepsis Screen:  No Definite Risk


SIRS


Temperature:99.6 


Pulse: 96 


Respiratory Rate: 20


 


Laboratory Tests


18 15:47: White Blood Count 5.8


Blood Pressure 138 /79 


Mean: 98


 





Laboratory Tests


18 15:47: 


Creatinine 0.69, Platelet Count 259, Total Bilirubin 0.4








Results/Orders


Lab Results





Laboratory Tests








Test


 18


15:47 18


15:50 18


17:15 Range/Units


 


 


White Blood Count


 5.8 


 


 


 4.3-11.0


10^3/uL


 


Red Blood Count


 5.18 


 


 


 4.35-5.85


10^6/uL


 


Hemoglobin 15.6    11.5-16.0  G/DL


 


Hematocrit 45    35-52  %


 


Mean Corpuscular Volume 87    80-99  FL


 


Mean Corpuscular Hemoglobin 30    25-34  PG


 


Mean Corpuscular Hemoglobin


Concent 35 


 


 


 32-36  G/DL





 


Red Cell Distribution Width 12.8    10.0-14.5  %


 


Platelet Count


 259 


 


 


 130-400


10^3/uL


 


Mean Platelet Volume 11.7 H   7.4-10.4  FL


 


Neutrophils (%) (Auto) 55    42-75  %


 


Lymphocytes (%) (Auto) 32    12-44  %


 


Monocytes (%) (Auto) 9    0-12  %


 


Eosinophils (%) (Auto) 3    0-10  %


 


Basophils (%) (Auto) 1    0-10  %


 


Neutrophils # (Auto) 3.2    1.8-7.8  X 10^3


 


Lymphocytes # (Auto) 1.8    1.0-4.0  X 10^3


 


Monocytes # (Auto) 0.5    0.0-1.0  X 10^3


 


Eosinophils # (Auto)


 0.2 


 


 


 0.0-0.3


10^3/uL


 


Basophils # (Auto)


 0.0 


 


 


 0.0-0.1


10^3/uL


 


Sodium Level 142    135-145  MMOL/L


 


Potassium Level 3.4 L   3.6-5.0  MMOL/L


 


Chloride Level 102      MMOL/L


 


Carbon Dioxide Level 31    21-32  MMOL/L


 


Anion Gap 9    5-14  MMOL/L


 


Blood Urea Nitrogen 7    7-18  MG/DL


 


Creatinine


 0.69 


 


 


 0.60-1.30


MG/DL


 


Estimat Glomerular Filtration


Rate > 60 


 


 


  





 


BUN/Creatinine Ratio 10     


 


Glucose Level 70      MG/DL


 


Calcium Level 10.4 H   8.5-10.1  MG/DL


 


Corrected Calcium     8.5-10.1  MG/DL


 


Total Bilirubin 0.4    0.1-1.0  MG/DL


 


Aspartate Amino Transf


(AST/SGOT) 17 


 


 


 5-34  U/L





 


Alanine Aminotransferase


(ALT/SGPT) 16 


 


 


 0-55  U/L





 


Alkaline Phosphatase 54      U/L


 


C-Reactive Protein High


Sensitivity 0.04 


 


 


 0.00-0.50


MG/DL


 


Total Protein 8.3 H   6.4-8.2  GM/DL


 


Albumin 5.1 H   3.2-4.5  GM/DL


 


Thyroid Stimulating Hormone


(TSH) 0.57 


 


 


 0.35-4.94


UIU/ML


 


Urine Color  YELLOW    


 


Urine Clarity  VERY CLOUDY H   


 


Urine pH  7   5-9  


 


Urine Specific Gravity  1.015 L  1.016-1.022  


 


Urine Protein  1+ H  NEGATIVE  


 


Urine Glucose (UA)  NEGATIVE   NEGATIVE  


 


Urine Ketones  NEGATIVE   NEGATIVE  


 


Urine Nitrite  NEGATIVE   NEGATIVE  


 


Urine Bilirubin  NEGATIVE   NEGATIVE  


 


Urine Urobilinogen  NORMAL   NORMAL  MG/DL


 


Urine Leukocyte Esterase  1+ H  NEGATIVE  


 


Urine RBC (Auto)  5+ H  NEGATIVE  


 


Urine RBC  TNTC H   /HPF


 


Urine WBC  0-2    /HPF


 


Urine Crystals  NONE    /LPF


 


Urine Bacteria  FEW H   /HPF


 


Urine Casts  NONE    /LPF


 


Urine Mucus  NEGATIVE    /LPF


 


Urine Culture Indicated  NO    








Micro Results





Microbiology


18 Wet Prep - Final, Complete


           





My Orders





Orders - MATT BYNUM


Ua Culture If Indicated (18 16:12)


Cbc With Automated Diff (18 16:12)


Comprehensive Metabolic Panel (18 16:12)


Hs C Reactive Protein (18 16:12)


Neisseria Gonorrhea Swab (18 16:12)


Chlamydia Trachomatis Swab (18 16:12)


Wet Prep (18 16:12)


Urine Pregnancy Bedside (18 16:12)


Thyroid Stimulating Hormone (18 16:12)


Ketorolac Injection (Toradol Injection) (18 16:15)





Medications Given in ED





Current Medications








 Medications  Dose


 Ordered  Sig/Arturo


 Route  Start Time


 Stop Time Status Last Admin


Dose Admin


 


 Ketorolac


 Tromethamine  15 mg  ONCE  ONCE


 IVP  18 16:15


 18 16:22 DC 18 17:23


15 MG








Vital Signs/I&O











 18





 14:34


 


Temp 99.6


 


Pulse 96


 


Resp 20


 


B/P (MAP) 138/79 (98)


 


Pulse Ox 98





Capillary Refill : Less Than 3 Seconds








Blood Pressure Mean:  98








Progress Note #1:  


   Time:  16:36


Progress Note


Bedside urine pregnancy is negative. Plan to do a pelvic exam and give her 

Toradol. Her pain does not improve we'll get an ultrasound of her pelvis. We'll 

obtain urine and blood. We will try and get a hold of the name of the 

medication she was recently on for her appetite from [facility] pharmacy.


Progress Note #2:  


   Time:  17:39


Progress Note


Ultrasound is not available since 4:00. We'll have her follow up outpatient 

with OB gynecology because her labs are unremarkable and she has aseptic vital 

signs. Her pain did improve with the ketorolac. We'll put her on Naprosyn 500 

twice a day as needed for pain as well as heat, Tylenol. She has plenty of 

Zofran left over from previous times.





Departure


Impression





 Primary Impression:  


 Abnormal uterine bleeding (AUB)


 Additional Impression:  


 Pelvic pain


Disposition:  01 HOME, SELF-CARE


Condition:  Improved





Departure-Patient Inst.


Decision time for Depature:  17:41


Referrals:  


NO,LOCAL PHYSICIAN (PCP)


Primary Care Physician








DIANNE COE DO


Patient Instructions:  IRREGULAR VAGINAL BLEEDING





Add. Discharge Instructions:  


Use the naproxen 1 tablet twice a day as needed for pain. You can also use 

Tylenol 1000 mg every 8 hours and you can use your other pain medicines, heat, 

rest, distraction and warm baths.


Call Dr. Coe tomorrow and request a follow-up appointment for your abnormal 

uterine bleeding.


All discharge instructions reviewed with patient and/or family. Voiced 

understanding.


Scripts


Naproxen (Naprosyn) 500 Mg Tablet


500 MG PO BID PRN for BREAKTHROUGH PAIN for 14 Days, #30 TAB 0 Refills


   Prov: MATT BYNUM         18


Work/School Note:  Work Release Form   Date Seen in the Emergency Department:  

Dec 18, 2018


   Return to Work:  Dec 20, 2018


   Restrictions:  No Restrictions





Copy


Copies To 1:   DIANNE COE TITUS J Dec 18, 2018 16:23

## 2019-10-06 ENCOUNTER — HOSPITAL ENCOUNTER (EMERGENCY)
Dept: HOSPITAL 75 - ER | Age: 24
Discharge: HOME | End: 2019-10-06
Payer: MEDICAID

## 2019-10-06 VITALS — WEIGHT: 94.8 LBS | HEIGHT: 61.81 IN | BODY MASS INDEX: 17.44 KG/M2

## 2019-10-06 VITALS — SYSTOLIC BLOOD PRESSURE: 117 MMHG | DIASTOLIC BLOOD PRESSURE: 70 MMHG

## 2019-10-06 DIAGNOSIS — N39.0: Primary | ICD-10-CM

## 2019-10-06 DIAGNOSIS — Z90.89: ICD-10-CM

## 2019-10-06 DIAGNOSIS — Z80.41: ICD-10-CM

## 2019-10-06 LAB
ALBUMIN SERPL-MCNC: 4.6 GM/DL (ref 3.2–4.5)
ALP SERPL-CCNC: 50 U/L (ref 40–136)
ALT SERPL-CCNC: 19 U/L (ref 0–55)
APTT PPP: YELLOW S
BACTERIA #/AREA URNS HPF: (no result) /HPF
BASOPHILS # BLD AUTO: 0 10^3/UL (ref 0–0.1)
BASOPHILS NFR BLD AUTO: 0 % (ref 0–10)
BILIRUB SERPL-MCNC: 0.6 MG/DL (ref 0.1–1)
BILIRUB UR QL STRIP: NEGATIVE
BUN/CREAT SERPL: 11
CALCIUM SERPL-MCNC: 9.8 MG/DL (ref 8.5–10.1)
CHLORIDE SERPL-SCNC: 106 MMOL/L (ref 98–107)
CO2 SERPL-SCNC: 26 MMOL/L (ref 21–32)
CREAT SERPL-MCNC: 0.72 MG/DL (ref 0.6–1.3)
EOSINOPHIL # BLD AUTO: 0.3 10^3/UL (ref 0–0.3)
EOSINOPHIL NFR BLD AUTO: 2 % (ref 0–10)
ERYTHROCYTE [DISTWIDTH] IN BLOOD BY AUTOMATED COUNT: 12.5 % (ref 10–14.5)
FIBRINOGEN PPP-MCNC: CLEAR MG/DL
GFR SERPLBLD BASED ON 1.73 SQ M-ARVRAT: > 60 ML/MIN
GLUCOSE SERPL-MCNC: 98 MG/DL (ref 70–105)
GLUCOSE UR STRIP-MCNC: NEGATIVE MG/DL
HCT VFR BLD CALC: 43 % (ref 35–52)
HGB BLD-MCNC: 15.3 G/DL (ref 11.5–16)
KETONES UR QL STRIP: (no result)
LEUKOCYTE ESTERASE UR QL STRIP: (no result)
LYMPHOCYTES # BLD AUTO: 1.6 X 10^3 (ref 1–4)
LYMPHOCYTES NFR BLD AUTO: 14 % (ref 12–44)
MANUAL DIFFERENTIAL PERFORMED BLD QL: NO
MCH RBC QN AUTO: 31 PG (ref 25–34)
MCHC RBC AUTO-ENTMCNC: 35 G/DL (ref 32–36)
MCV RBC AUTO: 87 FL (ref 80–99)
MONOCYTES # BLD AUTO: 0.7 X 10^3 (ref 0–1)
MONOCYTES NFR BLD AUTO: 6 % (ref 0–12)
NEUTROPHILS # BLD AUTO: 8.8 X 10^3 (ref 1.8–7.8)
NEUTROPHILS NFR BLD AUTO: 77 % (ref 42–75)
NITRITE UR QL STRIP: NEGATIVE
PH UR STRIP: 6 [PH] (ref 5–9)
PLATELET # BLD: 326 10^3/UL (ref 130–400)
PMV BLD AUTO: 10.7 FL (ref 7.4–10.4)
POTASSIUM SERPL-SCNC: 3.8 MMOL/L (ref 3.6–5)
PROT SERPL-MCNC: 7.2 GM/DL (ref 6.4–8.2)
PROT UR QL STRIP: (no result)
RBC #/AREA URNS HPF: (no result) /HPF
SODIUM SERPL-SCNC: 145 MMOL/L (ref 135–145)
SP GR UR STRIP: 1.02 (ref 1.02–1.02)
SQUAMOUS #/AREA URNS HPF: (no result) /HPF
UROBILINOGEN UR-MCNC: NORMAL MG/DL
WBC # BLD AUTO: 11.4 10^3/UL (ref 4.3–11)
WBC #/AREA URNS HPF: (no result) /HPF

## 2019-10-06 PROCEDURE — 80053 COMPREHEN METABOLIC PANEL: CPT

## 2019-10-06 PROCEDURE — 87088 URINE BACTERIA CULTURE: CPT

## 2019-10-06 PROCEDURE — 81000 URINALYSIS NONAUTO W/SCOPE: CPT

## 2019-10-06 PROCEDURE — 85025 COMPLETE CBC W/AUTO DIFF WBC: CPT

## 2019-10-06 PROCEDURE — 84703 CHORIONIC GONADOTROPIN ASSAY: CPT

## 2019-10-06 PROCEDURE — 36415 COLL VENOUS BLD VENIPUNCTURE: CPT

## 2019-10-06 NOTE — ED GU-FEMALE
General


Chief Complaint:  General Problems/Pain


Stated Complaint:  LOW ABD PAIN


Nursing Triage Note:  


INTENSE VAGIANL PAIN X4 DAYS.  STATES SHE IS SICK TO HER STOMACH DUE TO THE 


PAIN.


Nursing Sepsis Screen:  No Definite Risk





History of Present Illness


Date Seen by Provider:  Oct 6, 2019


Time Seen by Provider:  15:15


Initial Comments


23-year-old  female reports for right lower quadrant pain. She states 

her LMP was10/1/19, she only used pads, no tampons. Denies any new sexual 

partners or vaginal discharge, no hx of STIs. She reports pain at her  

scar and pain with urination and nausea. She had tests done with Dr. Coe about 

4 months ago because of family history of ovarian cancer and she has ovarian 

cysts. she never followed up and is asking for her results. Explained she will 

need to see Dr. Coe for this.


Timing/Duration:  intermittent


Severity/Quality:  mild


Location:  RLQ, suprapubic


Radiation:  none


Activities at Onset:  none


Sexual Gonvick History:  greater than 2 months ago


Associated Symptoms:  abdominal pain, dysuria, nausea/vomiting, urinary f

requency





Allergies and Home Medications


Allergies


Coded Allergies:  


     kiwi (Unverified  Allergy, Unknown, 14)





Home Medications


Ciprofloxacin HCl 500 Mg Tablet, 500 MG PO BID


   Prescribed by: ANGELA SHEARER on 10/6/19 1653


Phenazopyridine HCl 100 Mg Tablet, 100 MG PO Q8H


   Prescribed by: ANGELA SHEARER on 10/6/19 1653





Patient Home Medication List


Home Medication List Reviewed:  Yes





Review of Systems


Review of Systems


Constitutional:  no symptoms reported, see HPI


Gastrointestinal:  RLQ, see HPI, abdominal pain, nausea


Genitourinary:  see HPI, burning, dysuria, frequency





All Other Systemes Reviewed


Negative Unless Noted:  Yes





Past Medical-Social-Family Hx


Past Med/Social Hx:  Reviewed Nursing Past Med/Soc Hx


Patient Social History


Alcohol Use:  Rarely Uses


Recreational Drug Use:  No


Smoking Status:  Never a Smoker


2nd Hand Smoke Exposure:  No


Recent Foreign Travel:  No


Contact w/Someone Who Travel:  No


Recent Infectious Disease Expo:  No


Recent Hopitalizations:  No





Immunizations Up To Date


PED Vaccines UTD:  No





Seasonal Allergies


Seasonal Allergies:  Yes





Past Medical History


Surgeries:  Yes (wisdom teeth)


Adenoidectomy,  Section, Tonsillectomy


Respiratory:  No


Cardiac:  No


Neurological:  No


Reproductive Disorders:  No


Female Reproductive Disorders:  Ovarian Cyst


Sexually Transmitted Disease:  No


HIV/AIDS:  No


Genitourinary:  No


Gastrointestinal:  No


Musculoskeletal:  No


Endocrine:  No


HEENT:  No


Cancer:  No


Psychosocial:  No


Integumentary:  No


Blood Disorders:  No


Adverse Reaction/Blood Tranf:  No





Family Medical History





Not obtainable due to adoption





Physical Exam


Vital Signs





Vital Signs - First Documented








 10/6/19





 15:00


 


Temp 36.7


 


Pulse 102


 


Resp 18


 


B/P (MAP) 119/72 (88)


 


Pulse Ox 97


 


O2 Delivery Room Air





Capillary Refill : Less Than 3 Seconds


Height, Weight, BMI


Height: 5'2.00"


Weight: 95lbs. 0.0oz. [id number]; 17.00 BMI


Method:Stated


General Appearance:  WD/WN, no apparent distress


HEENT:  PERRL/EOMI, normal ENT inspection, TMs normal, pharynx normal


Back:  CVA tenderness (R)





Progress/Results/Core Measures


Suspected Sepsis


Recent Fever Within 48 Hours:  No


Infection Criteria Present:  None


New/Unexplained  Altered Menta:  No


Sepsis Screen:  No Definite Risk


SIRS


Temperature: 


Pulse: 102 


Respiratory Rate: 18


 


Laboratory Tests


10/6/19 15:13: White Blood Count 11.4H


Blood Pressure 119 /72 


Mean: 88


 


Laboratory Tests


10/6/19 15:13: 


Creatinine 0.72, Platelet Count 326, Total Bilirubin 0.6








Results/Orders


Lab Results





Laboratory Tests








Test


 10/6/19


15:05 10/6/19


15:13 Range/Units


 


 


Urine Color YELLOW    


 


Urine Clarity CLEAR    


 


Urine pH 6   5-9  


 


Urine Specific Gravity 1.020   1.016-1.022  


 


Urine Protein 3+ H  NEGATIVE  


 


Urine Glucose (UA) NEGATIVE   NEGATIVE  


 


Urine Ketones 1+ H  NEGATIVE  


 


Urine Nitrite NEGATIVE   NEGATIVE  


 


Urine Bilirubin NEGATIVE   NEGATIVE  


 


Urine Urobilinogen NORMAL   NORMAL  MG/DL


 


Urine Leukocyte Esterase 3+ H  NEGATIVE  


 


Urine RBC (Auto) 4+ H  NEGATIVE  


 


Urine RBC  H   /HPF


 


Urine WBC TNTC H   /HPF


 


Urine Squamous Epithelial


Cells 5-10 


 


  /HPF





 


Urine Crystals NONE    /LPF


 


Urine Bacteria MODERATE H   /HPF


 


Urine Casts NONE    /LPF


 


Urine Mucus NEGATIVE    /LPF


 


Urine Culture Indicated YES    


 


White Blood Count


 


 11.4 H


 4.3-11.0


10^3/uL


 


Red Blood Count


 


 4.96 


 4.35-5.85


10^6/uL


 


Hemoglobin  15.3  11.5-16.0  G/DL


 


Hematocrit  43  35-52  %


 


Mean Corpuscular Volume  87  80-99  FL


 


Mean Corpuscular Hemoglobin  31  25-34  PG


 


Mean Corpuscular Hemoglobin


Concent 


 35 


 32-36  G/DL





 


Red Cell Distribution Width  12.5  10.0-14.5  %


 


Platelet Count


 


 326 


 130-400


10^3/uL


 


Mean Platelet Volume  10.7 H 7.4-10.4  FL


 


Neutrophils (%) (Auto)  77 H 42-75  %


 


Lymphocytes (%) (Auto)  14  12-44  %


 


Monocytes (%) (Auto)  6  0-12  %


 


Eosinophils (%) (Auto)  2  0-10  %


 


Basophils (%) (Auto)  0  0-10  %


 


Neutrophils # (Auto)  8.8 H 1.8-7.8  X 10^3


 


Lymphocytes # (Auto)  1.6  1.0-4.0  X 10^3


 


Monocytes # (Auto)  0.7  0.0-1.0  X 10^3


 


Eosinophils # (Auto)


 


 0.3 


 0.0-0.3


10^3/uL


 


Basophils # (Auto)


 


 0.0 


 0.0-0.1


10^3/uL


 


Sodium Level  145  135-145  MMOL/L


 


Potassium Level  3.8  3.6-5.0  MMOL/L


 


Chloride Level  106    MMOL/L


 


Carbon Dioxide Level  26  21-32  MMOL/L


 


Anion Gap  13  5-14  MMOL/L


 


Blood Urea Nitrogen  8  7-18  MG/DL


 


Creatinine


 


 0.72 


 0.60-1.30


MG/DL


 


Estimat Glomerular Filtration


Rate 


 > 60 


  





 


BUN/Creatinine Ratio  11   


 


Glucose Level  98    MG/DL


 


Calcium Level  9.8  8.5-10.1  MG/DL


 


Corrected Calcium    8.5-10.1  MG/DL


 


Total Bilirubin  0.6  0.1-1.0  MG/DL


 


Aspartate Amino Transf


(AST/SGOT) 


 17 


 5-34  U/L





 


Alanine Aminotransferase


(ALT/SGPT) 


 19 


 0-55  U/L





 


Alkaline Phosphatase  50    U/L


 


Total Protein  7.2  6.4-8.2  GM/DL


 


Albumin  4.6 H 3.2-4.5  GM/DL








My Orders





Orders - ANGELA SHEARER


Urine Pregnancy Bedside (10/6/19 15:01)


Ua Culture If Indicated (10/6/19 15:01)


Cbc With Automated Diff (10/6/19 15:03)


Comprehensive Metabolic Panel (10/6/19 15:03)


Ondansetron Injection (Zofran Injectio (10/6/19 15:15)


Ed Iv/Invasive Line Start (10/6/19 15:03)


Ns Iv 1000 Ml (Sodium Chloride 0.9%) (10/6/19 15:03)


Urine Culture (10/6/19 15:05)


Phenazopyridine Tablet (Pyridium Tablet) (10/6/19 15:45)


Ketorolac Injection (Toradol Injection) (10/6/19 16:00)


Ketorolac Injection (Toradol Injection) (10/6/19 15:49)


Ciprofloxacin Tablet (Cipro Tablet) (10/6/19 16:22)





Medications Given in ED





Current Medications








 Medications  Dose


 Ordered  Sig/Arturo


 Route  Start Time


 Stop Time Status Last Admin


Dose Admin


 


 Ketorolac


 Tromethamine  30 mg  ONCE  ONCE


 IVP  10/6/19 16:00


 10/6/19 16:01 DC 10/6/19 15:51


30 MG


 


 Ondansetron HCl  4 mg  ONCE  ONCE


 IVP  10/6/19 15:15


 10/6/19 15:16 DC 10/6/19 15:44


4 MG


 


 Phenazopyridine


 HCl  200 mg  ONCE  ONCE


 PO  10/6/19 15:45


 10/6/19 15:46 DC 10/6/19 15:46


200 MG








Vital Signs/I&O











 10/6/19 10/6/19





 15:00 17:02


 


Temp 36.7 


 


Pulse 102 107


 


Resp 18 18


 


B/P (MAP) 119/72 (88) 117/70


 


Pulse Ox 97 99


 


O2 Delivery Room Air Room Air





Capillary Refill : Less Than 3 Seconds








Blood Pressure Mean:                    88








Progress Note :  


   Time:  15:15


Progress Note


Patient seen and evaluated, will obtain labs, normal saline 1 L per IV, Zofran 4

mg for nausea, and ketorolac 30 mg IV for pain.


1600 patient reports pain is improving. Urine noted to have an infection, will 

give Pyridium 200 mg orally. Patient reports nausea is improved.


1630 patient reports her in symptoms have improved, will give Cipro 500 mg 

orally in them plan discharge. Discharge instructions and return precautions 

reviewed.





Departure


Impression





   Primary Impression:  


   Urinary tract infection


   Qualified Codes:  N30.01 - Acute cystitis with hematuria


Disposition:   HOME, SELF-CARE


Condition:  Improved





Departure-Patient Inst.


Decision time for Depature:  16:30


Referrals:  


NO,LOCAL PHYSICIAN (PCP/Family)


Primary Care Physician


Patient Instructions:  Urinary Tract Infection, Adult (DC)





Add. Discharge Instructions:  


Schedule follow-up appointment with Dr. Coe for later this week.


Take antibiotic as prescribed for  urinary tract infection.


Increase water intake, 16 ounces every 2 hours while awake.


Empty bladder every 2 hours while awake.


Clear liquid diet for the next 3-4 hours then bland diet as tolerated.


You may alternate between ibuprofen 600 mg and Tylenol 650 mg every 4 hours for 

pain or fever.


Return to the emergency department for new, urgent health care needs.





All discharge instructions reviewed with patient and/or family. Voiced 

understanding.


Scripts


Phenazopyridine HCl (Pyridium) 100 Mg Tablet


100 MG PO Q8H, #6 TAB 0 Refills


   Prov: ANGELA SHEARER         10/6/19 


Ciprofloxacin HCl (Ciprofloxacin HCl) 500 Mg Tablet


500 MG PO BID, #6 TAB


   Prov: ANGELA SHEARER         10/6/19





Copy


Copies To 1:   DIANNE COE AMY ARNP                   Oct 6, 2019 15:49

## 2019-12-03 ENCOUNTER — HOSPITAL ENCOUNTER (EMERGENCY)
Dept: HOSPITAL 75 - ER | Age: 24
Discharge: HOME | End: 2019-12-03
Payer: SELF-PAY

## 2019-12-03 VITALS — HEIGHT: 62.2 IN | WEIGHT: 110.23 LBS | BODY MASS INDEX: 20.03 KG/M2

## 2019-12-03 VITALS — SYSTOLIC BLOOD PRESSURE: 112 MMHG | DIASTOLIC BLOOD PRESSURE: 78 MMHG

## 2019-12-03 DIAGNOSIS — O20.9: Primary | ICD-10-CM

## 2019-12-03 DIAGNOSIS — Z3A.08: ICD-10-CM

## 2019-12-03 DIAGNOSIS — Z90.89: ICD-10-CM

## 2019-12-03 LAB
APTT PPP: YELLOW S
BACTERIA #/AREA URNS HPF: (no result) /HPF
BASOPHILS # BLD AUTO: 0 10^3/UL (ref 0–0.1)
BASOPHILS NFR BLD AUTO: 1 % (ref 0–10)
BILIRUB UR QL STRIP: NEGATIVE
CAOX CRY #/AREA URNS LPF: (no result) /LPF
EOSINOPHIL # BLD AUTO: 0.2 10^3/UL (ref 0–0.3)
EOSINOPHIL NFR BLD AUTO: 3 % (ref 0–10)
ERYTHROCYTE [DISTWIDTH] IN BLOOD BY AUTOMATED COUNT: 12.3 % (ref 10–14.5)
FIBRINOGEN PPP-MCNC: CLEAR MG/DL
GLUCOSE UR STRIP-MCNC: NEGATIVE MG/DL
HCT VFR BLD CALC: 42 % (ref 35–52)
HGB BLD-MCNC: 14.6 G/DL (ref 11.5–16)
KETONES UR QL STRIP: (no result)
LEUKOCYTE ESTERASE UR QL STRIP: NEGATIVE
LYMPHOCYTES # BLD AUTO: 2.5 X 10^3 (ref 1–4)
LYMPHOCYTES NFR BLD AUTO: 33 % (ref 12–44)
MANUAL DIFFERENTIAL PERFORMED BLD QL: NO
MCH RBC QN AUTO: 31 PG (ref 25–34)
MCHC RBC AUTO-ENTMCNC: 35 G/DL (ref 32–36)
MCV RBC AUTO: 87 FL (ref 80–99)
MONOCYTES # BLD AUTO: 0.6 X 10^3 (ref 0–1)
MONOCYTES NFR BLD AUTO: 9 % (ref 0–12)
NEUTROPHILS # BLD AUTO: 4.1 X 10^3 (ref 1.8–7.8)
NEUTROPHILS NFR BLD AUTO: 55 % (ref 42–75)
NITRITE UR QL STRIP: NEGATIVE
PH UR STRIP: 6 [PH] (ref 5–9)
PLATELET # BLD: 286 10^3/UL (ref 130–400)
PMV BLD AUTO: 10.8 FL (ref 7.4–10.4)
PROT UR QL STRIP: NEGATIVE
RBC #/AREA URNS HPF: (no result) /HPF
SP GR UR STRIP: >=1.03 (ref 1.02–1.02)
SQUAMOUS #/AREA URNS HPF: (no result) /HPF
WBC # BLD AUTO: 7.5 10^3/UL (ref 4.3–11)
WBC #/AREA URNS HPF: (no result) /HPF

## 2019-12-03 PROCEDURE — 36415 COLL VENOUS BLD VENIPUNCTURE: CPT

## 2019-12-03 PROCEDURE — 84702 CHORIONIC GONADOTROPIN TEST: CPT

## 2019-12-03 PROCEDURE — 87088 URINE BACTERIA CULTURE: CPT

## 2019-12-03 PROCEDURE — 85025 COMPLETE CBC W/AUTO DIFF WBC: CPT

## 2019-12-03 PROCEDURE — 81000 URINALYSIS NONAUTO W/SCOPE: CPT

## 2019-12-03 NOTE — ED GU-FEMALE
General


Chief Complaint:  OB/GYN


Stated Complaint:  POSS 8 WKS PREG/VAG BLEEDING


Nursing Triage Note:  


pt is 2 months pregnant. Pt coughed et her right side hurt and she started 


bleeding. Pt has had a miscarriage in the past and says it is similar


Nursing Sepsis Screen:  No Definite Risk


Source:  patient, old records


Exam Limitations:  no limitations





History of Present Illness


Date Seen by Provider:  Dec 3, 2019


Time Seen by Provider:  18:26


Initial Comments


This 24 year old  at approximately 8 weeks gestational age presents to the 

ER with complaints of vaginal bleeding that started after she coughed today.  

Had any recent pain in her right flank and has some mild lower back pain.  She 

denies any abdominal pain or tenderness now.  She believes her LMP was .  She has not yet had an ultrasound.  She is to establish with Dr. Capellan this week.  She had a positive urine pregnancy test at home.  Her 

blood type is B+ according to records.  She denies any vaginal symptoms prior to

the bleeding.  She reports 2 prior miscarriages and states this feels similar.  

She reports some constipation.





Allergies and Home Medications


Allergies


Coded Allergies:  


     kiwi (Unverified  Allergy, Unknown, 14)





Home Medications


Ciprofloxacin HCl 500 Mg Tablet, 500 MG PO BID


   Prescribed by: ANGELA SHEARER on 10/6/19 1653


Phenazopyridine HCl 100 Mg Tablet, 100 MG PO Q8H


   Prescribed by: ANGELA SHEARER on 10/6/19 1653





Patient Home Medication List


Home Medication List Reviewed:  Yes





Review of Systems


Review of Systems


Constitutional:  no symptoms reported


EENTM:  no symptoms reported


Respiratory:  no symptoms reported


Cardiovascular:  no symptoms reported


Gastrointestinal:  see HPI


Genitourinary:  see HPI


Pregnant:  Yes


LMP:  Oct 23, 2019


Musculoskeletal:  see HPI


Skin:  no symptoms reported


Psychiatric/Neurological:  No Symptoms Reported


Endocrine:  No Symptoms Reported


Hematologic/Lymphatic:  No Symptoms Reported





Past Medical-Social-Family Hx


Past Med/Social Hx:  Reviewed Nursing Past Med/Soc Hx


Patient Social History


2nd Hand Smoke Exposure:  No


Recent Foreign Travel:  No


Contact w/Someone Who Travel:  No


Recent Infectious Disease Expo:  No


Recent Hopitalizations:  No





Immunizations Up To Date


PED Vaccines UTD:  No





Seasonal Allergies


Seasonal Allergies:  Yes





Past Medical History


Surgeries:  Yes (wisdom teeth)


Adenoidectomy,  Section, Tonsillectomy


Respiratory:  No


Cardiac:  No


Neurological:  No


Pregnant:  Yes


Last Menstrual Period:  Oct 23, 2019


Hx :  4


Hx Para:  1


Hx Total # of Abortions (Sp):  2


Reproductive Disorders:  No


Female Reproductive Disorders:  Ovarian Cyst


Sexually Transmitted Disease:  No


HIV/AIDS:  No


Genitourinary:  No


Gastrointestinal:  No


Musculoskeletal:  No


Endocrine:  No


HEENT:  No


Cancer:  No


Psychosocial:  No


Integumentary:  No


Blood Disorders:  No


Adverse Reaction/Blood Tranf:  No





Family Medical History





Not obtainable due to adoption





Physical Exam


Vital Signs





Vital Signs - First Documented








 12/3/19





 18:26


 


Temp 36.7


 


Pulse 110


 


Resp 18


 


B/P (MAP) 115/80 (92)


 


Pulse Ox 99





Capillary Refill : Less Than 3 Seconds


Height, Weight, BMI


Height: 5'2.00"


Weight: 95lbs. 0.0oz. 43.929269hq; 20.00 BMI


Method:Stated


General Appearance:  WD/WN, no apparent distress


HEENT:  PERRL/EOMI, normal ENT inspection


Neck:  normal inspection


Cardiovascular:  regular rate, rhythm, no edema, no murmur


Respiratory:  lungs clear, normal breath sounds, no respiratory distress


Gastrointestinal:  normal bowel sounds, non tender, soft


Extremities:  normal inspection, no pedal edema


Neurologic/Psychiatric:  CNs II-XII nml as tested, no motor/sensory deficits, 

alert, normal mood/affect, oriented x 3


Skin:  normal color, warm/dry





Progress/Results/Core Measures


Suspected Sepsis


Recent Fever Within 48 Hours:  No


Infection Criteria Present:  None


New/Unexplained  Altered Menta:  No


Sepsis Screen:  No Definite Risk


SIRS


Temperature: 


Pulse: 110 


Respiratory Rate: 18


 


Laboratory Tests


12/3/19 18:37: White Blood Count 7.5


Blood Pressure 115 /80 


Mean: 92


 


Laboratory Tests


12/3/19 18:37: Platelet Count 286








Results/Orders


Lab Results





Laboratory Tests








Test


 12/3/19


18:37 12/3/19


19:15 Range/Units


 


 


White Blood Count


 7.5 


 


 4.3-11.0


10^3/uL


 


Red Blood Count


 4.76 


 


 4.35-5.85


10^6/uL


 


Hemoglobin 14.6   11.5-16.0  G/DL


 


Hematocrit 42   35-52  %


 


Mean Corpuscular Volume 87   80-99  FL


 


Mean Corpuscular Hemoglobin 31   25-34  PG


 


Mean Corpuscular Hemoglobin


Concent 35 


 


 32-36  G/DL





 


Red Cell Distribution Width 12.3   10.0-14.5  %


 


Platelet Count


 286 


 


 130-400


10^3/uL


 


Mean Platelet Volume 10.8 H  7.4-10.4  FL


 


Neutrophils (%) (Auto) 55   42-75  %


 


Lymphocytes (%) (Auto) 33   12-44  %


 


Monocytes (%) (Auto) 9   0-12  %


 


Eosinophils (%) (Auto) 3   0-10  %


 


Basophils (%) (Auto) 1   0-10  %


 


Neutrophils # (Auto) 4.1   1.8-7.8  X 10^3


 


Lymphocytes # (Auto) 2.5   1.0-4.0  X 10^3


 


Monocytes # (Auto) 0.6   0.0-1.0  X 10^3


 


Eosinophils # (Auto)


 0.2 


 


 0.0-0.3


10^3/uL


 


Basophils # (Auto)


 0.0 


 


 0.0-0.1


10^3/uL


 


Human Chorionic Gonadotropin,


Quant 35585 H


 


 <5  MIU/ML





 


Urine Color  YELLOW   


 


Urine Clarity  CLEAR   


 


Urine pH  6.0  5-9  


 


Urine Specific Gravity  >=1.030  1.016-1.022  


 


Urine Protein  NEGATIVE  NEGATIVE  


 


Urine Glucose (UA)  NEGATIVE  NEGATIVE  


 


Urine Ketones  1+ H NEGATIVE  


 


Urine Nitrite  NEGATIVE  NEGATIVE  


 


Urine Bilirubin  NEGATIVE  NEGATIVE  


 


Urine Urobilinogen  0.2  < = 1.0  MG/DL


 


Urine Leukocyte Esterase  NEGATIVE  NEGATIVE  


 


Urine RBC (Auto)  3+ H NEGATIVE  


 


Urine RBC   H  /HPF


 


Urine WBC  2-5   /HPF


 


Urine Squamous Epithelial


Cells 


 2-5 


  /HPF





 


Urine Crystals  PRESENT H  /LPF


 


Urine Calcium Oxalate Crystals  FEW H  /LPF


 


Urine Bacteria  FEW H  /HPF


 


Urine Casts  NONE   /LPF


 


Urine Mucus  SMALL H  /LPF


 


Urine Culture Indicated  YES   








My Orders





Orders - MARIA GUADALUPE KARIMI MD


Cbc With Automated Diff (12/3/19 18:25)


Hcg,Quantitative (12/3/19 18:25)


Ua Culture If Indicated (12/3/19 18:25)


Urine Culture (12/3/19 19:15)





Vital Signs/I&O











 12/3/19





 18:26


 


Temp 36.7


 


Pulse 110


 


Resp 18


 


B/P (MAP) 115/80 (92)


 


Pulse Ox 99





Capillary Refill : Less Than 3 Seconds








Blood Pressure Mean:                    92


POS





Progress Note :  


Progress Note


HCG was appropriate for estimated gestational age.  Labs were unimpressive.  

Urine was concentrated and she was encouraged to increase her oral hydration.  

We discussed return precautions.  Since patient was lacking significant 

abdominal or pelvic pain, deferral of ultrasound in the morning was acceptable. 

An outpatient ultrasound order form was given.





Departure


Impression





   Primary Impression:  


   Vaginal bleeding during pregnancy


Disposition:  01 HOME, SELF-CARE


Condition:  Stable





Departure-Patient Inst.


Decision time for Depature:  19:37


Referrals:  


NORMAN CAPELLAN MD (PCP/Family)


Primary Care Physician


Patient Instructions:  Bleeding With Pregnancy (DC)





Add. Discharge Instructions:  


You may take Tylenol (acetaminophen) up to 650 mg every 6 hours as needed for 

pain.  Drink plenty of clear liquids.


Contact Dr. Capellan's office in the morning to discuss follow-up.


Present to Hospital registration at 7:30 tomorrow morning for an ultrasound.  

Try to have a fairly full bladder at the time of the ultrasound.


Return to the emergency room if you are having worsening symptoms.  


If you need emergently attention before 7:00 tomorrow morning, consider 

presenting to an emergency room has emergent ultrasound capability such as a 

[facility].


Increase your clear liquid intake to stay well-hydrated.








All discharge instructions reviewed with patient and/or family. Voiced un

derstanding.


Work/School Note:  Work Release Form   Date Seen in the Emergency Department:  

Dec 3, 2019


   Return to Work:  Dec 4, 2019


      Other Restrictions Listed Below:  May return to work after ultrasound 

 if results indicate


   Restrictions:  No lifting over 15 pounds.





Copy


Copies To 1:   NORMAN CAPELLAN MD, JOSHUA T MD         Dec 3, 2019 18:58


POS
actual

## 2019-12-04 ENCOUNTER — HOSPITAL ENCOUNTER (OUTPATIENT)
Dept: HOSPITAL 75 - RAD | Age: 24
End: 2019-12-04
Attending: FAMILY MEDICINE
Payer: SELF-PAY

## 2019-12-04 DIAGNOSIS — Z3A.00: ICD-10-CM

## 2019-12-04 DIAGNOSIS — O20.9: Primary | ICD-10-CM

## 2019-12-04 PROCEDURE — 76817 TRANSVAGINAL US OBSTETRIC: CPT

## 2019-12-04 PROCEDURE — 76801 OB US < 14 WKS SINGLE FETUS: CPT

## 2019-12-04 NOTE — DIAGNOSTIC IMAGING REPORT
INDICATION: Pregnant female with first trimester bleeding.



Transabdominal and transvaginal sonography was performed. There

is a very small intrauterine gestational sac which contains a

yolk sac. No fetal pole is identified at this time. In addition,

there appears to be a moderate-sized perigestational sac fluid

collection suggestive of perigestational sac hemorrhage. Adnexal

evaluation demonstrates the ovaries to be unremarkable. No

adnexal mass or free fluid is detected.



IMPRESSION: Small intrauterine gestational sac without evidence

of fetal pole. While this may be owing to very early pregnancy,

possibility of blighted ovum cannot be entirely excluded.

Continued follow-up with serial beta hCG levels and/or follow-up

ultrasound would be recommended to evaluate for viability. Note

is made of a moderate-sized perigestational sac hemorrhage. There

is no evidence of adnexal pregnancy.



Dictated by: 



  Dictated on workstation # [id number]

## 2019-12-29 NOTE — XMS REPORT
Continuity of Care Document

                             Created on: 2019



ALEJANDRINA SCHAEFFER

External Reference #: [id number]

: 1995

Sex: Female



Demographics





                          Address                   [address] [place] [address]

 

                          Home Phone                [phone] x

 

                          Preferred Language        Unknown

 

                          Marital Status            Unknown

 

                          [employer]ar[employer] Affiliation     Unknown

 

                          Race                      Unknown

 

                          Ethnic Group              Unknown





Author





                          Organization              Unknown

 

                          Address                   Unknown

 

                          Phone                     Unavailable



              



Allergies

      



             Active           Description           Code           Type         

  Severity   

                Reaction           Onset           Reported/Identified          

 

Relationship to Patient                 Clinical Status        

 

             Yes           kiwi           [id number]           Drug Allergy     

      Unknown

             N/A                        2014                              

   



                  



Medications

      



There is no data.                  



Problems

      



             Date Dx Coded           Attending           Type           Code    

       

Diagnosis                               Diagnosed By        

 

           2010                       Ot           826.0                  

           

  

 

           2010                       Ot           959.7                  

           

  

 

           2010                       Ot           E000.8                 

           

   

 

           2010                       Ot           E849.0                 

           

   

 

           2010                       Ot           E917.9                 

           

   

 

           2011                       Ot           276.51                 

           

   

 

           2011                       Ot           493.00                 

           

   

 

           2011                       Ot           536.2                  

           

  

 

             2011                        Ot           564.1           IRRI

TABLE BOWEL 

SYNDROME                                         

 

             2011                        Ot           789.01           ABD

OMINAL PAIN, 

RIGHT UPPER QUADRANT                             

 

                2014           LALA MCCOY           Ot         

     599.0         

                          URIN TRACT INFECTION NOS                    

 

                2014           LALA MCCOY           Ot         

     620.2         

                          OVARIAN CYST NEC/NOS                    

 

                2014           LALA MCCOY           Ot         

     623.8         

                          NONINFLAM DIS VAGINA NEC                    

 

                2014           LALA MCCOY           Ot         

     789.09        

                          ABDOMINAL PAIN, OTHER SPECIFIED SITE                  

  

 

                2014           LALA MCCOY           Ot         

     V69.2         

                          HIGH-RISK SEXUAL BEHAVIOR                    

 

           2015                       Ot           611.72                 

           

   

 

           2015                       Ot           611.72                 

           

   

 

             2015           RACH LARA APRN           Ot           599

.0           

URIN TRACT INFECTION NOS                         

 

                2015           RACH LARA APRN           Ot           

   681.00          

                          CELLULITIS, FINGER NOS                    

 

             2015           RACH LARA APRN           Ot           883

.0           

OPEN WOUND OF FINGER                             

 

             2015           RACH LARA APRN           Ot           883

.1           

OPEN WOUND FINGER-COMPL                          

 

                2015           RACH LARA APRN           Ot           

   E000.8          

                          OTHER EXTERNAL CAUSE STATUS                    

 

                2015           RACH LARA APRN           Ot           

   E849.0          

                          ACCIDENT IN HOME                    

 

                2015           RACH LARA           Ot           

   E920.8          

                          ACC-CUTTING INSTRUM NEC                    

 

             2017                        Ot           611.72           LUM

P OR MASS IN 

BREAST                                           

 

                2017           VAL TRAMMELL, NORMAN RANGEL Ot     

         O32.1XX0  

                          MATERNAL CARE FOR BREECH PRESENTATION, U              

      

 

                2017           NORMAN NEAL MD, Ot     

         O41.03X0  

                          OLIGOHYDRAMNIOS, THIRD TRIMESTER, NOT AP              

      

 

                2017           VAL TRAMMELL, NORMAN RANGEL           Ot     

         O99.824   

                          STREPTOCOCCUS B CARRIER STATE COMPLICATI              

      

 

                2017           NORMAN NEAL MD, Ot     

         Z23       

                          ENCOUNTER FOR IMMUNIZATION                    

 

                2017           VAL TRAMMELL, NORMAN RANGEL Ot     

         Z37.0     

                          SINGLE LIVE BIRTH                    

 

                2017           NORMAN NEAL MD, Ot     

         Z3A.38    

                          38 WEEKS GESTATION OF PREGNANCY                    

 

                2018           RACH LARA           Ot           

   N83.291         

                          OTHER OVARIAN CYST, RIGHT SIDE                    

 

             2018           RACH LARA           Ot           O20

.0           

THREATENED                               

 

                2018           RACH LARA           Ot           

   O34.80          

                          MATERNAL CARE FOR OTH ABNLT OF PELVIC OR              

      

 

                2018           RACH LARA           Ot           

   O99.89          

                          OTH DISEASES AND CONDITIONS COMPL PREG/C              

      

 

                2018           RACH LARA           Ot           

   Z3A.00          

                          WEEKS OF GESTATION OF PREGNANCY NOT SPEC              

      

 

                2018           RACH LARA APRVALERIE           Ot           

   N83.291         

                          OTHER OVARIAN CYST, RIGHT SIDE                    

 

             2018           RACH LARA           Ot           O20

.0           

THREATENED                               

 

                2018           RACH LARA APRVALERIE           Ot           

   O34.80          

                          MATERNAL CARE FOR OTH ABNLT OF PELVIC OR              

      

 

                2018           RACH LARA APRN           Ot           

   O99.89          

                          OTH DISEASES AND CONDITIONS COMPL PREG/C              

      

 

                2018           RACH LARA APRN           Ot           

   Z3A.00          

                          WEEKS OF GESTATION OF PREGNANCY NOT SPEC              

      

 

             2018           ANGELA SHEARERP           Ot           O20.0   

        

THREATENED                               

 

             2018           ANGELA SHEARERP           Ot           O20.9   

        

HEMORRHAGE IN EARLY PREGNANCY, UNSPECIFI                    

 

             2018           ANGELA SHEARER ARNP           Ot           Z3A.01  

         LESS

THAN 8 WEEKS GESTATION OF PREGNANCY                    

 

             2018           ANGELA SHEARER           Ot           Z87.59  

         

PERSONAL HISTORY OF COMP OF PREG, CHLDBR                    

 

             2018           ANGELA SHEARER           Ot           Z90.89  

         

ACQUIRED ABSENCE OF OTHER ORGANS                    

 

                2018           TC ALEMAN DO           Ot          

    R10.2          

                          PELVIC AND PERINEAL PAIN                    

 

                2018           TC ALEMAN DO           Ot          

    Z3A.01         

                          LESS THAN 8 WEEKS GESTATION OF PREGNANCY              

      

 

             2018                        Ot           O03.9           COMP

LETE OR UNSP 

SPONTANEOUS  WI                          

 

             2018                        Ot           O99.89           OTH

 DISEASES AND 

CONDITIONS COMPL PREG/C                          

 

             2018                        Ot           Z3A.11           11 

WEEKS 

GESTATION OF PREGNANCY                           

 

             2018                        Ot           Z90.89           ACQ

UIRED ABSENCE 

OF OTHER ORGANS                                  

 

             2018                        Ot           Z98.890           OT

HER SPECIFIED 

POSTPROCEDURAL STATES                            

 

             2018                        Ot           O03.4           INCO

MPLETE 

SPONTANEOUS  WITHOUT                     

 

             2018                        Ot           Z87.59           PER

MUNA HISTORY 

OF COMP OF PREG, CHLDBR                          

 

             2018                        Ot           Z90.89           ACQ

UIRED ABSENCE 

OF OTHER ORGANS                                  

 

             2018                        Ot           O03.9           COMP

LETE OR UNSP 

SPONTANEOUS  WI                          

 

             2018                        Ot           O99.89           OTH

 DISEASES AND 

CONDITIONS COMPL PREG/C                          

 

             2018                        Ot           Z3A.11           11 

WEEKS 

GESTATION OF PREGNANCY                           

 

             2018                        Ot           Z90.89           ACQ

UIRED ABSENCE 

OF OTHER ORGANS                                  

 

             2018                        Ot           Z98.890           OT

HER SPECIFIED 

POSTPROCEDURAL STATES                            

 

             2018                        Ot           O03.4           INCO

MPLETE 

SPONTANEOUS  WITHOUT                     

 

             2018                        Ot           Z87.59           PER

MUNA HISTORY 

OF COMP OF PREG, CHLDBR                          

 

             2018                        Ot           Z90.89           ACQ

UIRED ABSENCE 

OF OTHER ORGANS                                  

 

                2018           RACH LARA           Ot           

   S81.811A        

                          LACERATION W/O FOREIGN BODY, RIGHT LOWER              

      

 

                2018           RACH LARA           Ot           

   W19.XXXA        

                          UNSPECIFIED FALL, INITIAL ENCOUNTER                   

 

 

                2018           RACH LARA           Ot           

   Z90.89          

                          ACQUIRED ABSENCE OF OTHER ORGANS                    

 

                2018           RACH LARA           Ot           

   Z98.890         

                          OTHER SPECIFIED POSTPROCEDURAL STATES                 

   

 

                2018           TC ALEMAN DO           Ot          

    R10.2          

                          PELVIC AND PERINEAL PAIN                    

 

                2018           ZHANGECH DOTC S           Ot          

    Z3A.01         

                          LESS THAN 8 WEEKS GESTATION OF PREGNANCY              

      

 

                2018           RACH LARA           Ot           

   S81.811A        

                          LACERATION W/O FOREIGN BODY, RIGHT LOWER              

      

 

                2018           RACH LARA           Ot           

   W19.XXXA        

                          UNSPECIFIED FALL, INITIAL ENCOUNTER                   

 

 

                2018           RACH LARA           Ot           

   Z90.89          

                          ACQUIRED ABSENCE OF OTHER ORGANS                    

 

                2018           RACH LARA           Ot           

   Z98.890         

                          OTHER SPECIFIED POSTPROCEDURAL STATES                 

   

 

             2018           DOROTHY ESTRELLA           Ot           M25.562 

          

PAIN IN LEFT KNEE                                

 

             2018           BERNDOROTHY AGUDELO           Ot           M79.662 

          

PAIN IN LEFT LOWER LEG                           

 

             2018           DOROTHY ESTRELLA           Ot           Z90.89  

         

ACQUIRED ABSENCE OF OTHER ORGANS                    

 

             2018           BERNDOROTHY AGUDELO           Ot           Z98.890 

          

OTHER SPECIFIED POSTPROCEDURAL STATES                    

 

                2018           TC ALEMAN DO S           Ot          

    R10.2          

                          PELVIC AND PERINEAL PAIN                    

 

                2018           LOVE SCHROEDER, TC S           Ot          

    Z3A.01         

                          LESS THAN 8 WEEKS GESTATION OF PREGNANCY              

      

 

                2018           RACH LARA           Ot           

   S81.811A        

                          LACERATION W/O FOREIGN BODY, RIGHT LOWER              

      

 

                2018           RACH LARA           Ot           

   W19.XXXA        

                          UNSPECIFIED FALL, INITIAL ENCOUNTER                   

 

 

                2018           RACH LARA           Ot           

   Z90.89          

                          ACQUIRED ABSENCE OF OTHER ORGANS                    

 

                2018           RACH LARA           Ot           

   Z98.890         

                          OTHER SPECIFIED POSTPROCEDURAL STATES                 

   

 

             2018           DOROTHY ESTRELLA           Ot           M25.562 

          

PAIN IN LEFT KNEE                                

 

             2018           DOROTHY ESTRELLA           Ot           M79.662 

          

PAIN IN LEFT LOWER LEG                           

 

             2018           ELÍAS ESTRELLAIS           Ot           Z90.89  

         

ACQUIRED ABSENCE OF OTHER ORGANS                    

 

             2018           DOROTHY ESTRELLA           Ot           Z98.890 

          

OTHER SPECIFIED POSTPROCEDURAL STATES                    

 

             2018           MISA TRAMMELL, MATT ROMERO           Ot           N92.

6           

IRREGULAR MENSTRUATION, UNSPECIFIED                    

 

             2018           MISA TRAMMELL, MATT ROMERO           Ot           N93.

9           

ABNORMAL UTERINE AND VAGINAL BLEEDING, U                    

 

                2018           MISA TRAMMELL, MATT ROMERO           Ot            

  Z87.448          

                          PERSONAL HISTORY OF OTHER DISEASES OF UR              

      

 

             2018           MISA TRAMMELL, MATT ROMERO           Ot           Z90.

89           

ACQUIRED ABSENCE OF OTHER ORGANS                    

 

                2018           MISA TRAMMELL, MATT ROMERO           Ot            

  Z98.890          

                          OTHER SPECIFIED POSTPROCEDURAL STATES                 

   

 

                10/06/2019           ZHANGECH DO, TC S           Ot          

    R10.2          

                          PELVIC AND PERINEAL PAIN                    

 

                10/06/2019           FENECH DO, TC S           Ot          

    Z3A.01         

                          LESS THAN 8 WEEKS GESTATION OF PREGNANCY              

      

 

             10/06/2019           MANFRED, ANGELA ARNP           Ot           N39.0   

        

URINARY TRACT INFECTION, SITE NOT SPECIF                    

 

             10/06/2019           MANFRED, ANGELA ARNP           Ot           R10.31  

         

RIGHT LOWER QUADRANT PAIN                        

 

             10/06/2019           MANFRED, ANGELA ARNP           Ot           Z80.41  

         

FAMILY HISTORY OF MALIGNANT NEOPLASM OF                     

 

             10/06/2019           MANFRED, ANGELA ARNP           Ot           Z90.89  

         

ACQUIRED ABSENCE OF OTHER ORGANS                    

 

             10/11/2019           MANFRED, ANGELA ARNP           Ot           N39.0   

        

URINARY TRACT INFECTION, SITE NOT SPECIF                    

 

             10/11/2019           MANFRED, ANGELA ARNP           Ot           R10.31  

         

RIGHT LOWER QUADRANT PAIN                        

 

             10/11/2019           MANFRED, ANGELA ARNP           Ot           Z80.41  

         

FAMILY HISTORY OF MALIGNANT NEOPLASM OF                     

 

             10/11/2019           MANFRED, ANGELA ARNP           Ot           Z90.89  

         

ACQUIRED ABSENCE OF OTHER ORGANS                    

 

                2019           LOVE SCHROEDER, TC S           Ot          

    R10.2          

                          PELVIC AND PERINEAL PAIN                    

 

                2019           LOVE SCHROEDER, TC S           Ot          

    Z3A.01         

                          LESS THAN 8 WEEKS GESTATION OF PREGNANCY              

      

 

                2019           SACHI TRAMMELL, MARIA GUADALUPE BENEDICT           Ot      

        O20.9      

                          HEMORRHAGE IN EARLY PREGNANCY, UNSPECIFI              

      

 

                2019           SACHI TRAMMELL, MARIA GUADALUPE BENEDICT           Ot      

        Z3A.00     

                          WEEKS OF GESTATION OF PREGNANCY NOT SPEC              

      



                                                                                
                                                                                
                                                                    



Procedures

      



                Code            Description           Performed By           Per

formed On        

 

                                      [id number]                                 EX

TRACTION OF POC, LOW 

CERVICAL, OPEN AP                                               2017      

  



                  



Results

      



                    Test                Result              Range        

 

                                        Complete blood count (CBC) with automate

d white blood cell (WBC) differential - 

17 09:35         

 

                          Blood leukocytes automated count (number/volume)      

     10.3 10*3/uL         

                                        4.3-11.0        

 

                          Blood erythrocytes automated count (number/volume)    

       4.18 10*6/uL       

                                        4.35-5.85        

 

                    Venous blood hemoglobin measurement (mass/volume)           

12.5 g/dL           

11.5-16.0        

 

                    Blood hematocrit (volume fraction)           36 %           

     35-52        

 

                    Automated erythrocyte mean corpuscular volume           86 [

foz_us]           

80-99        

 

                                        Automated erythrocyte mean corpuscular h

emoglobin (mass per erythrocyte)        

                          30 pg                     25-34        

 

                                        Automated erythrocyte mean corpuscular h

emoglobin concentration measurement 

(mass/volume)             35 g/dL                   32-36        

 

                    Automated erythrocyte distribution width ratio           12.

8 %              10.0-

14.5        

 

                    Automated blood platelet count (count/volume)           179 

10*3/uL           

130-400        

 

                          Automated blood platelet mean volume measurement      

     11.1 [foz_us]        

                                        7.4-10.4        

 

                    Automated blood neutrophils/100 leukocytes           78 %   

             42-75       

 

 

                    Automated blood lymphocytes/100 leukocytes           15 %   

             12-44       

 

 

                    Blood monocytes/100 leukocytes           7 %                

 0-12        

 

                    Automated blood eosinophils/100 leukocytes           1 %    

             0-10        

 

                    Automated blood basophils/100 leukocytes           0 %      

           0-10        

 

                    Blood neutrophils automated count (number/volume)           

8.0 10*3            

1.8-7.8        

 

                    Blood lymphocytes automated count (number/volume)           

1.5 10*3            

1.0-4.0        

 

                    Blood monocytes automated count (number/volume)           0.

7 10*3            

0.0-1.0        

 

                    Automated eosinophil count           0.1 10*3/uL           0

.0-0.3        

 

                    Automated blood basophil count (count/volume)           0.0 

10*3/uL           

0.0-0.1        

 

                                        Blood type T Indirect antibody screen [facility] - 17 09:35         

 

                    ABO+Rh group           BP                  NRG        

 

                    Transfusion band number           [id number]             NRG   

     

 

                    Blood group antibody screen           NEGATIVE            NR

G        

 

                                        Complete blood count (CBC) with automate

d white blood cell (WBC) differential - 

18 10:49         

 

                          Blood leukocytes automated count (number/volume)      

     6.3 10*3/uL          

                                        4.3-11.0        

 

                          Blood erythrocytes automated count (number/volume)    

       4.96 10*6/uL       

                                        4.35-5.85        

 

                    Venous blood hemoglobin measurement (mass/volume)           

15.2 g/dL           

11.5-16.0        

 

                    Blood hematocrit (volume fraction)           43 %           

     35-52        

 

                    Automated erythrocyte mean corpuscular volume           87 [

foz_us]           

80-99        

 

                                        Automated erythrocyte mean corpuscular h

emoglobin (mass per erythrocyte)        

                          31 pg                     25-34        

 

                                        Automated erythrocyte mean corpuscular h

emoglobin concentration measurement 

(mass/volume)             35 g/dL                   32-36        

 

                    Automated erythrocyte distribution width ratio           12.

5 %              10.0-

14.5        

 

                    Automated blood platelet count (count/volume)           271 

10*3/uL           

130-400        

 

                          Automated blood platelet mean volume measurement      

     10.8 [foz_us]        

                                        7.4-10.4        

 

                    Automated blood neutrophils/100 leukocytes           67 %   

             42-75       

 

 

                    Automated blood lymphocytes/100 leukocytes           22 %   

             12-44       

 

 

                    Blood monocytes/100 leukocytes           9 %                

 0-12        

 

                    Automated blood eosinophils/100 leukocytes           3 %    

             0-10        

 

                    Automated blood basophils/100 leukocytes           0 %      

           0-10        

 

                    Blood neutrophils automated count (number/volume)           

4.3 10*3            

1.8-7.8        

 

                    Blood lymphocytes automated count (number/volume)           

1.4 10*3            

1.0-4.0        

 

                    Blood monocytes automated count (number/volume)           0.

5 10*3            

0.0-1.0        

 

                    Automated eosinophil count           0.2 10*3/uL           0

.0-0.3        

 

                    Automated blood basophil count (count/volume)           0.0 

10*3/uL           

0.0-0.1        

 

                                        Comprehensive metabolic panel - 18

 10:49         

 

                          Serum or plasma sodium measurement (moles/volume)     

      140 mmol/L          

                                        135-145        

 

                          Serum or plasma potassium measurement (moles/volume)  

         3.5 mmol/L       

                                        3.6-5.0        

 

                          Serum or plasma chloride measurement (moles/volume)   

        106 mmol/L        

                                                

 

                    Carbon dioxide           24 mmol/L           21-32        

 

                          Serum or plasma anion gap determination (moles/volume)

           10 mmol/L      

                                        5-14        

 

                          Serum or plasma urea nitrogen measurement (mass/volume

)           6 mg/dL       

                                        7-18        

 

                          Serum or plasma creatinine measurement (mass/volume)  

         0.68 mg/dL       

                                        0.60-1.30        

 

                    Serum or plasma urea nitrogen/creatinine mass ratio         

  9                   NRG  

      

 

                                        Serum or plasma creatinine measurement w

ith calculation of estimated glomerular 

filtration rate           >                         NRG        

 

                    Serum or plasma glucose measurement (mass/volume)           

94 mg/dL            

        

 

                    Serum or plasma calcium measurement (mass/volume)           

9.9 mg/dL           

8.5-10.1        

 

                          Serum or plasma total bilirubin measurement (mass/volu

me)           1.0 mg/dL   

                                        0.1-1.0        

 

                                        Serum or plasma alkaline phosphatase elizabeth

surement (enzymatic activity/volume)    

                          51 U/L                            

 

                                        Serum or plasma aspartate aminotransfera

se measurement (enzymatic 

activity/volume)           16 U/L                    5-34        

 

                                        Serum or plasma alanine aminotransferase

 measurement (enzymatic activity/volume)

                          12 U/L                    0-55        

 

                    Serum or plasma protein measurement (mass/volume)           

7.5 g/dL            

6.4-8.2        

 

                    Serum or plasma albumin measurement (mass/volume)           

5.0 g/dL            

3.2-4.5        

 

                                        Serum or plasma choriogonadotropin measu

rement (units/volume) - 18 10:49  

       

 

                          Serum or plasma choriogonadotropin measurement (units/

volume)           1076 

m[iU]/mL                                <5        

 

                                        Complete urinalysis with reflex to cultu

re - 18 10:55         

 

                    Urine color determination           YELLOW              NRG 

       

 

                    Urine clarity determination           CLEAR               NR

G        

 

                    Urine pH measurement by test strip           7              

     5-9        

 

                    Specific gravity of urine by test strip           1.010     

          1.016-1.022  

      

 

                          Urine protein assay by test strip, semi-quantitative  

         NEGATIVE         

                                        NEGATIVE        

 

                    Urine glucose detection by automated test strip           NE

GATIVE            

NEGATIVE        

 

                          Erythrocytes detection in urine sediment by light micr

oscopy           NEGATIVE 

                                        NEGATIVE        

 

                    Urine ketones detection by automated test strip           3+

                  NEGATIVE

        

 

                    Urine nitrite detection by test strip           NEGATIVE    

        NEGATIVE    

    

 

                    Urine total bilirubin detection by test strip           NEGA

TIVE            

NEGATIVE        

 

                          Urine urobilinogen measurement by automated test strip

 (mass/volume)           

NORMAL                                  NORMAL        

 

                    Urine leukocyte esterase detection by dipstick           1+ 

                 NEGATIVE 

       

 

                                        Automated urine sediment erythrocyte cou

nt by microscopy (number/high power 

field)                    NONE                      NRG        

 

                                        Automated urine sediment leukocyte count

 by microscopy (number/high power field)

                          RARE                      NRG        

 

                          Bacteria detection in urine sediment by light microsco

py           NEGATIVE     

                                        NRG        

 

                                        Squamous epithelial cells detection in u

rine sediment by light microscopy       

                          0-2                       NRG        

 

                          Crystals detection in urine sediment by light microsco

py           NONE         

                                        NRG        

 

                    Casts detection in urine sediment by light microscopy       

    NONE                

NRG        

 

                          Mucus detection in urine sediment by light microscopy 

          NEGATIVE        

                                        NRG        

 

                    Complete urinalysis with reflex to culture           NO     

             NRG        

 

                                        Complete blood count (CBC) with automate

d white blood cell (WBC) differential - 

18 17:05         

 

                          Blood leukocytes automated count (number/volume)      

     6.1 10*3/uL          

                                        4.3-11.0        

 

                          Blood erythrocytes automated count (number/volume)    

       4.53 10*6/uL       

                                        4.35-5.85        

 

                    Venous blood hemoglobin measurement (mass/volume)           

13.8 g/dL           

11.5-16.0        

 

                    Blood hematocrit (volume fraction)           39 %           

     35-52        

 

                    Automated erythrocyte mean corpuscular volume           87 [

foz_us]           

80-99        

 

                                        Automated erythrocyte mean corpuscular h

emoglobin (mass per erythrocyte)        

                          31 pg                     25-34        

 

                                        Automated erythrocyte mean corpuscular h

emoglobin concentration measurement 

(mass/volume)             35 g/dL                   32-36        

 

                    Automated erythrocyte distribution width ratio           12.

5 %              10.0-

14.5        

 

                    Automated blood platelet count (count/volume)           269 

10*3/uL           

130-400        

 

                          Automated blood platelet mean volume measurement      

     10.8 [foz_us]        

                                        7.4-10.4        

 

                    Automated blood neutrophils/100 leukocytes           54 %   

             42-75       

 

 

                    Automated blood lymphocytes/100 leukocytes           32 %   

             12-44       

 

 

                    Blood monocytes/100 leukocytes           11 %               

 0-12        

 

                    Automated blood eosinophils/100 leukocytes           4 %    

             0-10        

 

                    Automated blood basophils/100 leukocytes           1 %      

           0-10        

 

                    Blood neutrophils automated count (number/volume)           

3.3 10*3            

1.8-7.8        

 

                    Blood lymphocytes automated count (number/volume)           

1.9 10*3            

1.0-4.0        

 

                    Blood monocytes automated count (number/volume)           0.

6 10*3            

0.0-1.0        

 

                    Automated eosinophil count           0.2 10*3/uL           0

.0-0.3        

 

                    Automated blood basophil count (count/volume)           0.0 

10*3/uL           

0.0-0.1        

 

                                        Serum or plasma choriogonadotropin (preg

rosa test) detection - 18 17:05  

       

 

                          Serum or plasma choriogonadotropin (pregnancy test) de

tection           POSITIVE

                                        NEGATIVE        

 

                                        Comprehensive metabolic panel - 18

 17:05         

 

                          Serum or plasma sodium measurement (moles/volume)     

      141 mmol/L          

                                        135-145        

 

                          Serum or plasma potassium measurement (moles/volume)  

         3.3 mmol/L       

                                        3.6-5.0        

 

                          Serum or plasma chloride measurement (moles/volume)   

        107 mmol/L        

                                                

 

                    Carbon dioxide           25 mmol/L           21-32        

 

                          Serum or plasma anion gap determination (moles/volume)

           9 mmol/L       

                                        5-14        

 

                          Serum or plasma urea nitrogen measurement (mass/volume

)           8 mg/dL       

                                        7-18        

 

                          Serum or plasma creatinine measurement (mass/volume)  

         0.73 mg/dL       

                                        0.60-1.30        

 

                    Serum or plasma urea nitrogen/creatinine mass ratio         

  11                  NRG 

       

 

                                        Serum or plasma creatinine measurement w

ith calculation of estimated glomerular 

filtration rate           >                         NRG        

 

                    Serum or plasma glucose measurement (mass/volume)           

124 mg/dL           

        

 

                    Serum or plasma calcium measurement (mass/volume)           

9.5 mg/dL           

8.5-10.1        

 

                          Serum or plasma total bilirubin measurement (mass/volu

me)           0.7 mg/dL   

                                        0.1-1.0        

 

                                        Serum or plasma alkaline phosphatase elizabeth

surement (enzymatic activity/volume)    

                          50 U/L                            

 

                                        Serum or plasma aspartate aminotransfera

se measurement (enzymatic 

activity/volume)           12 U/L                    5-34        

 

                                        Serum or plasma alanine aminotransferase

 measurement (enzymatic activity/volume)

                          9 U/L                     0-55        

 

                    Serum or plasma protein measurement (mass/volume)           

7.0 g/dL            

6.4-8.2        

 

                    Serum or plasma albumin measurement (mass/volume)           

4.8 g/dL            

3.2-4.5        

 

                                        Serum or plasma choriogonadotropin measu

rement (units/volume) - 18 17:05  

       

 

                          Serum or plasma choriogonadotropin measurement (units/

volume)           3410 

m[iU]/mL                                <5        

 

                                        Complete blood count (CBC) with automate

d white blood cell (WBC) differential - 

18 15:47         

 

                          Blood leukocytes automated count (number/volume)      

     5.8 10*3/uL          

                                        4.3-11.0        

 

                          Blood erythrocytes automated count (number/volume)    

       5.18 10*6/uL       

                                        4.35-5.85        

 

                    Venous blood hemoglobin measurement (mass/volume)           

15.6 g/dL           

11.5-16.0        

 

                    Blood hematocrit (volume fraction)           45 %           

     35-52        

 

                    Automated erythrocyte mean corpuscular volume           87 [

foz_us]           

80-99        

 

                                        Automated erythrocyte mean corpuscular h

emoglobin (mass per erythrocyte)        

                          30 pg                     25-34        

 

                                        Automated erythrocyte mean corpuscular h

emoglobin concentration measurement 

(mass/volume)             35 g/dL                   32-36        

 

                    Automated erythrocyte distribution width ratio           12.

8 %              10.0-

14.5        

 

                    Automated blood platelet count (count/volume)           259 

10*3/uL           

130-400        

 

                          Automated blood platelet mean volume measurement      

     11.7 [foz_us]        

                                        7.4-10.4        

 

                    Automated blood neutrophils/100 leukocytes           55 %   

             42-75       

 

 

                    Automated blood lymphocytes/100 leukocytes           32 %   

             12-44       

 

 

                    Blood monocytes/100 leukocytes           9 %                

 0-12        

 

                    Automated blood eosinophils/100 leukocytes           3 %    

             0-10        

 

                    Automated blood basophils/100 leukocytes           1 %      

           0-10        

 

                    Blood neutrophils automated count (number/volume)           

3.2 10*3            

1.8-7.8        

 

                    Blood lymphocytes automated count (number/volume)           

1.8 10*3            

1.0-4.0        

 

                    Blood monocytes automated count (number/volume)           0.

5 10*3            

0.0-1.0        

 

                    Automated eosinophil count           0.2 10*3/uL           0

.0-0.3        

 

                    Automated blood basophil count (count/volume)           0.0 

10*3/uL           

0.0-0.1        

 

                                        Comprehensive metabolic panel - 18

 15:47         

 

                          Serum or plasma sodium measurement (moles/volume)     

      142 mmol/L          

                                        135-145        

 

                          Serum or plasma potassium measurement (moles/volume)  

         3.4 mmol/L       

                                        3.6-5.0        

 

                          Serum or plasma chloride measurement (moles/volume)   

        102 mmol/L        

                                                

 

                    Carbon dioxide           31 mmol/L           21-32        

 

                          Serum or plasma anion gap determination (moles/volume)

           9 mmol/L       

                                        5-14        

 

                          Serum or plasma urea nitrogen measurement (mass/volume

)           7 mg/dL       

                                        7-18        

 

                          Serum or plasma creatinine measurement (mass/volume)  

         0.69 mg/dL       

                                        0.60-1.30        

 

                    Serum or plasma urea nitrogen/creatinine mass ratio         

  10                  NRG 

       

 

                                        Serum or plasma creatinine measurement w

ith calculation of estimated glomerular 

filtration rate           >                         NRG        

 

                    Serum or plasma glucose measurement (mass/volume)           

70 mg/dL            

        

 

                          Serum or plasma calcium measurement (mass/volume)     

      10.4 mg/dL          

                                        8.5-10.1        

 

                          Serum or plasma total bilirubin measurement (mass/volu

me)           0.4 mg/dL   

                                        0.1-1.0        

 

                                        Serum or plasma alkaline phosphatase elizabeth

surement (enzymatic activity/volume)    

                          54 U/L                            

 

                                        Serum or plasma aspartate aminotransfera

se measurement (enzymatic 

activity/volume)           17 U/L                    5-34        

 

                                        Serum or plasma alanine aminotransferase

 measurement (enzymatic activity/volume)

                          16 U/L                    0-55        

 

                    Serum or plasma protein measurement (mass/volume)           

8.3 g/dL            

6.4-8.2        

 

                    Serum or plasma albumin measurement (mass/volume)           

5.1 g/dL            

3.2-4.5        

 

                                        THYROID STIMULATING HORMONE - 18 1

5:47         

 

                    THYROID STIMULATING HORMONE           0.57 u[iU]/mL         

  0.35-4.94        

 

                                        Serum or plasma C reactive protein measu

rement (mass/volume) - 18 15:47   

      

 

                          Serum or plasma C reactive protein measurement (mass/v

olume)           0.04 

mg/dL                                   0.00-0.50        

 

                                        Complete urinalysis with reflex to cultu

re - 18 15:50         

 

                    Urine color determination           YELLOW              NRG 

       

 

                    Urine clarity determination           VERY CLOUDY           

 NRG        

 

                    Urine pH measurement by test strip           7              

     5-9        

 

                    Specific gravity of urine by test strip           1.015     

          1.016-1.022  

      

 

                    Urine protein assay by test strip, semi-quantitative        

   1+                  

NEGATIVE        

 

                    Urine glucose detection by automated test strip           NE

GATIVE            

NEGATIVE        

 

                          Erythrocytes detection in urine sediment by light micr

oscopy           5+       

                                        NEGATIVE        

 

                    Urine ketones detection by automated test strip           NE

GATIVE            

NEGATIVE        

 

                    Urine nitrite detection by test strip           NEGATIVE    

        NEGATIVE    

    

 

                    Urine total bilirubin detection by test strip           NEGA

TIVE            

NEGATIVE        

 

                          Urine urobilinogen measurement by automated test strip

 (mass/volume)           

NORMAL                                  NORMAL        

 

                    Urine leukocyte esterase detection by dipstick           1+ 

                 NEGATIVE 

       

 

                                        Automated urine sediment erythrocyte cou

nt by microscopy (number/high power 

field)                    TNTC                      NRG        

 

                                        Automated urine sediment leukocyte count

 by microscopy (number/high power field)

                           [HPF]                    NRG        

 

                          Bacteria detection in urine sediment by light microsco

py           FEW          

                                        NRG        

 

                          Crystals detection in urine sediment by light microsco

py           NONE         

                                        NRG        

 

                    Casts detection in urine sediment by light microscopy       

    NONE                

NRG        

 

                          Mucus detection in urine sediment by light microscopy 

          NEGATIVE        

                                        NRG        

 

                    Complete urinalysis with reflex to culture           NO     

             NRG        

 

                                        Microscopic examination by wet preparati

on - 18 17:15         

 

                    WET PREP RESULTS           OCCASIONAL WBC            NRG    

    

 

                                        Chlamydia trachomatis DNA detection by p

robe and signal amplification method - 

18 17:15         

 

                                        Chlamydia trachomatis DNA detection by p

robe and target amplification method    

                          Not Detected              Not Detected        

 

                                        Neisseria gonorrhoeae DNA detection by p

robe and signal amplification method - 

18 17:15         

 

                    Gonorrhea amp DNA-urine           Not Detected            No

t Detected        

 

                                        Complete urinalysis with reflex to cultu

re - 10/06/19 15:05         

 

                    Urine color determination           YELLOW              NRG 

       

 

                    Urine clarity determination           CLEAR               NR

G        

 

                    Urine pH measurement by test strip           6              

     5-9        

 

                    Specific gravity of urine by test strip           1.020     

          1.016-1.022  

      

 

                    Urine protein assay by test strip, semi-quantitative        

   3+                  

NEGATIVE        

 

                    Urine glucose detection by automated test strip           NE

GATIVE            

NEGATIVE        

 

                          Erythrocytes detection in urine sediment by light micr

oscopy           4+       

                                        NEGATIVE        

 

                    Urine ketones detection by automated test strip           1+

                  NEGATIVE

        

 

                    Urine nitrite detection by test strip           NEGATIVE    

        NEGATIVE    

    

 

                    Urine total bilirubin detection by test strip           NEGA

TIVE            

NEGATIVE        

 

                          Urine urobilinogen measurement by automated test strip

 (mass/volume)           

NORMAL                                  NORMAL        

 

                    Urine leukocyte esterase detection by dipstick           3+ 

                 NEGATIVE 

       

 

                                        Automated urine sediment erythrocyte cou

nt by microscopy (number/high power 

field)                     [HPF]                    NRG        

 

                                        Automated urine sediment leukocyte count

 by microscopy (number/high power field)

                          TNTC                      NRG        

 

                          Bacteria detection in urine sediment by light microsco

py           MODERATE     

                                        NRG        

 

                                        Squamous epithelial cells detection in u

rine sediment by light microscopy       

                          5-10                      NRG        

 

                          Crystals detection in urine sediment by light microsco

py           NONE         

                                        NRG        

 

                    Casts detection in urine sediment by light microscopy       

    NONE                

NRG        

 

                          Mucus detection in urine sediment by light microscopy 

          NEGATIVE        

                                        NRG        

 

                    Complete urinalysis with reflex to culture           YES    

             NRG        

 

                                        Bacterial urine culture - 10/06/19 15:05

         

 

                    Bacterial urine culture           3 OR MORE            NRG  

      

 

                    COLONY COUNT           40,000 CFU/ML            NRG        

 

                    FTX;REPORTABLE           (GRAM POSITIVE) SUGGESTING PROBABLE

            NRG     

   

 

                    FREE TEXT ENTRY 2           COLLECTION CONTAMINATION WITH SK

IN            NRG   

     

 

                    FREE TEXT ENTRY 3           CHLOE.  NO SUSCEPTIBILITY PERFOR

MED            NRG  

      

 

                                        Complete blood count (CBC) with automate

d white blood cell (WBC) differential - 

10/06/19 15:13         

 

                          Blood leukocytes automated count (number/volume)      

     11.4 10*3/uL         

                                        4.3-11.0        

 

                          Blood erythrocytes automated count (number/volume)    

       4.96 10*6/uL       

                                        4.35-5.85        

 

                    Venous blood hemoglobin measurement (mass/volume)           

15.3 g/dL           

11.5-16.0        

 

                    Blood hematocrit (volume fraction)           43 %           

     35-52        

 

                    Automated erythrocyte mean corpuscular volume           87 [

foz_us]           

80-99        

 

                                        Automated erythrocyte mean corpuscular h

emoglobin (mass per erythrocyte)        

                          31 pg                     25-34        

 

                                        Automated erythrocyte mean corpuscular h

emoglobin concentration measurement 

(mass/volume)             35 g/dL                   32-36        

 

                    Automated erythrocyte distribution width ratio           12.

5 %              10.0-

14.5        

 

                    Automated blood platelet count (count/volume)           326 

10*3/uL           

130-400        

 

                          Automated blood platelet mean volume measurement      

     10.7 [foz_us]        

                                        7.4-10.4        

 

                    Automated blood neutrophils/100 leukocytes           77 %   

             42-75       

 

 

                    Automated blood lymphocytes/100 leukocytes           14 %   

             12-44       

 

 

                    Blood monocytes/100 leukocytes           6 %                

 0-12        

 

                    Automated blood eosinophils/100 leukocytes           2 %    

             0-10        

 

                    Automated blood basophils/100 leukocytes           0 %      

           0-10        

 

                    Blood neutrophils automated count (number/volume)           

8.8 10*3            

1.8-7.8        

 

                    Blood lymphocytes automated count (number/volume)           

1.6 10*3            

1.0-4.0        

 

                    Blood monocytes automated count (number/volume)           0.

7 10*3            

0.0-1.0        

 

                    Automated eosinophil count           0.3 10*3/uL           0

.0-0.3        

 

                    Automated blood basophil count (count/volume)           0.0 

10*3/uL           

0.0-0.1        

 

                                        Comprehensive metabolic panel - 10/06/19

 15:13         

 

                          Serum or plasma sodium measurement (moles/volume)     

      145 mmol/L          

                                        135-145        

 

                          Serum or plasma potassium measurement (moles/volume)  

         3.8 mmol/L       

                                        3.6-5.0        

 

                          Serum or plasma chloride measurement (moles/volume)   

        106 mmol/L        

                                                

 

                    Carbon dioxide           26 mmol/L           21-32        

 

                          Serum or plasma anion gap determination (moles/volume)

           13 mmol/L      

                                        5-14        

 

                          Serum or plasma urea nitrogen measurement (mass/volume

)           8 mg/dL       

                                        7-18        

 

                          Serum or plasma creatinine measurement (mass/volume)  

         0.72 mg/dL       

                                        0.60-1.30        

 

                    Serum or plasma urea nitrogen/creatinine mass ratio         

  11                  NRG 

       

 

                                        Serum or plasma creatinine measurement w

ith calculation of estimated glomerular 

filtration rate           >                         NRG        

 

                    Serum or plasma glucose measurement (mass/volume)           

98 mg/dL            

        

 

                    Serum or plasma calcium measurement (mass/volume)           

9.8 mg/dL           

8.5-10.1        

 

                          Serum or plasma total bilirubin measurement (mass/volu

me)           0.6 mg/dL   

                                        0.1-1.0        

 

                                        Serum or plasma alkaline phosphatase elizabeth

surement (enzymatic activity/volume)    

                          50 U/L                            

 

                                        Serum or plasma aspartate aminotransfera

se measurement (enzymatic 

activity/volume)           17 U/L                    5-34        

 

                                        Serum or plasma alanine aminotransferase

 measurement (enzymatic activity/volume)

                          19 U/L                    0-55        

 

                    Serum or plasma protein measurement (mass/volume)           

7.2 g/dL            

6.4-8.2        

 

                    Serum or plasma albumin measurement (mass/volume)           

4.6 g/dL            

3.2-4.5        

 

                                        Complete blood count (CBC) with automate

d white blood cell (WBC) differential - 

19 18:37         

 

                          Blood leukocytes automated count (number/volume)      

     7.5 10*3/uL          

                                        4.3-11.0        

 

                          Blood erythrocytes automated count (number/volume)    

       4.76 10*6/uL       

                                        4.35-5.85        

 

                    Venous blood hemoglobin measurement (mass/volume)           

14.6 g/dL           

11.5-16.0        

 

                    Blood hematocrit (volume fraction)           42 %           

     35-52        

 

                    Automated erythrocyte mean corpuscular volume           87 [

foz_us]           

80-99        

 

                                        Automated erythrocyte mean corpuscular h

emoglobin (mass per erythrocyte)        

                          31 pg                     25-34        

 

                                        Automated erythrocyte mean corpuscular h

emoglobin concentration measurement 

(mass/volume)             35 g/dL                   32-36        

 

                    Automated erythrocyte distribution width ratio           12.

3 %              10.0-

14.5        

 

                    Automated blood platelet count (count/volume)           286 

10*3/uL           

130-400        

 

                          Automated blood platelet mean volume measurement      

     10.8 [foz_us]        

                                        7.4-10.4        

 

                    Automated blood neutrophils/100 leukocytes           55 %   

             42-75       

 

 

                    Automated blood lymphocytes/100 leukocytes           33 %   

             12-44       

 

 

                    Blood monocytes/100 leukocytes           9 %                

 0-12        

 

                    Automated blood eosinophils/100 leukocytes           3 %    

             0-10        

 

                    Automated blood basophils/100 leukocytes           1 %      

           0-10        

 

                    Blood neutrophils automated count (number/volume)           

4.1 10*3            

1.8-7.8        

 

                    Blood lymphocytes automated count (number/volume)           

2.5 10*3            

1.0-4.0        

 

                    Blood monocytes automated count (number/volume)           0.

6 10*3            

0.0-1.0        

 

                    Automated eosinophil count           0.2 10*3/uL           0

.0-0.3        

 

                    Automated blood basophil count (count/volume)           0.0 

10*3/uL           

0.0-0.1        

 

                                        Serum or plasma choriogonadotropin measu

rement (units/volume) - 19 18:37  

       

 

                          Serum or plasma choriogonadotropin measurement (units/

volume)           45956 

m[iU]/mL                                <5        

 

                                        Complete urinalysis with reflex to cultu

re - 19 19:15         

 

                    Urine color determination           YELLOW              NRG 

       

 

                    Urine clarity determination           CLEAR               NR

G        

 

                    Urine pH measurement by test strip           6.0            

     5-9        

 

                    Specific gravity of urine by test strip           >=        

          1.016-1.022     

   

 

                          Urine protein assay by test strip, semi-quantitative  

         NEGATIVE         

                                        NEGATIVE        

 

                    Urine glucose detection by automated test strip           NE

GATIVE            

NEGATIVE        

 

                          Erythrocytes detection in urine sediment by light micr

oscopy           3+       

                                        NEGATIVE        

 

                    Urine ketones detection by automated test strip           1+

                  NEGATIVE

        

 

                    Urine nitrite detection by test strip           NEGATIVE    

        NEGATIVE    

    

 

                    Urine total bilirubin detection by test strip           NEGA

TIVE            

NEGATIVE        

 

                          Urine urobilinogen measurement by automated test strip

 (mass/volume)           

0.2 mg/dL                               < = 1.0        

 

                    Urine leukocyte esterase detection by dipstick           NEG

ATIVE            

NEGATIVE        

 

                                        Automated urine sediment erythrocyte cou

nt by microscopy (number/high power 

field)                     [HPF]                    NRG        

 

                                        Automated urine sediment leukocyte count

 by microscopy (number/high power field)

                           [HPF]                    NRG        

 

                          Bacteria detection in urine sediment by light microsco

py           FEW          

                                        NRG        

 

                                        Squamous epithelial cells detection in u

rine sediment by light microscopy       

                          2-5                       NRG        

 

                          Crystals detection in urine sediment by light microsco

py           PRESENT      

                                        NRG        

 

                    Casts detection in urine sediment by light microscopy       

    NONE                

NRG        

 

                          Mucus detection in urine sediment by light microscopy 

          SMALL           

                                        NRG        

 

                    Complete urinalysis with reflex to culture           YES    

             NRG        

 

                                        Calcium oxalate crystals detection in ur

ine sediment by light microscopy        

                          FEW                       NRG        

 

                                        Bacterial urine culture - 19 19:15

         

 

                    Bacterial urine culture           NG                  NRG   

     



                                                                  



Encounters

      



                ACCT No.           Visit Date/Time           Discharge          

 Status         

             Pt. Type           Provider           Facility           Loc./Unit 

          

Complaint        

 

                    [id number]           2019 07:35:00            23:59:59        

                CLS             Outpatient           MARIA GUADALUPE KARIMI MD   

        [facility] 

[facility]           RAD                       VAGINAL BLEEDIN

G IN 

PREGNANCY        

 

                    [id number]           2019 18:23:00            20:27:00        

                DIS             Emergency           MARIA GUADALUPE KARIMI MD    

       [facility] 

[facility]           ER                        POSS 8 WKS PREG

/VAG BLEEDING

        

 

                    [id number]           10/06/2019 14:54:00           10/06/2

019 17:02:00        

                DIS             Emergency           ANGELA SHEARER           [facility]

 [facility]           ER                        LOW ABD PAIN        

 

                    [id number]           2018 10:30:00            23:59:59        

                CLS             Preadmit           RADHA DELA CRUZP           V

[facility]           RAD                       DUB, RLQ ABDOMINAL PAIN

        

 

                    [id number]           2018 14:18:00            17:58:00        

                DIS             Emergency           MATT BYNUM MD          

 [facility]           ER                        R OVARY PAIN;IRREGULAR 

BLEEDING     

   

 

                    [id number]           2018 17:53:00            19:23:00        

                DIS             Emergency           DOROTHY ESTRELLA           [facility]

 [facility]           ER                        HEALING GASH RIGHT LEG,

 SWELLING    

    

 

                    [id number]           2018 12:41:00           

018 13:11:00        

                DIS             Emergency           RACH LARA         

  [facility]           ER                        FALL/R LEG LAC        

 

                    [id number]           2018 16:46:00           

018 18:45:00        

                DIS             Emergency           ANGELA SHEARER           [facility]

 [facility]           ER                        5 WEEKS PREGNANT AND ST

ARTED 

BLEEDING        

 

                    [id number]           2018 06:18:00           

018 23:59:59        

                CLS             Outpatient           FENECH TC SCHROEDER S       

    [facility]           LAB                       LOW HCG LEVELS        

 

                    [id number]           2018 10:23:00           

018 12:15:00        

                DIS             Emergency           RACH LARA         

  [facility]           ER                        POSSIBLY PG,ABD CRAMPIN

G        

 

                    [id number]           2018 14:34:00           

018 23:59:59        

                CLS             Preadmit           THEA COOK DO      

     [facility] 

[facility]           REHAB                     POSTPARTUM BACK

 PAIN     

   

 

                    [id number]           2017 09:15:00           

017 13:35:00        

                DIS             Inpatient           NORMAN NEAL MD   

        [facility] 

[facility]           LDRP                      BREECH PRESENTA

TION       

 

 

                    [id number]           2015 18:14:00           

015 19:12:00        

                DIS             Emergency           RACH LARA         

  [facility]           ER                        LEFT HAND FINGER LAC;AB

D PAIN       

 

 

                    [id number]           2014 11:04:00           

014 13:14:00        

                DIS             Emergency           LALA MCCOY       

    [facility]           ER                        ABD PAIN        

 

             [id number]           2018 23:06:00                         

            

Document Registration                                                           

         

 

             [id number]           2018 15:18:00                         

            

Document Registration                                                           

         

 

             [id number]           2011 13:31:00                         

            

Document Registration                                                           

         

 

             [id number]           2011 13:25:00                         

            

Document Registration                                                           

         

 

             [id number]           2011 11:22:00                         

            

Document Registration                                                           

         

 

             [id number]           2011 00:50:00                         

            

Document Registration                                                           

         

 

             [id number]           2010 22:18:00                         

            

Document Registration

## 2019-12-29 NOTE — XMS REPORT
CCD document using C-CDA

                             Created on: 2019



Jeannie Skelton

External Reference #: [id number]

: 1995

Sex: Female



Demographics





                          Address                   [address] [place]  [address]

 

                          Home Phone                [phone]

 

                          Preferred Language        English

 

                          Marital Status            Never 

 

                          Confucianist Affiliation     Unknown

 

                          Race                      White

 

                          Ethnic Group              Not  or 





Author





                          Author                    Jeannie Marmolejo D.O.

 

                          Organization              [facility]

 

                          Address                   [address] [place]  [address]



 

                          Phone                     [phone]







Care Team Providers





                    Care Team Member Name Role                Phone

 

                          PP                        Unavailable

 

                          CCM                       Unavailable



                                            



Summary Purpose

          Interface Exchange                                                    
               



Insurance Providers

                      



                    Payer name                   Policy type / Coverage type    

               

Covered party ID                   Effective Begin Date                   

Effective End Date                

 

                          Blue Cross Blue Shield                   Blue Cross/Bl

ue Shield                 

                    [id number]                   Unknown                   Unk

[facility] MEDICAL ASSISTANCE PRO                   Blue C

ross/Blue Shield          

                    [id number]                   Unknown                   Unkn

own         

      



                                                                                
       



Family History

          Family History data not found                                         
                          



Social History

          No Social History data                                                
                   



Allergies, Adverse Reactions, Alerts

                      



                Substance                   Reaction                   Codes    

               

Entered Date                   Inactivated Date                   Status        

       

 

                                                            * NO KNOWN DRUG MIL

RGIES                                   

                                    Unknown                   2010        

           No 

Inactive Date                           Active                

 

                                        _                                       

                  

Unknown                   2010                   No Inactive Date         

                                        Active                

 

                                        _                                       

                  

Unknown                   2010                   No Inactive Date         

                                        Active                



                                                                                
                 



Past Medical History

                      



                    Illness                   Codes                   Condition 

Status              

                          Onset Date                   Resolved Date            

    

 

                                              Skin lesion                       

              ICD-9: 709.9

                    Active                   2012                   Unknow

n 

              

 

                                              SINUSITIS, ACUTE                  

                   ICD-9: 

461.9                   Active                   2011                   

Unknown                

 

                                              COUGH                             

        ICD-9: 786.2      

                    Active                   11/10/2011                   Unknow

n       

        

 

                                              DIARRHEA                          

           ICD-9: 787.91  

                    Active                   11/10/2011                   Unknow

n   

            

 

                                              PHARYNGITIS, ACUTE                

                     ICD-

9: 462                   Active                   11/10/2011                   

Unknown                

 

                                              Breast lump                       

              ICD-9: 

611.72                   Active                   2011                   

Unknown                

 

                                              ROUTINE MEDICAL EXAM              

                       

ICD-9: V70.0                   Active                    2011             

 

                                        Unknown                

 

                                              ABDOMINAL PAIN                    

                 ICD-9: 

789.00                   Active                   2010                   

Unknown                

 

                                              DYSPEPSIA                         

            ICD-9: 536.8  

                    Active                   2010                   Unknow

n   

            

 

                                              Toe fracture, left                

                     ICD-

9: 826.0                   Active                    2010                 

 

                                        Unknown                

 

                                                            ROUTINE CHILD HEALTH

 EXAM                                   

                    ICD-9: V20.2                   Active                   06/2

3/2010             

                                        Unknown                

 

                                              Allergic rhinitis                 

                    

Unknown                   Active                   2010                   

Unknown                

 

                                              Asthma                            

         Unknown          

                    Active                   2010                   Unknow

n           

    

 

                                              ACNE NEC                          

           ICD-9: 706.1   

                    Active                   2010                   Unknow

n    

           

 

                                              SEBORRHEIC DERMATITIS             

                        

ICD-9: 690.10                   Active                    2010            

 

                                        Unknown                



                                                                                
                                                                                
                                                                  



Problems

                      



                    Condition                   Codes                   Effectiv

e Dates             

                                        Condition Status                

 

                                              Skin lesion                       

              ICD-9: 709.9

                          2012                   Active                

 

                                              SINUSITIS, ACUTE                  

                   ICD-9: 

461.9                     2011                   Active                

 

                                              COUGH                             

        ICD-9: 786.2      

                          11/10/2011                   Active                

 

                                              DIARRHEA                          

           ICD-9: 787.91  

                          11/10/2011                   Active                

 

                                              PHARYNGITIS, ACUTE                

                     ICD-

9: 462                    11/10/2011                   Active                

 

                                              Breast lump                       

              ICD-9: 

611.72                    2011                   Active                

 

                                              ROUTINE MEDICAL EXAM              

                       

ICD-9: V70.0                   2011                   Active              

 

 

                                              ABDOMINAL PAIN                    

                 ICD-9: 

789.00                    2010                   Active                

 

                                              DYSPEPSIA                         

            ICD-9: 536.8  

                          2010                   Active                

 

                                              Toe fracture, left                

                     ICD-

9: 826.0                   2010                   Active                

 

                                                            ROUTINE CHILD HEALTH

 EXAM                                   

                    ICD-9: V20.2                   2010                   

Active             

  

 

                                              Allergic rhinitis                 

                    

Unknown                   2010                   Active                

 

                                              Asthma                            

         Unknown          

                          2010                   Active                

 

                                              ACNE NEC                          

           ICD-9: 706.1   

                          2010                   Active                

 

                                              SEBORRHEIC DERMATITIS             

                        

ICD-9: 690.10                   2010                   Active             

  



                                                                                
                                                                                
                                                                  



Medications

                      



                    Medication                   Codes                   Instruc

tions               

                    Start Date                   Stop Date                   Sta

tus              

                                        Fill Instructions                

 

                                                            ProAir HFA 90 mcg/ac

tuation Aerosol Inhaler                 

                          RxNorm: 4493556                   2 INH Q4-6H 4O4METTH

OB - 

INHALE TWO PUFFS EVERY 4 HOURS AS NEEDED FOR SHORTNESS OF BREATH                
                    2012                   No Stop Date                   

Active            

                                                        

 

                                                            Singulair 5 mg Chewa

ble Tab                                 

                    RxNorm: 344607                   1 Tablet(s) PO QD          

         

2012                   Inactive              

                                        CHEW AND SWALLOW ONE TABLET BY MOUTH SCOTT

 DAY                

 

                                              Minocin 100 mg Cap                

                     

RxNorm: 356063                   1 Capsule(s) PO BID                   

2012                   Inactive              

                                                        

 

                                              Minocin 100 mg Cap                

                     

RxNorm: 223356                   1 Capsule(s) PO BID                   

2012                   10/01/2012                   Inactive              

                                                        

 

                                                            Duac 1 %-5 % Topical

 Gel                                    

                          RxNorm: 431429                   TOP apply to affected

 area as directed         

                    2012                   No Stop Date                   

Active     

                                                        

 

                                                            ketoconazole 2 % Sha

mpoo                                    

                    RxNorm: 619963                   Application TOP            

       2012                   Inactive                   thre

e 

times a wk                

 

                                              Minocin 100 mg Cap                

                     

RxNorm: 884282                   1 Capsule(s) PO BID                   

2012                   Inactive              

                                                        

 

                                              Torres-Tab 250 mg Tab                

                     

RxNorm: 715868                   1 Tablet(s) PO QD                   2012                   Inactive                       

   

        

 

                                              mupirocin 2 % Ointment            

                         

RxNorm: 523463                   1 Application TOP BID                   

2012                   Inactive              

                                                        

 

                                                            Duac 1 %-5 % Topical

 Gel                                    

                    RxNorm: 652034                   TOP                   2012                   Inactive                       

            

 

                                              loratadine 10 mg Tab              

                       

RxNorm: 525732                   1 Tablet(s) PO QD                   2012                   Inactive                       

   

        

 

                                                            Lotrisone 1 %-0.05 %

 Topical Cream                          

                          RxNorm: 729606                   1 Application TOP BID

 for arm rash   

                    2012                   No Stop Date                   

Active                                                  

 

                                                            Tessalon Perles 100 

mg Cap                                  

                    RxNorm: 753047                   1 Capsule(s) PO TID        

           

2012                   Inactive              

                                                        

 

                                                            MetroCream 0.75 % To

pical                                   

                          RxNorm: 643934                   Application TOP BID f

or acne                  

                    2012                   In

active              

                                                        

 

                                                            Lotrisone 1 %-0.05 %

 Topical Cream                          

                          RxNorm: 076923                   1 Application TOP BID

                

                    2011                   No Stop Date                   

Active            

                                                        

 

                                                            Zithromax Z-Waldo 250 

mg Tab                                  

                    RxNorm: 741967                   Tablet(s) PO as directed   

                

2011                   Inactive              

                                        as directed                

 

                                              Imodium A-D 2 mg Tab              

                       

RxNorm: 622632                   1 Tablet(s) PO Q6-8H                   

11/10/2011                   2011                   Inactive              

                                                        

 

                                                            nystatin 100,000 uni

t/g Topical Cream                       

                          RxNorm: 112240                   1 Gram(s) TOP BID    

             

                    2011                   In

active             

                                                        

 

                                                            Singulair 5 mg Chewa

ble Tab                                 

                    RxNorm: 935970                   1 Tablet(s) PO QD          

         

2011                   Inactive              

                                        CHEW AND SWALLOW ONE TABLET BY MOUTH SCOTT

RY DAY                

 

                                              Claritin 10 mg Tab                

                     

RxNorm: 785771                   1 Tablet(s) PO QD                   2011                   Inactive                   TAKE

 

ONE TABLET BY MOUTH EVERY DAY                

 

                                                            ketoconazole 2 % [employer]

mpoo                                    

                          RxNorm: 447553                   Application TOP three

 times a wk               

                    2011                   In

active           

                                                        

 

                                              Minocin 100 mg Cap                

                     

RxNorm: 795185                   1 Capsule(s) PO BID                   

2011                   Inactive              

                                                        

 

                                                            ProAir HFA 90 mcg/ac

tuation Aerosol Inhaler                 

                          RxNorm: 1212878                   INH [id number] - INH

VISHAL TWO

PUFFS EVERY 4 HOURS AS NEEDED FOR SHORTNESS OF BREATH                   

2011                   Inactive              

                                                        

 

                                                            ProAir HFA 90 mcg/ac

tuation Aerosol Inhaler                 

                          RxNorm: 2432408                   INH [id number] - INH

VISHAL TWO

PUFFS EVERY 4 HOURS AS NEEDED FOR SHORTNESS OF BREATH                   

2010                   Inactive              

                                                        

 

                                                            ProAir HFA 90 mcg/Ac

tuation Aerosol Inhaler                 

                          RxNorm: 5415038                   HFA Aerosol Inhaler 

INH 

[id number] - INHALE TWO PUFFS EVERY 4 HOURS AS NEEDED FOR SHORTNESS OF BREATH   
                    2010                   

Inactive                                                

 

                                              Claritin 10 mg Tab                

                     

RxNorm: 873474                   1 Tablet(s) PO QD                   2010                   Inactive                       

   

        

 

                                                            Singulair 5 mg Chewa

ble Tab                                 

                    RxNorm: 059449                   Tablet(s) PO               

    2010                   Inactive                       

     

      

 

                                                            ketoconazole 2 % [employer]

mpoo                                    

                          RxNorm: 360595                   Application TOP three

 times a wk               

                    2010                   In

active           

                                                        

 

                                              Minocin 100 mg Cap                

                     

RxNorm: 683004                   1 Capsule(s) PO BID                   

2010                   Inactive              

                                                        

 

                                                            MetroCream 0.75 % To

pical                                   

                    RxNorm: 891770                   Application TOP BID        

           

2010                   Inactive              

                                                        

 

                                              azithromycin 250 mg Tab           

                          

RxNorm: 349833                          Tablet(s) PO 2 tablets on day one and on

e 

tablet on days 2-5                   No Start Date                              

                          Active                                    

 

                                              Singulair 10 mg Tab               

                      

RxNorm: 027132                   1 Tablet(s) PO QD                   No Start 

Date                                       Active                               

   

 

                                              Singulair 10 mg Tab               

                      

RxNorm: 392756                   Tablet(s) PO PRN                   No Start 

Date                   2010                   Inactive                    

              

 

                                              loratadine 10 mg Tab              

                       

RxNorm: 285309                   1 Tablet(s) PO QD                   No Start 

Date                   2012                   Inactive                    

              

 

                                              Claritin 10 mg Tab                

                     

RxNorm: 730852                   Tablet(s) PO PRN                   No Start 

Date                   2010                   Inactive                    

              

 

                                                            Lotrisone 1 %-0.05 %

 Topical Cream                          

                          RxNorm: 046426                   1 Application TOP BID

                

                    No Start Date                   2011                  

 Inactive         

                                                        

 

                                                            Duac 1 %-5 % Topical

 Gel                                    

                    RxNorm: 304224                   Topical                   N

o Start Date        

                    2012                   Inactive                       

          

 

 

                                                            Albuterol (Refill) 9

0 mcg/Actuation Aerosol Inhaler         

                           RxNorm: 5532089                   INH PRN            

                    No Start Date                   2010                  

 Inactive     

                                                        

 

                                                            ProAir HFA 90 mcg/Ac

tuation Aerosol Inhaler                 

                          RxNorm: 3744754                   2 Puff(s) INH Q4H pr

n 

shortness of breath                   No Start Date                   2010

                          Inactive                                   

 

                                                            Zithromax Z-Waldo 250 

mg Tab                                  

                    RxNorm: [id number]                   Tablet(s) PO               

    No Start Date 

                    2011                   Inactive                   as 

directed                



                                                                                
                                                                                
                                                                                
                                                                                
                                                                                
                                                               



Medication Administered

          No Medication Administered data                                       
                            



Immunizations

          No Immunization data                                                  
       



Assessments

                      



                    Condition                   Codes                   Effectiv

e Dates             

  

 

                    Skin lesion                   ICD-9: 709.9                  

 2012         

      

 

                    Acne                   ICD-9: 706.1                   2012                

 

                    SINUSITIS, ACUTE                   ICD-9: 461.9             

      2012    

           

 

                    PHARYNGITIS, ACUTE                   ICD-9: 462             

      2012    

           

 

                    COUGH                   ICD-9: 786.2                                  



 

                    ABDOMINAL PAIN                   ICD-9: 789.00              

     11/10/2011     

          

 

                    DIARRHEA                   ICD-9: 787.91                   1

1/10/2011           

    

 

                    Breast lump                   ICD-9: 611.72                 

  11/10/2011        

       

 

                    ROUTINE MEDICAL EXAM                   ICD-9: V70.0         

          2011

               

 

                    DYSPEPSIA                   ICD-9: 536.8                   0

2011           

    

 

                    Toe fracture, left                   ICD-9: 826.0           

        2010  

             

 

                    ROUTINE CHILD HEALTH EXAM                   ICD-9: V20.2    

               

2010                

 

                    SEBORRHEIC DERMATITIS                   ICD-9: 690.10       

            

2010                



                                                                    



Reason For Visit

                      



                    Reason For Visit                   Effective Dates          

         Notes      

         

 

                    breast complaint                   2012               

    pimply looking 

rash on both nipples                

 

                    sore throat                   2012                   d

rainage             

  

 

                    sore throat                   2011                    

               

 

                    cough                   11/10/2011                          

         

 

                    Yearly Checkup/Physical                   2011        

                    

      

 

                    follow up                   2011                   [facility]

[facility] f[facility]          

     

 

                    ~generic                   2010                   tend

er spot to left 

lower ribs, noticed the last two weeks, pain worse after eating                

 

                    follow up                   2010                      

             

 

                    Yearly Checkup/Physical                   2010        

                    

      

 

                    acne vulgaris                   2010                  

 ON DUAC/MINOCIN BUT

DOESN'T USE ROUTINELY                



                                                                              



Results

                      



                    Observation                   Observation Code              

     Item           

                    Item Code                   Result                   Date   

             

 

                    COMPREHENSIVE METABOLIC                   60020             

      AST           

                                        13 U/L                   11/10/2011     

           

 

                    COMPREHENSIVE METABOLIC                   56273             

      ALT           

                                        9 IU/L                   11/10/2011     

           

 

                    COMPREHENSIVE METABOLIC                   90976             

      BUN           

                                        10 MG/DL                   11/10/2011   

             

 

                    COMPREHENSIVE METABOLIC                   66939             

      ALBUMIN       

                                        4.7 GM/DL                   11/10/2011  

          

   

 

                    COMPREHENSIVE METABOLIC                   67487             

      CHLORIDE      

                                        102 MMOL/L                   11/10/2011 

         

     

 

                    COMPREHENSIVE METABOLIC                   21270             

      BILI TOT      

                                        0.5 MG/DL                   11/10/2011  

         

    

 

                    COMPREHENSIVE METABOLIC                   94564             

      ALK PHOS      

                                        79 U/L                   11/10/2011     

         

 

 

                    COMPREHENSIVE METABOLIC                   35930             

      SODIUM        

                                        137 MMOL/L                   11/10/2011 

           

   

 

                    COMPREHENSIVE METABOLIC                   28751             

      CREATININE    

                                        0.58 MG/DL                   11/10/2011 

       

       

 

                    COMPREHENSIVE METABOLIC                   39377             

      CALCIUM       

                                        10.3 MG/DL                   11/10/2011 

          

    

 

                    COMPREHENSIVE METABOLIC                   68213             

      POTASSIUM     

                                        4.1 MMOL/L                   11/10/2011 

        

      

 

                    COMPREHENSIVE METABOLIC                   97393             

      PROT TOT      

                                        7.2 GM/DL                   11/10/2011  

         

    

 

                    COMPREHENSIVE METABOLIC                   91424             

      Glucose       

                                        93 MG/DL                   11/10/2011   

          

  

 

                    COMPREHENSIVE METABOLIC                   45534             

      BICARB        

                                        27 MMOL/L                   11/10/2011  

           

  

 

                    COMPREHENSIVE METABOLIC                   78940             

      ANION GAP     

                                        8 MEQ/L                   11/10/2011    

        

   

 

                    BETA HCG- QUAL SERUM PREG TEST                   53406      

             HCG 

PREG                                       NEG                    11/10/2011    

 

          

 

                    COMPLETE BLOOD COUNT                   37137                

   WBC              

                                        7.7 10e9/L                   11/10/2011 

               

 

                    COMPLETE BLOOD COUNT                   01313                

   RBC              

                                        4.87 10e12/L                   11/10/201

1                

 

                    COMPLETE BLOOD COUNT                   09671                

   HGB              

                                        14.5 g/dL                   11/10/2011  

              

 

                    COMPLETE BLOOD COUNT                   63634                

   HCT DET          

                                        41.0 %                   11/10/2011     

           

 

                    COMPLETE BLOOD COUNT                   34184                

   MCV              

                                        84.2 fL                   11/10/2011    

            

 

                    COMPLETE BLOOD COUNT                   43479                

   MCH              

                                        29.8 pg                   11/10/2011    

            

 

                    COMPLETE BLOOD COUNT                   04666                

   MCHC             

                                        35.4 g/dL                   11/10/2011  

              

 

                    COMPLETE BLOOD COUNT                   22576                

   PLT              

                                        239 10e9/L                   11/10/2011 

               

 

                    COMPLETE BLOOD COUNT                   29535                

   MPV              

                                        10.5 fL                   11/10/2011    

            

 

                    COMPLETE BLOOD COUNT                   62315                

   KEDAR %            

                                        71.6 %                   11/10/2011     

           

 

                    COMPLETE BLOOD COUNT                   39015                

   LY %             

                                        17.5 %                   11/10/2011     

           

 

                    COMPLETE BLOOD COUNT                   27442                

   MON %            

                                        8.5 %                   11/10/2011      

          

 

                    COMPLETE BLOOD COUNT                   93416                

   EOS  %           

                                        2.1 %                   11/10/2011      

          

 

                    COMPLETE BLOOD COUNT                   00321                

   BASO %           

                                        0.3 %                   11/10/2011      

          

 

                    COMPLETE BLOOD COUNT                   15312                

   RDW              

                                        12.6 %                   11/10/2011     

           

 

                    COMPLETE BLOOD COUNT                   80759                

   ABS KEDAR          

                                        5.51 10e9/L                   11/10/2011

             

  

 

                    COMPLETE BLOOD COUNT                   15021                

   ABS LYMPH        

                                        1.35 10e9/L                   11/10/2011

           

    

 

                    COMPLETE BLOOD COUNT                   88291                

   ABS MONO         

                                        0.65 10e9/L                   11/10/2011

            

   

 

                    COMPLETE BLOOD COUNT                   52462                

   ABS EOS          

                                        0.16 10e9/L                   11/10/2011

             

  

 

                    COMPLETE BLOOD COUNT                   93307                

   ABS BASO         

                                        0.02 10e9/L                   11/10/2011

            

   

 

                    COMPLETE BLOOD COUNT                   19345                

   RDW-SD           

                                        37.9 fL                   11/10/2011    

            



                                                                                
                 



Review of Systems

                      



                    System                   Result                   Effective 

Dates               



 

                    Dermatologic                   rash                   2012                

 

                    Constitutional                   No fever                   

2012          

     

 

                    Gastrointestinal                   No diarrhea              

     2012     

          

 

                    Gastrointestinal                   No vomiting              

     2012     

          

 

                    Gastrointestinal                   No nausea                

   2012       

        

 

                    Ears/Nose/Throat/Neck                   No nasal discharge  

                 

2012                

 

                    Ears/Nose/Throat/Neck                   No otalgia          

         2012 

              

 

                    Ears/Nose/Throat/Neck                   No sore throat      

             

2012                

 

                    Respiratory                   No cough                                

  

 

                    Constitutional                   No fever                   

2012          

     

 

                    Ears/Nose/Throat/Neck                   nasal discharge     

              

2012                

 

                    Ears/Nose/Throat/Neck                   No otalgia          

         2012 

              

 

                    Ears/Nose/Throat/Neck                   sore throat         

          2012

               

 

                    Respiratory                   cough                   2012                

 

                    Respiratory                   No cigarette smoking          

         2012 

              

 

                    Gastrointestinal                   nausea                   

2012          

     

 

                    Gastrointestinal                   vomiting                 

  2012        

       

 

                    Dermatologic                   rash                   2012                

 

                    Constitutional                   No night sweats            

       2011   

            

 

                    Constitutional                   No fatigue                 

  2011        

       

 

                    Constitutional                   No fever                   

2011          

     

 

                    Constitutional                   No insomnia                

   2011       

        

 

                    Constitutional                   No weight loss             

      2011    

           

 

                    Ears/Nose/Throat/Neck                   sore throat         

          2011

               

 

                    Constitutional                   No fever                   

11/10/2011          

     

 

                    Ears/Nose/Throat/Neck                   nasal discharge     

              

11/10/2011                

 

                    Ears/Nose/Throat/Neck                   sore throat         

          11/10/2011

               

 

                    Respiratory                   asthma                   11/10

/2011               



 

                    Respiratory                   chest congestion              

     11/10/2011     

          

 

                    Respiratory                   cough                   11/10/

2011                

 

                    Gastrointestinal                   abdominal pain           

        11/10/2011  

             

 

                    Gastrointestinal                   No anorexia              

     11/10/2011     

          

 

                    Gastrointestinal                   diarrhea                 

  11/10/2011        

       

 

                    Genitourinary/Nephrology                   No dysuria       

            

11/10/2011                

 

                    Musculoskeletal                   No stiffness              

     11/10/2011     

          

 

                    Musculoskeletal                   No swelling               

    11/10/2011      

         

 

                    Dermatologic                   No rash                   11/

10/2011             

  

 

                    Dermatologic                   No sores                   11

/10/2011            

   

 

                    Dermatologic                   No hair loss                 

  11/10/2011        

       

 

                          Hematologic/Lymphatic                   No abnormal ec

chymoses                  

                                        11/10/2011                

 

                    Cardiovascular                   No fatigue                 

  11/10/2011        

       

 

                    Ears/Nose/Throat/Neck                   No dizziness        

           

11/10/2011                

 

                    Constitutional                   No fever                   

2011          

     

 

                    Constitutional                   No recent illness          

         2011 

              

 

                    Ears/Nose/Throat/Neck                   No headache         

          2011

               

 

                    Ears/Nose/Throat/Neck                   No otalgia          

         2011 

              

 

                    Ears/Nose/Throat/Neck                   No sore throat      

             

2011                

 

                    Cardiovascular                   No cardiac murmur          

         2011 

              

 

                    Cardiovascular                   No arrhythmia              

     2011     

          

 

                    Cardiovascular                   No near-syncope/dizziness  

                 

2011                

 

                    Cardiovascular                   No syncope                 

  2011        

       

 

                    Cardiovascular                   No hypertension            

       2011   

            

 

                    Respiratory                   asthma                                  



 

                    Gastrointestinal                   No nausea                

   2011       

        

 

                    Gastrointestinal                   No vomiting              

     2011     

          

 

                    Gastrointestinal                   No diarrhea              

     2011     

          

 

                    Gastrointestinal                   No constipation          

         2011 

              

 

                          Genitourinary/Nephrology                   No menstrua

l irregularity            

                                        2011                

 

                    Musculoskeletal                   No bone fracture          

         2011 

              

 

                    Musculoskeletal                   No low back pain          

         2011 

              

 

                    Musculoskeletal                   No shoulder pain          

         2011 

              

 

                    Musculoskeletal                   No bone pain              

     2011     

          

 

                    Dermatologic                   No rash                   2011             

  

 

                    Dermatologic                   No sores                               

   

 

                    Eyes                   No vision change                               

   

 

                    Gastrointestinal                   No hemorrhoids           

        2011  

             

 

                    Gastrointestinal                   No hepatitis             

      2011    

           

 

                    Gastrointestinal                   No abdominal pain        

           

2011                

 

                    Gastrointestinal                   No constipation          

         2011 

              

 

                    Gastrointestinal                   No diarrhea              

     2011     

          

 

                    Gastrointestinal                   No gastroesophageal reflu

x                   

2011                

 

                    Gastrointestinal                   No melena                

   2011       

        

 

                    Gastrointestinal                   No nausea                

   2011       

        

 

                    Gastrointestinal                   No vomiting              

     2011     

          

 

                    Gastrointestinal                   abdominal pain           

        2010  

             

 

                    Musculoskeletal                   arthralgia(s)             

      2010    

           

 

                    Gastrointestinal                   dyspepsia                

   2010       

        

 

                    Gastrointestinal                   nausea                   

2010          

     

 

                    Constitutional                   No fever                   

2010          

     

 

                    Constitutional                   No recent illness          

         2010 

              

 

                    Musculoskeletal                   bone pain                 

  2010        

       

 

                    Cardiovascular                   No cardiac murmur          

         2010 

              

 

                    Cardiovascular                   No arrhythmia              

     2010     

          

 

                    Cardiovascular                   No syncope                 

  2010        

       

 

                    Dermatologic                   No rash                                

  

 

                    Dermatologic                   No sores                               

   

 

                    Dermatologic                   ecchymosis                   

2010          

     

 

                    Ears/Nose/Throat/Neck                   nasal allergies     

              

2010                

 

                    Respiratory                   asthma                                  



 

                    Dermatologic                   acne vulgaris                

   2010       

        

 

                    Constitutional                   No fever                   

2010          

     

 

                    Constitutional                   No fatigue                 

  2010        

       

 

                    Ears/Nose/Throat/Neck                   nasal allergies     

              

2010                

 

                    Dermatologic                   dermatitis - seborrheic      

             

2010                



                                                                    



Physical Exam

                      



                    Exam Name                   System Name                   It

em Name             

                    Status                   Result                   Effective 

Dates          

                                        Notes                

 

                    Full Exam - General                   Constitutional        

            general 

appearance                   Overall:                   well developed          

                          2012                   None                

 

                    Full Exam - General                   Constitutional        

            general 

appearance                   Overall:                   well nourished          

                          2012                   None                

 

                    Full Exam - General                   Constitutional        

            general 

appearance                   Overall:                   in no acute distress    

                          2012                   None                

 

                    Full Exam - General                   Chest/Breast          

         

breast/chest inspection                   Skin appearance:                   a 

normal exam                   2012                   pt has circular 

pattern of areaolar ducts.  One is enlarged on left breast.                  

 

                    Full Exam - General                   Chest/Breast          

         

breast/chest inspection                   Chest shape:                   a 

normal exam                   2012                   None                

 

                    Full Exam - General                   Respiratory           

        auscultation

                          Overall:                   breath sounds clear bilater

ally    

                          2012                   None                

 

                    Full Exam - General                   Respiratory           

        respiratory 

effort/rhythm                   Overall:                   no retractions       

                          2012                   None                

 

                    Full Exam - General                   Respiratory           

        respiratory 

effort/rhythm                   Overall:                   normal rate          

                          2012                   None                

 

                    Full Exam - General                   Cardiovascular        

           

auscultation of heart                   Overall:                   regular rate 

                          2012                   None                

 

                    Full Exam - General                   Cardiovascular        

           

auscultation of heart                   Overall:                   regular rate 

                          2012                   None                

 

                    Full Exam - General                   Cardiovascular        

           

auscultation of heart                   Overall:                   normal heart 

sounds                    2012                   None                

 

                    Full Exam - General                   Lymphatic             

      neck nodes    

                          Overall:                   anterior cervical chain jamshid

ign         

                          2012                   None                

 

                    Full Exam - General                   Lymphatic             

      neck nodes    

                          Overall:                   posterior cervical chain be

nign        

                          2012                   None                

 

                    Full Exam - General                   Psychiatric           

        

orientation/consciousness                   Overall:                   oriented 

to person, place and time                   2012                   None   

            

 

                    Full Exam - General                   Constitutional        

            general 

appearance                   Overall:                   well nourished          

                          2012                   None                

 

                    Full Exam - General                   Constitutional        

            general 

appearance                   Overall:                   well developed          

                          2012                   None                

 

                    Full Exam - General                   Constitutional        

            general 

appearance                   Overall:                   in no acute distress    

                          2012                   None                

 

                    Full Exam - General                   Ears/Nose/Throat      

             

otoscopic exam                   Overall:                   external auditory 

canals clear                   2012                   None                

 

                    Full Exam - General                   Ears/Nose/Throat      

             

otoscopic exam                   Overall:                   tympanic membranes 

clear                     2012                   None                

 

                    Full Exam - General                   Ears/Nose/Throat      

             

lips/teeth/gingiva                   Overall:                   benign lips     

                          2012                   None                

 

                    Full Exam - General                   Ears/Nose/Throat      

             

lips/teeth/gingiva                   Overall:                   normal dentition

                          2012                   None                

 

                    Full Exam - General                   Ears/Nose/Throat      

             

lips/teeth/gingiva                   Overall:                   benign gingiva  

                          2012                   None                

 

                    Full Exam - General                   Ears/Nose/Throat      

             oral 

cavity/pharynx/larynx                    Oropharynx:                   erythema 

                          2012                   mild                

 

                    Full Exam - General                   Respiratory           

        auscultation

                          Overall:                   breath sounds clear bilater

ally    

                          2012                   None                

 

                    Full Exam - General                   Respiratory           

        respiratory 

effort/rhythm                   Overall:                   no retractions       

                          2012                   None                

 

                    Full Exam - General                   Respiratory           

        respiratory 

effort/rhythm                   Overall:                   normal rate          

                          2012                   occassional cough        

        

 

                    Full Exam - General                   Constitutional        

            general 

appearance                   Overall:                   in no acute distress    

                          2011                   None                

 

                    Full Exam - General                   Constitutional        

            general 

appearance                   Overall:                   well developed          

                          2011                   None                

 

                    Full Exam - General                   Constitutional        

            general 

appearance                   Overall:                   well nourished          

                          2011                   None                

 

                    Full Exam - General                   Ears/Nose/Throat      

             

otoscopic exam                   Overall:                   external auditory 

canals clear                   2011                   None                

 

                    Full Exam - General                   Ears/Nose/Throat      

             

otoscopic exam                   Overall:                   tympanic membranes 

clear                     2011                   None                

 

                    Full Exam - General                   Ears/Nose/Throat      

             

internal nose                   Turbinates:                   hypertrophy       

                          2011                   None                

 

                    Full Exam - General                   Ears/Nose/Throat      

             

internal nose                   Turbinates:                   erythema          

                          2011                   None                

 

                    Full Exam - General                   Ears/Nose/Throat      

             oral 

cavity/pharynx/larynx                    Oropharynx:                   erythema 

                          2011                   None                

 

                    Full Exam - General                   Neck                  

 inspection of neck 

                    Overall:                   normal size                   

2011                              None                

 

                    Full Exam - General                   Neck                  

 inspection of neck 

                    Overall:                   no masses                   

2011                              None                

 

                    Full Exam - General                   Respiratory           

        auscultation

                          Right upper lung field:                   a normal exa

m       

                          2011                   None                

 

                    Full Exam - General                   Respiratory           

        auscultation

                          Right middle lung field:                   a normal ex

am      

                          2011                   None                

 

                    Full Exam - General                   Respiratory           

        auscultation

                          Right lower lung field:                   a normal exa

m       

                          2011                   None                

 

                    Full Exam - General                   Respiratory           

        auscultation

                          Left lower lung field:                   a normal exam

        

                          2011                   None                

 

                    Full Exam - General                   Respiratory           

        auscultation

                          Overall:                   breath sounds clear bilater

ally    

                          2011                   None                

 

                    Full Exam - General                   Respiratory           

        auscultation

                          Left upper lung field:                   a normal exam

        

                          2011                   None                

 

                    Full Exam - General                   Respiratory           

        auscultation

                    Diffuse:                   a normal exam                   

2011                              None                

 

                    Full Exam - General                   Cardiovascular        

           

auscultation of heart                   Overall:                   no murmurs   

                          2011                   None                

 

                    Full Exam - General                   Cardiovascular        

           

auscultation of heart                   Overall:                   regular rate 

                          2011                   None                

 

                    Full Exam - General                   Cardiovascular        

           

auscultation of heart                   Overall:                   normal heart 

sounds                    2011                   None                

 

                    Full Exam - General                   Cardiovascular        

           

auscultation of heart                   S1:                       a normal exam 

    

                          2011                   None                

 

                    Full Exam - General                   Cardiovascular        

           

auscultation of heart                   S2:                       a normal exam 

    

                          2011                   None                

 

                    Full Exam - General                   Cardiovascular        

           

auscultation of heart                   Rhythm:                   regular rhythm

                          2011                   None                

 

                    Full Exam - General                   Cardiovascular        

           

auscultation of heart                   Rate:                     regular rate  

  

                          2011                   None                

 

                    Full Exam - General                   Cardiovascular        

           

auscultation of heart                   S3 (ventricular gallop):                

                    present                   2011                   None 

               

 

                    Full Exam - General                   Neurologic            

       mental status

                    Overall:                   alert                   

1 

                                        None                

 

                    Full Exam - General                   Neurologic            

       mental status

                    Overall:                   oriented                   

2011                              None                

 

                    Full Exam - General                   Psychiatric           

        mood and 

affect                    Overall:                   normal mood and affect     

 

                          2011                   None                

 

                    Full Exam - General                   Constitutional        

            general 

appearance                   Overall:                   in no acute distress    

                          11/10/2011                   None                

 

                    Full Exam - General                   Ears/Nose/Throat      

             

otoscopic exam                   Overall:                   external auditory 

canals clear                   11/10/2011                   None                

 

                    Full Exam - General                   Ears/Nose/Throat      

             

otoscopic exam                   Overall:                   tympanic membranes 

clear                     11/10/2011                   None                

 

                    Full Exam - General                   Ears/Nose/Throat      

             

lips/teeth/gingiva                   Overall:                   benign lips     

                          11/10/2011                   None                

 

                    Full Exam - General                   Ears/Nose/Throat      

             

lips/teeth/gingiva                   Overall:                   normal dentition

                          11/10/2011                   None                

 

                    Full Exam - General                   Ears/Nose/Throat      

             

lips/teeth/gingiva                   Overall:                   benign gingiva  

                          11/10/2011                   None                

 

                    Full Exam - General                   Ears/Nose/Throat      

             oral 

cavity/pharynx/larynx                    Oropharynx:                   erythema 

                          11/10/2011                   mild                

 

                    Full Exam - General                   Respiratory           

        auscultation

                          Overall:                   breath sounds clear bilater

ally    

                          11/10/2011                   None                

 

                    Full Exam - General                   Respiratory           

        respiratory 

effort/rhythm                   Overall:                   no retractions       

                          11/10/2011                   None                

 

                    Full Exam - General                   Respiratory           

        respiratory 

effort/rhythm                   Overall:                   normal rate          

                          11/10/2011                   None                

 

                    Full Exam - General                   Cardiovascular        

           

auscultation of heart                   Overall:                   regular rate 

                          11/10/2011                   None                

 

                    Full Exam - General                   Cardiovascular        

           

auscultation of heart                   Overall:                   normal heart 

sounds                    11/10/2011                   None                

 

                    Full Exam - General                   Lymphatic             

      neck nodes    

                          Left anterior cervical chain:                   number

 of palpable

nodes: 1                   11/10/2011                   None                

 

                    Full Exam - General                   Lymphatic             

      neck nodes    

                          Left anterior cervical chain:                   size (

cm): 1      

                          11/10/2011                   None                

 

                    Full Exam - General                   Chest/Breast          

         breast and 

axillae palpation                       Left upper outer quadrant:              

    

                    tender                   11/10/2011                   None  

              

 

                    Full Exam - General                   Chest/Breast          

         breast and 

axillae palpation                       Left upper outer quadrant:              

    

                    nodular                   11/10/2011                   None 

               

 

                    Full Exam - General                   Constitutional        

            general 

appearance                   Overall:                   well nourished          

                          11/10/2011                   None                

 

                    Full Exam - General                   Constitutional        

            general 

appearance                   Overall:                   well developed          

                          11/10/2011                   None                

 

                    Full Exam - General                   Lymphatic             

      neck nodes    

                          Left anterior cervical chain:                   tender

            

                          11/10/2011                   None                

 

                    Full Exam - General                   Lymphatic             

      neck nodes    

                          Left anterior cervical chain:                   mobile

            

                          11/10/2011                   None                

 

                    Full Exam - General                   Lymphatic             

      neck nodes    

                          Right anterior cervical chain:                   numbe

r of 

palpable nodes: 1                   11/10/2011                   None           

    

 

                    Full Exam - General                   Lymphatic             

      neck nodes    

                          Right anterior cervical chain:                   size 

(cm): 1     

                          11/10/2011                   None                

 

                    Full Exam - General                   Lymphatic             

      neck nodes    

                          Right anterior cervical chain:                   tende

r           

                          11/10/2011                   1                

 

                    Full Exam - General                   Lymphatic             

      neck nodes    

                          Right anterior cervical chain:                   mobil

e           

                          11/10/2011                   None                

 

                    Full Exam - General                   Constitutional        

            general 

appearance                   Overall:                   well nourished          

                          2011                   None                

 

                    Full Exam - General                   Constitutional        

            general 

appearance                   Overall:                   well developed          

                          2011                   None                

 

                    Full Exam - General                   Constitutional        

            general 

appearance                   Overall:                   in no acute distress    

                          2011                   None                

 

                    Full Exam - General                   Eyes                  

 conjunctiva/eyelids

                          Overall:                   conjunctiva clear          

        

                          2011                   None                

 

                    Full Exam - General                   Eyes                  

 conjunctiva/eyelids

                    Overall:                   cornea clear                   

2011                              None                

 

                    Full Exam - General                   Eyes                  

 conjunctiva/eyelids

                    Overall:                   eyelids normal                   

2011                              None                

 

                    Full Exam - General                   Eyes                  

 pupils and irises  

                          Overall:                   pupils equal, round, reacti

ve to 

light and accomodation                   2011                   None      

         

 

                    Full Exam - General                   Ears/Nose/Throat      

             

external ear                   Overall:                   normal appearance     

                          2011                   None                

 

                    Full Exam - General                   Ears/Nose/Throat      

             

otoscopic exam                   Overall:                   external auditory 

canals clear                   2011                   None                

 

                    Full Exam - General                   Ears/Nose/Throat      

             

otoscopic exam                   Overall:                   tympanic membranes 

clear                     2011                   None                

 

                    Full Exam - General                   Ears/Nose/Throat      

             

lips/teeth/gingiva                   Overall:                   benign lips     

                          2011                   None                

 

                    Full Exam - General                   Ears/Nose/Throat      

             

lips/teeth/gingiva                   Overall:                   normal dentition

                          2011                   None                

 

                    Full Exam - General                   Ears/Nose/Throat      

             

lips/teeth/gingiva                   Overall:                   benign gingiva  

                          2011                   None                

 

                    Full Exam - General                   Ears/Nose/Throat      

             oral 

cavity/pharynx/larynx                    Overall:                   oral mucosa 

clear                     2011                   None                

 

                    Full Exam - General                   Ears/Nose/Throat      

             oral 

cavity/pharynx/larynx                    Overall:                   tonsils 

benign                    2011                   None                

 

                    Full Exam - General                   Ears/Nose/Throat      

             oral 

cavity/pharynx/larynx                    Overall:                   

oropharyngeal mucosa clear                   2011                   None  

             

 

                    Full Exam - General                   Neck                  

 thyroid            

                    Overall:                   normal size                   2011       

                                        None                

 

                    Full Exam - General                   Neck                  

 thyroid            

                    Overall:                   normal consistency               

    2011

                                        None                

 

                    Full Exam - General                   Neck                  

 thyroid            

                    Overall:                   nontender                            

                                        None                

 

                    Full Exam - General                   Respiratory           

        auscultation

                          Overall:                   breath sounds clear bilater

ally    

                          2011                   None                

 

                    Full Exam - General                   Respiratory           

        respiratory 

effort/rhythm                   Overall:                   no retractions       

                          2011                   None                

 

                    Full Exam - General                   Respiratory           

        respiratory 

effort/rhythm                   Overall:                   normal rate          

                          2011                   None                

 

                    Full Exam - General                   Cardiovascular        

           

auscultation of heart                   Overall:                   regular rate 

                          2011                   None                

 

                    Full Exam - General                   Cardiovascular        

           

auscultation of heart                   Overall:                   normal heart 

sounds                    2011                   None                

 

                    Full Exam - General                   Chest/Breast          

         breast and 

axillae palpation                       Right upper inner quadrant:             

    

                    mass present                   2011                   

None               



 

                    Full Exam - General                   Chest/Breast          

         breast and 

axillae palpation                       Right upper inner quadrant:             

    

                    tender                   2011                   None  

              

 

                    Full Exam - General                   Chest/Breast          

         breast and 

axillae palpation                       Right upper inner quadrant:             

    

                    size (cm): 2                   2011                   

None               



 

                    Full Exam - General                   Abdomen               

    abdominal exam  

                    Overall:                   no tenderness                   

2011                              None                

 

                    Full Exam - General                   Abdomen               

    abdominal exam  

                    Overall:                   soft                   2011

    

                                        None                

 

                    Full Exam - General                   Abdomen               

    abdominal exam  

                    Overall:                   no masses                   

2011                              None                

 

                    Full Exam - General                   Abdomen               

    abdominal exam  

                          Overall:                   normal bowel sounds        

          

                          2011                   None                

 

                    Full Exam - General                   Musculoskeletal       

            left 

upper extremity                   Overall:                   normal left 

shoulder                   2011                   None                

 

                    Full Exam - General                   Musculoskeletal       

            left 

upper extremity                   Overall:                   normal left elbow  

                          2011                   None                

 

                    Full Exam - General                   Musculoskeletal       

            left 

upper extremity                   Overall:                   normal left wrist  

                          2011                   None                

 

                    Full Exam - General                   Musculoskeletal       

            left 

upper extremity                   Overall:                   full strength in 

LUE                       2011                   None                

 

                    Full Exam - General                   Musculoskeletal       

            left 

upper extremity                   Overall:                   normal LUE bulk and

tone                      2011                   None                

 

                    Full Exam - General                   Musculoskeletal       

            right 

upper extremity                   Overall:                   right shoulder 

benign                    2011                   None                

 

                    Full Exam - General                   Musculoskeletal       

            right 

upper extremity                   Overall:                   right elbow benign 

                          2011                   None                

 

                    Full Exam - General                   Musculoskeletal       

            right 

upper extremity                   Overall:                   right wrist benign 

                          2011                   None                

 

                    Full Exam - General                   Musculoskeletal       

            right 

upper extremity                   Overall:                   normal RUE bulk and

tone                      2011                   None                

 

                    Full Exam - General                   Musculoskeletal       

            right 

upper extremity                   Overall:                   full strength in 

RUE                       2011                   None                

 

                    Full Exam - General                   Musculoskeletal       

            right 

lower extremity                   Overall:                   right knee benign  

                          2011                   None                

 

                    Full Exam - General                   Musculoskeletal       

            right 

lower extremity                   Overall:                   right ankle benign 

                          2011                   None                

 

                    Full Exam - General                   Musculoskeletal       

            right 

lower extremity                   Overall:                   right foot benign  

                          2011                   None                

 

                    Full Exam - General                   Musculoskeletal       

            right 

lower extremity                   Overall:                   full strength in 

RLE                       2011                   None                

 

                    Full Exam - General                   Musculoskeletal       

            right 

lower extremity                   Overall:                   normal RLE bulk and

tone                      2011                   None                

 

                    Full Exam - General                   Musculoskeletal       

            left 

lower extremity                   Overall:                   left knee benign   

                          2011                   None                

 

                    Full Exam - General                   Musculoskeletal       

            left 

lower extremity                   Overall:                   left ankle benign  

                          2011                   None                

 

                    Full Exam - General                   Musculoskeletal       

            left 

lower extremity                   Overall:                   left foot benign   

                          2011                   None                

 

                    Full Exam - General                   Musculoskeletal       

            left 

lower extremity                   Overall:                   full strength in 

LLE                       2011                   None                

 

                    Full Exam - General                   Musculoskeletal       

            left 

lower extremity                   Overall:                   normal LLE bulk and

tone                      2011                   None                

 

                    Full Exam - General                   Musculoskeletal       

            spine, 

ribs and pelvis                   Overall:                   good posture       

                          2011                   None                

 

                    Full Exam - General                   Musculoskeletal       

            spine, 

ribs and pelvis                   Overall:                   spine benign       

                          2011                   None                

 

                    Full Exam - General                   Musculoskeletal       

            spine, 

ribs and pelvis                   Overall:                   sacroiliac joint 

benign                    2011                   None                

 

                    Full Exam - General                   Musculoskeletal       

            spine, 

ribs and pelvis                   Overall:                   right hip benign   

                          2011                   pops with external rotati

on         

      

 

                    Full Exam - General                   Musculoskeletal       

            spine, 

ribs and pelvis                   Overall:                   left hip benign    

                          2011                   pops with external rotati

on          

     

 

                    Full Exam - General                   Musculoskeletal       

            gait and

station                   Overall:                   normal gait                

                          2011                   None                

 

                    Full Exam - General                   Musculoskeletal       

            gait and

station                   Overall:                   normal station             

                          2011                   None                

 

                    Full Exam - General                   Integument            

       inspection of

skin                      Overall:                   no rash, lesions           

   

                          2011                   None                

 

                    Full Exam - General                   Psychiatric           

        

orientation/consciousness                   Overall:                   oriented 

to person, place and time                   2011                   None   

            

 

                    Full Exam - General                   Constitutional        

            general 

appearance                   Overall:                   well nourished          

                          2011                   None                

 

                    Full Exam - General                   Constitutional        

            general 

appearance                   Overall:                   well developed          

                          2011                   None                

 

                    Full Exam - General                   Constitutional        

            general 

appearance                   Overall:                   in no acute distress    

                          2011                   None                

 

                    Full Exam - General                   Neurologic            

       mental status

                    Overall:                   alert                   

1 

                                        None                

 

                    Full Exam - General                   Neurologic            

       mental status

                    Overall:                   oriented                   

2011                              None                

 

                    Full Exam - General                   Psychiatric           

        mood and 

affect                    Overall:                   normal mood and affect     

 

                          2011                   None                

 

                    Full Exam - General                   Abdomen               

    abdominal exam  

                    Overall:                   no masses                   

2011                              None                

 

                    Full Exam - General                   Abdomen               

    abdominal exam  

                    Overall:                   no tenderness                   

2011                              None                

 

                    Full Exam - General                   Abdomen               

    abdominal exam  

                          Overall:                   normal bowel sounds        

          

                          2011                   None                

 

                    Full Exam - General                   Abdomen               

    abdominal exam  

                    Overall:                   soft                   2011

    

                                        None                

 

                    Full Exam - General                   Constitutional        

            general 

appearance                   Overall:                   well nourished          

                          2010                   None                

 

                    Full Exam - General                   Constitutional        

            general 

appearance                   Overall:                   well developed          

                          2010                   None                

 

                    Full Exam - General                   Constitutional        

            general 

appearance                   Overall:                   in no acute distress    

                          2010                   None                

 

                    Full Exam - General                   Neurologic            

       mental status

                    Overall:                   alert                   

0 

                                        None                

 

                    Full Exam - General                   Neurologic            

       mental status

                    Overall:                   oriented                   

2010                              None                

 

                    Full Exam - General                   Psychiatric           

        mood and 

affect                    Overall:                   normal mood and affect     

 

                          2010                   None                

 

                    Full Exam - General                   Respiratory           

        auscultation

                          Overall:                   breath sounds clear bilater

ally    

                          2010                   None                

 

                    Full Exam - General                   Respiratory           

        auscultation

                    Diffuse:                   a normal exam                   

2010                              None                

 

                    Full Exam - General                   Respiratory           

        auscultation

                          Left upper lung field:                   a normal exam

        

                          2010                   None                

 

                    Full Exam - General                   Respiratory           

        auscultation

                          Left lower lung field:                   a normal exam

        

                          2010                   None                

 

                    Full Exam - General                   Respiratory           

        auscultation

                          Right upper lung field:                   a normal exa

m       

                          2010                   None                

 

                    Full Exam - General                   Respiratory           

        auscultation

                          Right middle lung field:                   a normal ex

am      

                          2010                   None                

 

                    Full Exam - General                   Respiratory           

        auscultation

                          Right lower lung field:                   a normal exa

m       

                          2010                   None                

 

                    Full Exam - General                   Cardiovascular        

           

auscultation of heart                   Overall:                   regular rate 

                          2010                   None                

 

                    Full Exam - General                   Cardiovascular        

           

auscultation of heart                   Overall:                   normal heart 

sounds                    2010                   None                

 

                    Full Exam - General                   Cardiovascular        

           

auscultation of heart                   Overall:                   no murmurs   

                          2010                   None                

 

                    Full Exam - General                   Cardiovascular        

           

auscultation of heart                   Rate:                     regular rate  

  

                          2010                   None                

 

                    Full Exam - General                   Cardiovascular        

           

auscultation of heart                   Rhythm:                   regular rhythm

                          2010                   None                

 

                    Full Exam - General                   Cardiovascular        

           

auscultation of heart                   S1:                       a normal exam 

    

                          2010                   None                

 

                    Full Exam - General                   Cardiovascular        

           

auscultation of heart                   S2:                       a normal exam 

    

                          2010                   None                

 

                    Full Exam - General                   Abdomen               

    abdominal exam  

                    Overall:                   no masses                   

2010                              None                

 

                    Full Exam - General                   Abdomen               

    abdominal exam  

                          Overall:                   normal bowel sounds        

          

                          2010                   None                

 

                    Full Exam - General                   Abdomen               

    abdominal exam  

                    Overall:                   soft                   2010

    

                                        None                

 

                    Full Exam - General                   Abdomen               

    abdominal exam  

                          Epigastric:                   tender to palpation     

          

                          2010                   None                

 

                    Full Exam - General                   Abdomen               

    abdominal exam  

                          Left upper quadrant:                   tender to palpa

tion      

                          2010                   None                

 

                    Full Exam - General                   Musculoskeletal       

            left 

lower extremity                   Inspection - left foot:                   

redness                   2010                   5th toe-previously 

diagnosed slightly displaced fracture. (5th digit)                

 

                    Full Exam - General                   Musculoskeletal       

            left 

lower extremity                   Palpation - left foot:                   

swelling                   2010                   None                

 

                    Full Exam - General                   Musculoskeletal       

            left 

lower extremity                   Palpation - left foot:                   

tender                    2010                   None                

 

                    Full Exam - General                   Musculoskeletal       

            left 

lower extremity                   ROM - left foot:                   pain with 

ROM                       2010                   None                

 

                    Full Exam - General                   Psychiatric           

        

orientation/consciousness                   Overall:                   oriented 

to person, place and time                   2010                   None   

            

 

                    Full Exam - General                   Constitutional        

            general 

appearance                   Overall:                   well nourished          

                          2010                   None                

 

                    Full Exam - General                   Constitutional        

            general 

appearance                   Overall:                   well developed          

                          2010                   None                

 

                    Full Exam - General                   Constitutional        

            general 

appearance                   Overall:                   in no acute distress    

                          2010                   None                

 

                    Full Exam - General                   Respiratory           

        auscultation

                          Overall:                   breath sounds clear bilater

ally    

                          2010                   None                

 

                    Full Exam - General                   Respiratory           

        respiratory 

effort/rhythm                   Overall:                   no retractions       

                          2010                   None                

 

                    Full Exam - General                   Respiratory           

        respiratory 

effort/rhythm                   Overall:                   normal rate          

                          2010                   None                

 

                    Full Exam - General                   Cardiovascular        

           

auscultation of heart                   Overall:                   regular rate 

                          2010                   None                

 

                    Full Exam - General                   Cardiovascular        

           

auscultation of heart                   Overall:                   normal heart 

sounds                    2010                   None                

 

                    Full Exam - General                   Musculoskeletal       

            left 

lower extremity                   Overall:                   left ankle benign  

                          2010                   None                

 

                    Full Exam - General                   Musculoskeletal       

            left 

lower extremity                   Inspection - left foot:                   

swelling                   2010                   None                

 

                    Full Exam - General                   Constitutional        

            general 

appearance                   Overall:                   well nourished          

                          2010                   None                

 

                    Full Exam - General                   Constitutional        

            general 

appearance                   Overall:                   well developed          

                          2010                   None                

 

                    Full Exam - General                   Constitutional        

            general 

appearance                   Overall:                   in no acute distress    

                          2010                   None                

 

                    Full Exam - General                   Eyes                  

 conjunctiva/eyelids

                          Overall:                   conjunctiva clear          

        

                          2010                   None                

 

                    Full Exam - General                   Eyes                  

 conjunctiva/eyelids

                    Overall:                   cornea clear                   

2010                              None                

 

                    Full Exam - General                   Eyes                  

 conjunctiva/eyelids

                    Overall:                   eyelids normal                   

2010                              None                

 

                    Full Exam - General                   Eyes                  

 pupils and irises  

                          Overall:                   pupils equal, round, reacti

ve to 

light and accomodation                   2010                   None      

         

 

                    Full Exam - General                   Ears/Nose/Throat      

             

otoscopic exam                   Overall:                   external auditory 

canals clear                   2010                   None                

 

                    Full Exam - General                   Ears/Nose/Throat      

             

otoscopic exam                   Overall:                   tympanic membranes 

clear                     2010                   None                

 

                    Full Exam - General                   Ears/Nose/Throat      

             

internal nose                   Overall:                   bilateral nasal 

cavities clear                   2010                   None              

 

 

                    Full Exam - General                   Ears/Nose/Throat      

             oral 

cavity/pharynx/larynx                    Oral mucosa:                   a normal

exam                      2010                   None                

 

                    Full Exam - General                   Ears/Nose/Throat      

             oral 

cavity/pharynx/larynx                    Oropharynx:                   a normal 

exam                      2010                   None                

 

                    Full Exam - General                   Neck                  

 inspection of neck 

                    Overall:                   normal size                   

2010                              None                

 

                    Full Exam - General                   Neck                  

 inspection of neck 

                    Overall:                   no masses                   

2010                              None                

 

                    Full Exam - General                   Respiratory           

        auscultation

                          Overall:                   breath sounds clear bilater

ally    

                          2010                   None                

 

                    Full Exam - General                   Respiratory           

        auscultation

                    Diffuse:                   a normal exam                   

2010                              None                

 

                    Full Exam - General                   Respiratory           

        auscultation

                          Left upper lung field:                   a normal exam

        

                          2010                   None                

 

                    Full Exam - General                   Respiratory           

        auscultation

                          Left lower lung field:                   a normal exam

        

                          2010                   None                

 

                    Full Exam - General                   Respiratory           

        auscultation

                          Right upper lung field:                   a normal exa

m       

                          2010                   None                

 

                    Full Exam - General                   Respiratory           

        auscultation

                          Right middle lung field:                   a normal ex

am      

                          2010                   None                

 

                    Full Exam - General                   Respiratory           

        auscultation

                          Right lower lung field:                   a normal exa

m       

                          2010                   None                

 

                    Full Exam - General                   Cardiovascular        

           

auscultation of heart                   Overall:                   regular rate 

                          2010                   None                

 

                    Full Exam - General                   Cardiovascular        

           

auscultation of heart                   Overall:                   normal heart 

sounds                    2010                   None                

 

                    Full Exam - General                   Cardiovascular        

           

auscultation of heart                   Overall:                   no murmurs   

                          2010                   None                

 

                    Full Exam - General                   Cardiovascular        

           

auscultation of heart                   Rate:                     regular rate  

  

                          2010                   None                

 

                    Full Exam - General                   Cardiovascular        

           

auscultation of heart                   Rhythm:                   regular rhythm

                          2010                   None                

 

                    Full Exam - General                   Cardiovascular        

           

auscultation of heart                   S1:                       a normal exam 

    

                          2010                   None                

 

                    Full Exam - General                   Cardiovascular        

           

auscultation of heart                   S2:                       a normal exam 

    

                          2010                   None                

 

                    Full Exam - General                   Cardiovascular        

           

extremities                   Overall:                   no clubbing            

                          2010                   None                

 

                    Full Exam - General                   Cardiovascular        

           

extremities                   Overall:                   No edema               

                          2010                   None                

 

                    Full Exam - General                   Cardiovascular        

           

extremities                   Overall:                   No cyanosis            

                          2010                   None                

 

                    Full Exam - General                   Abdomen               

    abdominal exam  

                    Overall:                   no masses                   

2010                              None                

 

                    Full Exam - General                   Abdomen               

    abdominal exam  

                    Overall:                   no tenderness                   

2010                              None                

 

                    Full Exam - General                   Abdomen               

    abdominal exam  

                          Overall:                   normal bowel sounds        

          

                          2010                   None                

 

                    Full Exam - General                   Abdomen               

    abdominal exam  

                    Overall:                   soft                   2010

    

                                        None                

 

                    Full Exam - General                   Musculoskeletal       

            spine, 

ribs and pelvis                   Spine:                    a normal exam       

 

                          2010                   None                

 

                    Full Exam - General                   Musculoskeletal       

            gait and

station                   Gait:                   symmetric                   

2010                              None                

 

                    Full Exam - General                   Integument            

       inspection of

skin                      Overall:                   no rash, lesions           

   

                          2010                   None                

 

                    Full Exam - General                   Neurologic            

       deep tendon 

reflexes                   Overall:                   deep tendon reflexes 

intact                    2010                   None                

 

                    Full Exam - General                   Neurologic            

       mental status

                    Overall:                   alert                   

0 

                                        None                

 

                    Full Exam - General                   Neurologic            

       mental status

                    Overall:                   oriented                   

2010                              None                

 

                    Full Exam - General                   Neurologic            

       gait         

                          Overall:                   no ataxia, no unsteadiness 

                 

                          2010                   None                

 

                    Full Exam - General                   Neurologic            

       cranial 

nerves                    Overall:                   cranial nerves 1-12 intact 

 

                          2010                   None                

 

                    Full Exam - General                   Psychiatric           

        mood and 

affect                    Overall:                   normal mood and affect     

 

                          2010                   None                

 

                    Full Exam - General                   Integument            

       inspection of

skin                      Rash/Lesions:                   pustule               

   

                          2010                   None                

 

                    Full Exam - General                   Integument            

       inspection of

skin                      Rash/Lesions:                   blackheads            

   

                          2010                   None                

 

                    Full Exam - General                   Constitutional        

            general 

appearance                   Overall:                   well nourished          

                          2010                   None                

 

                    Full Exam - General                   Constitutional        

            general 

appearance                   Overall:                   well developed          

                          2010                   None                

 

                    Full Exam - General                   Constitutional        

            general 

appearance                   Overall:                   in no acute distress    

                          2010                   None                

 

                    Full Exam - General                   Integument            

       inspection of

skin                   Location:                   face                   

2010                              None                

 

                    Full Exam - General                   Integument            

       inspection of

skin                   Location:                   back                   

2010                              None                

 

                    Full Exam - General                   Integument            

       inspection of

skin                   Rash/Lesions:                   papule                   

2010                              None                



                                                                              



Procedures

                      



                    Procedure                   Codes                   Date    

            

 

                                              STREP A ASSAY W/OPTIC             

                        

CPT-4: 09806                            2011                



                                                                              



Vital Signs

                      



                          Date                      Vital                

 

                          2012                                       Blood

 Pressure 1: 110/62 Code: 

8480-6                                                         BMI: 18.0 Code: 

20154-3                                                         Heart Rate 1: 74

bpm                                                         Height: 5'4"        

                                                            Temperature: 36.7 (C

) / 98.1 (F)

                                                            Weight: 105 lbs     

    

                         

 

                          2012                                       Blood

 Pressure 1: 100/62 Code: 

8480-6                                                         BMI: 17.9 Code: 

88327-4                                                         Heart Rate 1: 88

bpm                                                         Height: 5'4"        

                                                            Temperature: 36.7 (C

) / 98.0 (F)

                                                            Weight: 104 lbs 6 oz

    

                             

 

                          2011                                       [place]

rature: 36.9 (C) / 98.4 

(F)                                                         Weight: 104 lbs     

                             

 

                          11/10/2011                                       Blood

 Pressure 1: 102/70 Code: 

8480-6                                                         BMI: 17.9 Code: 

12523-9                                                         Heart Rate 1: 88

bpm                                                         Height: 5'4"        

                                                            Temperature: 36.7 (C

) / 98.0 (F)

                                                            Weight: 104 lbs     

    

                         

 

                          2011                                       Blood

 Pressure 1: 112/66 Code: 

8480-6                                                         BMI: 17.7 Code: 

33864-5                                                         Heart Rate 1: 84

bpm                                                         Height: 5'4"        

                                                            Temperature: 36.8 (C

) / 98.3 (F)

                                                            Weight: 103 lbs     

    

                         

 

                          2011                                       Blood

 Pressure 1: 106/70 Code: 

8480-6                                                         Temperature: 36.6

(C) / 97.9 (F)                                                         Weight: 

104 lbs                                   

 

                          2010                                       Blood

 Pressure 1: 96/60 Code: 

8480-6                                                         Heart Rate 1: 84 

bpm                                                         Temperature: 36.8 

(C) / 98.2 (F)                                                         Weight: 

106 lbs                                   

 

                          2010                                       [place]

rature: 36.5 (C) / 97.7 

(F)                                                         Weight: 107 lbs     

                             

 

                          2010                                       Blood

 Pressure 1: 122/68 Code: 

8480-6                                                         BMI: 19.7 Code: 

86931-4                                                         Heart Rate 1: 88

bpm                                                         Height: 5'2"        

                                                            Temperature: 36.4 (C

) / 97.6 (F)

                                                            Weight: 106 lbs     

    

                         

 

                          2010                                       Blood

 Pressure 1: 108/70 Code: 

8480-6                                                         BMI: 20.0 Code: 

18104-6                                                         Heart Rate 1: 92

bpm                                                         Height: 5'1"        

                                                            Temperature: 36.6 (C

) / 97.8 (F)

                                                            Weight: 107 lbs     

    

                         



                                                                                
                                                                                
                



Functional Status

          No Functional Status data                                             
            



History of Present Illness

                      



                    Symptom Name                   Status                   Resu

lt                  

                          Effective Date                   Notes                

 

                    breast complaint                   Location                 

  in the left nipple

                          2012                   None                

 

                    breast complaint                   Location                 

  in the right 

nipple                    2012                   None                

 

                    breast complaint                   Quality                  

 constant           

                          2012                   None                

 

                    breast complaint                   Quality                  

 worsening          

                          2012                   None                

 

                    breast complaint                   Onset and Resolution     

              sudden

in onset                   2012                   None                

 

                    breast complaint                   Onset of Symptom         

          1 weeks 

ago                       2012                   None                

 

                    breast complaint                   Severity                 

  moderate          

                          2012                   None                

 

                    sore throat                   Location                   on 

both sides          

                          2012                   None                

 

                    sore throat                   Quality                   cons

tant                

                          2012                   None                

 

                    sore throat                   Quality                   achi

ng                  

                          2012                   None                

 

                    sore throat                   Quality                   scra

tchy                

                          2012                   None                

 

                    sore throat                   Onset and Resolution          

         sudden in 

onset                     2012                   None                

 

                    sore throat                   Onset of Symptom              

     3 days ago     

                          2012                   None                

 

                    sore throat                   Limitation on Activities      

             limits 

oral intake                   2012                   None                

 

                nausea                   Quality                   constant     

              

2012                              None                

 

                    nausea                   Onset and Resolution               

    sudden in onset 

                          2012                   None                

 

                    nausea                   Onset of Symptom                   

2 days ago          

                          2012                   None                

 

                    cough                   Location                   in the th

roat                

                          2012                   None                

 

                cough                   Quality                   constant      

             

2012                              None                

 

                    cough                   Quality                   interrupts

 sleep              

                          2012                   None                

 

                cough                   Quality                   productive    

               

2012                              None                

 

                    cough                   Onset and Resolution                

   sudden in onset  

                          2012                   None                

 

                    cough                   Onset of Symptom                   3

 days ago           

                          2012                   None                

 

                    cough                   Limitation on Activities            

       does not 

limit activities                   2012                   None            

   

 

                    nasal discharge                   Location                  

 in both  nares     

                          2012                   None                

 

                    nasal discharge                   Quality                   

constant            

                          2012                   None                

 

                    nasal discharge                   Quality                   

thin                

                          2012                   None                

 

                    nasal discharge                   Onset and Resolution      

             sudden 

in onset                   2012                   None                

 

                    nasal discharge                   Onset of Symptom          

         3 days ago 

                          2012                   None                

 

                    nasal discharge                   Severity                  

 moderate           

                          2012                   None                

 

                sore throat                   Quality                   acute   

                

2011                              None                

 

                    sore throat                   Location                   dif

fusely              

                          2011                   None                

 

                    cough                   Location                   in the th

roat                

                          11/10/2011                   None                

 

                cough                   Quality                   constant      

             

11/10/2011                              None                

 

                cough                   Quality                   productive    

               

11/10/2011                              None                

 

                cough                   Quality                   worsening     

              

11/10/2011                              None                

 

                    cough                   Onset and Resolution                

   sudden in onset  

                          11/10/2011                   None                

 

                    cough                   Onset of Symptom                   3

 days ago           

                          11/10/2011                   None                

 

                    cough                   Limitation on Activities            

       does not 

limit activities                   11/10/2011                   None            

   

 

                    cough                   Frequency of Episodes               

    increasing      

                          11/10/2011                   None                

 

                    nasal discharge                   Location                  

 in both  nares     

                          11/10/2011                   None                

 

                    nasal discharge                   Quality                   

worsening           

                          11/10/2011                   None                

 

                    nasal discharge                   Quality                   

constant            

                          11/10/2011                   None                

 

                    nasal discharge                   Quality                   

clear               

                          11/10/2011                   None                

 

                    nasal discharge                   Quality                   

thin                

                          11/10/2011                   None                

 

                    nasal discharge                   Onset and Resolution      

             sudden 

in onset                   11/10/2011                   None                

 

                    nasal discharge                   Onset of Symptom          

         3 days ago 

                          11/10/2011                   None                

 

                    nasal discharge                   Severity                  

 moderate           

                          11/10/2011                   None                

 

                    nasal discharge                   Frequency of Episodes     

              

increasing                   11/10/2011                   None                

 

                    abdominal pain                   Location                   

in the LUQ          

                          11/10/2011                   None                

 

                    abdominal pain                   Quality                   c

onstant             

                          11/10/2011                   None                

 

                    abdominal pain                   Quality                   c

ramping             

                          11/10/2011                   None                

 

                    abdominal pain                   Quality                   s

tabbing             

                          11/10/2011                   None                

 

                    abdominal pain                   Quality                   w

orsening            

                          11/10/2011                   None                

 

                    abdominal pain                   Onset and Resolution       

            sudden 

in onset                   11/10/2011                   None                

 

                    abdominal pain                   Onset of Symptom           

        3 days ago  

                          11/10/2011                   None                

 

                    abdominal pain                   Limitation on Activities   

                

moderately limits activities                   11/10/2011                   None

               

 

                diarrhea                   Quality                   loose      

             

11/10/2011                              None                

 

                diarrhea                   Quality                   watery     

              

11/10/2011                              None                

 

                    diarrhea                   Onset and Resolution             

      sudden in 

onset                     11/10/2011                   None                

 

                    diarrhea                   Onset of Symptom                 

  3 days ago        

                          11/10/2011                   None                

 

                    follow up                   Illness                   sympto

ms improved         

                          2011                   None                

 

                    abdominal pain                   Quality                   i

mproving            

                          2011                   None                

 

                    abdominal pain                   Location                   

in the LUQ          

                          2010                   past 2wks--burning sensat

ion               



 

                    nausea                   Quality                   intermitt

ent                 

                          2010                   worse after eats past 2wk

s                

 

                follow up                   Injury                   improved   

                

2010                              Fx pinkie toe on left foot last Friday d

oing 

handstand walks.                 

 

                    acne vulgaris                   Location                   o

n the face          

                          2010                   None                

 

                    acne vulgaris                   Location                   o

n the back          

                          2010                   None                

 

                    dermatitis - seborrheic                   Location          

         on the 

scalp                     2010                   None                

 

                    dermatitis - seborrheic                   Location          

         on both 

nasolabial folds                   2010                   None            

   

 

                    dermatitis - seborrheic                   Quality           

        constant    

                          2010                   None                

 

                    dermatitis - seborrheic                   Quality           

        dry         

                          2010                   have tried numerous OTC s

hampoos          

     



                                                                              



Advance Directives

          No Advance Directive data                                             
                      



Encounters

                      



                    Encounter                   Performer                   Loca

tion                

                          Codes                     Date                

 

                                                            OFFICE/OUTPATIENT VI

SIT EST

Diagnosis: Skin lesion[ICD9: 709.9]

Diagnosis: Acne[ICD9: 706.1]                                     [facility] [employer]                   CPT-4: 

39410                                   2012                

 

                                                            OFFICE/OUTPATIENT VI

SIT EST

Diagnosis: COUGH[ICD9: 786.2]

Diagnosis: SINUSITIS, ACUTE[ICD9: 461.9]

Diagnosis: PHARYNGITIS, ACUTE[ICD9: 462]                                     

[employer] [facility]            

   

                          CPT-4: 64403                   2012             

   

 

                                                            OFFICE/OUTPATIENT VI

SIT EST

Diagnosis: SINUSITIS, ACUTE[ICD9: 461.9]

Diagnosis: PHARYNGITIS, ACUTE[ICD9: 462]                                     

[employer] [facility]            

   

                          CPT-4: 89610                   2011             

   

 

                                                            OFFICE/OUTPATIENT VI

SIT EST

Diagnosis: ABDOMINAL PAIN[ICD9: 789.00]

Diagnosis: Breast lump[ICD9: 611.72]

Diagnosis: COUGH[ICD9: 786.2]

Diagnosis: PHARYNGITIS, ACUTE[ICD9: 462]

Diagnosis: DIARRHEA[ICD9: 787.91]                                     [facility]                   CPT-4: 

22690                                   11/10/2011                

 

                                                            (41512) PREV VISIT E

ST AGE 12-17                            

                          [facility]

[facility]       

                          CPT-4: 93594                   2011             

   

 

                                                            (88182) OFFICE/OUTPA

TIENT VISIT, EST                        

                          [facility]ete [facility]

[facility]   

                          CPT-4: 05032                   2011             

   

 

                                                            (69764) OFFICE/OUTPA

TIENT VISIT, EST                        

                          Pete REED

[facility]   

                          CPT-4: 02675                   2010             

   

 

                                                            (07542) OFFICE/OUTPA

TIENT VISIT, EST                        

                          Pete REED

[facility]   

                          CPT-4: 87388                   2010             

   

 

                                                            (67837) PREV VISIT, 

EST, AGE 12-17                          

                          Pete REED

[facility]     

                          CPT-4: 48922                   2010             

   

 

                                                            (41020) OFFICE/OUTPA

TIENT VISIT, EST                        

                          Pete REED

[facility]   

                          CPT-4: 72866                   2010             

   



                                                                                
                                                                                
                                                        



Plan of Care

                      



                    Planned Activity                   Notes                   C

odes                

                          Status                    Date                

 

                                        Appointment: Pete M[facility]

WPtel:[phone] [address] [facility]

US                                      mom called at 2:12 to cancel appt said w

ent out of town    

                                        WELL CHILD                   2012 

      

        

 

                          Visit Plan:                    states Minocin not help

ing acne any more. 

Discussed that nipple hair and circular bumps on areola are normal. Mupirocin to
one area that is slightly enlarged/irritated. Discussed that sleeping without 
bra on may decrease irritation.

                                                            2012          

  

   

 

                                        Appointment: Seda Loza

WPtel:[phone] [address] [facility]

                                                           ACUTE ILLNESS      

 

                                        2012                

 

                          Patient Education: Patient Medication Summary         

                          

                                        Completed                   2012  

              

 

                          Visit Plan:                    Refil on Metro cream fo

r skin acne. Azithromycin.

Discussed fluids and comfort care. Note for school. Parents want refil for "skin
medicines".

                                                            2012          

  

   

 

                                        Appointment: Seda Loza

WPtel:[phone]

                                        [address] [place], [place] [id number]

                                                           ACUTE ILLNESS      

 

                                        2012                

 

                          Patient Education: Patient Medication Summary         

                          

                                        Completed                   2012  

              

 

                          Visit Plan:                    New toothebrush in 5 da

ys

                                                            2011          

  

   

 

                                        Appointment: Pete Marmolejo

WPtel:[phone]

                                        [address] [place], [place] [id number]

                                                           THROAT SWAB        

 

                                        2011                

 

                          Patient Education: Patient Medication Summary         

                          

                                        Completed                   2011  

              

 

                          Visit Plan:                    order for lab draw: CBC

, CMP, HCG and sed rate. 

Will take imodium and notify if symptoms persist. Will consider ultrasound or 
mammo if breast issue continues. Antibiotic likely if diarrhea resolves but 
cough and other symptoms persist.

                                                            11/10/2011          

  

   

 

                                        Appointment: Seda Loza

WPtel:[phone]

                                        [address] [place], [place] [id number]                                                           ACUTE ILLNESS      

 

                                        11/10/2011                

 

                          Patient Education: Patient Medication Summary         

                          

                                        Completed                   11/10/2011  

              

 

                          Visit Plan:                    will schedule for ultra

sound of rt. breast. 

Mother and pt. will monitor for signs of infection and notify us if discharge, 
redness or warmth occur.

                                                            2011          

  

   

 

                                        Appointment: Seda Loza

WPtel:[phone]

                                        [address] [place], [place] [id number]

                                                           SPORTS PHYSICAL    

 

                                        2011                

 

                          Patient Education: Patient Medication Summary         

                          

                                        Completed                   2011  

              

 

                                        Appointment: Pete Marmolejo

WPtel:[phone]

                                        [address] [place], [place] [id number]

                                                           FOLLOW UP          

 

                                        2011                

 

                          Visit Plan:                    Continue Nexium for 2 m

ore weeks then DC--if 

symptoms return will notify us

                                                            2011          

  

   

 

                                        Appointment: Pete Marmolejo

WPtel:[phone]

                                        [address] [place] [address]                                                           Hospital Follow Up 

 

                                        2011                

 

                          Patient Education: Patient Medication Summary         

                          

                                        Completed                   2011  

              

 

                          Visit Plan:                    Trial of Nexium 40mg po

 daily

                                                            2010          

  

   

 

                                        Appointment: Pete Marmolejo

WPtel:[phone]

                                        [address] [place], [place] [address]                                                           ACUTE ILLNESS      

 

                                        2010                

 

                          Patient Education: Patient Medication Summary         

                          

                                        Completed                   2010  

              

 

                          Visit Plan:                    Ortho consult if pain w

orsens or does not 

improve. RX for aircast. Father has borrowed an aircast from a friend and the 
hard soled shoe that the pt. was given at the ER has caused discomfort on the t
op of the foot and the heel from friction. Pt will continue RICE and Ibuprofen 
regimen. Discussed avoidance of high impact activities.

                                                            2010          

  

   

 

                                        Appointment: Seda Loza

WPtel:[phone]

                                        [address] [place] [address]                                                           ER Follow UP       

 

                                        2010                

 

                          Patient Education: Patient Medication Summary         

                          

                                        Completed                   2010  

              

 

                          Visit Plan:                    [facility] form filled out

                                                            2010          

  

   

 

                                        Appointment: Pete Marmolejo

WPtel:[phone]

                                        [address] [place] [address]                                                           WELL CHILD         

 

                                        2010                

 

                          Patient Education: Patient Medication Summary         

                          

                                        Completed                   2010  

              

 

                          Visit Plan:                    Use Purpose Soap Trial 

of Selsun Blue alternating

w/ Ketoconazole

                                                            2010          

  

   

 

                                        Appointment: Pete Marmolejo

WPtel:[phone]

                                        [address] [place] [address]                                                           ACUTE ILLNESS      

 

                                        2010                

 

                          Patient Education: Patient Medication Summary         

                          

                                        Completed                   2010  

              



                                                                                
                                                                                
                                                                                
                                   



Instructions

                      



                                        Comment                

 

                                                            . Refil on Metro cre

am for skin acne.  Azithromycin.  

Discussed fluids and comfort care.  Note for school.  Parents want refil for 
"skin medicines".                                    

 

                                                            . Ortho consult if p

ain worsens or does not improve.  RX for

aircast.  Father has borrowed an aircast from a friend and the hard soled shoe 
that the pt. was given at the ER has caused discomfort on the top of the foot 
and the heel from friction.  

Pt will continue RICE and Ibuprofen regimen. Discussed avoidance of high impact 
activities.                                  

 

                                                            . Trial of Nexium 40

mg po daily

                                  

 

                                                            . Use Purpose Soap

Trial of Selsun Blue alternating w/ Ketoconazole                                
 

 

                                                            . states Minocin not

 helping acne any more.  Discussed that 

nipple hair and circular bumps on areola are normal. Mupirocin to one area that 
is slightly enlarged/irritated.  Discussed that sleeping without bra on may 
decrease irritation.                                  

 

                                                            . will schedule for 

ultrasound of rt. breast.  Mother and 

pt. will monitor for signs of infection and notify us if discharge, redness or 
warmth occur.                                    

 

                                                            . New toothebrush in

 5 days                                 



 

                                                            . Continue Nexium fo

r 2 more weeks then DC--if symptoms 

return will notify us                                  

 

                                                            . order for lab draw

: CBC, CMP, HCG and sed rate.  Will take

imodium and notify if symptoms persist. Will consider ultrasound or mammo if 
breast issue continues.  Antibiotic likely if diarrhea resolves  but cough and 
other symptoms persist.                                   

 

                                                            . [facility] form filled ou

t

## 2020-02-08 ENCOUNTER — HOSPITAL ENCOUNTER (EMERGENCY)
Dept: HOSPITAL 75 - ER | Age: 25
Discharge: HOME | End: 2020-02-08
Payer: MEDICAID

## 2020-02-08 VITALS — BODY MASS INDEX: 19.51 KG/M2 | HEIGHT: 61.81 IN | WEIGHT: 106.04 LBS

## 2020-02-08 VITALS — SYSTOLIC BLOOD PRESSURE: 116 MMHG | DIASTOLIC BLOOD PRESSURE: 80 MMHG

## 2020-02-08 DIAGNOSIS — O44.02: ICD-10-CM

## 2020-02-08 DIAGNOSIS — Z3A.14: ICD-10-CM

## 2020-02-08 DIAGNOSIS — O23.42: ICD-10-CM

## 2020-02-08 DIAGNOSIS — O21.0: Primary | ICD-10-CM

## 2020-02-08 LAB
ALBUMIN SERPL-MCNC: 4.3 GM/DL (ref 3.2–4.5)
ALP SERPL-CCNC: 60 U/L (ref 40–136)
ALT SERPL-CCNC: 12 U/L (ref 0–55)
APTT PPP: (no result) S
BACTERIA #/AREA URNS HPF: (no result) /HPF
BASOPHILS # BLD AUTO: 0 10^3/UL (ref 0–0.1)
BASOPHILS NFR BLD AUTO: 0 % (ref 0–10)
BILIRUB SERPL-MCNC: 0.5 MG/DL (ref 0.1–1)
BILIRUB UR QL STRIP: NEGATIVE
BUN/CREAT SERPL: 12
CALCIUM SERPL-MCNC: 9.6 MG/DL (ref 8.5–10.1)
CHLORIDE SERPL-SCNC: 103 MMOL/L (ref 98–107)
CO2 SERPL-SCNC: 25 MMOL/L (ref 21–32)
CREAT SERPL-MCNC: 0.58 MG/DL (ref 0.6–1.3)
DOHLE BOD BLD QL SMEAR: SLIGHT
EOSINOPHIL # BLD AUTO: 0.2 10^3/UL (ref 0–0.3)
EOSINOPHIL NFR BLD AUTO: 2 % (ref 0–10)
EOSINOPHIL NFR BLD MANUAL: 3 %
ERYTHROCYTE [DISTWIDTH] IN BLOOD BY AUTOMATED COUNT: 12.5 % (ref 10–14.5)
FIBRINOGEN PPP-MCNC: (no result) MG/DL
GFR SERPLBLD BASED ON 1.73 SQ M-ARVRAT: > 60 ML/MIN
GLUCOSE SERPL-MCNC: 88 MG/DL (ref 70–105)
GLUCOSE UR STRIP-MCNC: NEGATIVE MG/DL
HCT VFR BLD CALC: 40 % (ref 35–52)
HGB BLD-MCNC: 14.1 G/DL (ref 11.5–16)
KETONES UR QL STRIP: (no result)
LEUKOCYTE ESTERASE UR QL STRIP: (no result)
LYMPHOCYTES # BLD AUTO: 0.5 X 10^3 (ref 1–4)
LYMPHOCYTES NFR BLD AUTO: 6 % (ref 12–44)
MANUAL DIFFERENTIAL PERFORMED BLD QL: YES
MCH RBC QN AUTO: 30 PG (ref 25–34)
MCHC RBC AUTO-ENTMCNC: 35 G/DL (ref 32–36)
MCV RBC AUTO: 87 FL (ref 80–99)
MONOCYTES # BLD AUTO: 0.4 X 10^3 (ref 0–1)
MONOCYTES NFR BLD AUTO: 4 % (ref 0–12)
MONOCYTES NFR BLD: 3 %
NEUTROPHILS # BLD AUTO: 8.3 X 10^3 (ref 1.8–7.8)
NEUTROPHILS NFR BLD AUTO: 89 % (ref 42–75)
NEUTS BAND NFR BLD MANUAL: 82 %
NEUTS BAND NFR BLD: 3 %
NITRITE UR QL STRIP: NEGATIVE
PH UR STRIP: 6.5 [PH] (ref 5–9)
PLATELET # BLD: 221 10^3/UL (ref 130–400)
PMV BLD AUTO: 10.9 FL (ref 7.4–10.4)
POTASSIUM SERPL-SCNC: 3.9 MMOL/L (ref 3.6–5)
PROT SERPL-MCNC: 7.2 GM/DL (ref 6.4–8.2)
PROT UR QL STRIP: (no result)
RBC #/AREA URNS HPF: (no result) /HPF
RBC MORPH BLD: NORMAL
SODIUM SERPL-SCNC: 138 MMOL/L (ref 135–145)
SP GR UR STRIP: 1.02 (ref 1.02–1.02)
SQUAMOUS #/AREA URNS HPF: (no result) /HPF
VARIANT LYMPHS NFR BLD MANUAL: 9 %
WBC # BLD AUTO: 9.4 10^3/UL (ref 4.3–11)
WBC #/AREA URNS HPF: (no result) /HPF

## 2020-02-08 PROCEDURE — 36415 COLL VENOUS BLD VENIPUNCTURE: CPT

## 2020-02-08 PROCEDURE — 85027 COMPLETE CBC AUTOMATED: CPT

## 2020-02-08 PROCEDURE — 85007 BL SMEAR W/DIFF WBC COUNT: CPT

## 2020-02-08 PROCEDURE — 80053 COMPREHEN METABOLIC PANEL: CPT

## 2020-02-08 PROCEDURE — 96374 THER/PROPH/DIAG INJ IV PUSH: CPT

## 2020-02-08 PROCEDURE — 84703 CHORIONIC GONADOTROPIN ASSAY: CPT

## 2020-02-08 PROCEDURE — 87088 URINE BACTERIA CULTURE: CPT

## 2020-02-08 PROCEDURE — 81000 URINALYSIS NONAUTO W/SCOPE: CPT

## 2020-02-08 PROCEDURE — 84702 CHORIONIC GONADOTROPIN TEST: CPT

## 2020-02-08 NOTE — NUR
PT REPORTS SHE FEELS LIKE SHE IS GOING TO GET SICK.  PAN TAKEN TO PT.  
EXPLAINED THE TRIAGE PROCESS AND TO LET HER KNOW SHE NEEDS TO BE IN THE ER AND 
AS SOON AS A NURSE COULD BRING HER BACK THEY WOULD.

## 2020-02-08 NOTE — ED GENERAL
General


Chief Complaint:  OB/GYN


Stated Complaint:  VOMITING / CRAMPING 15WKS PREG


Nursing Triage Note:  


PT STATES SHE IS 14 WEEKS GESTATION AND HAS A HX OF PLACENTA PREVIA.  STATES SHE




HAS BEEN VOMITING X2 DAYS AND IS HAVING ABD CRAMPING AND SPOTTING STARTING 


YESTERDAY.


Nursing Sepsis Screen:  No Definite Risk





History of Present Illness


Date Seen by Provider:  2020


Time Seen by Provider:  12:30


Initial Comments


24-year-old  female is approximately 14 weeks gestation and reports on 

her 12th week ultrasound that placenta previa was noted (G3, P1, A1). She's been

vomiting for approximately 48 hours. She last took Zofran over 8 hours ago. She 

denies any vomiting over the last 4 hours, but continued nausea. She also has 

trace vaginal spotting, mainly when she urinates, no cramping at this time. 

Explained to patient that she do not have US available on the weekend. She 

reports Dr. Gutierrez told her to come to ED for any concerns.





Allergies and Home Medications


Allergies


Coded Allergies:  


     kiwi (Unverified  Allergy, Unknown, 14)





Home Medications


Nitrofurantoin Monohyd/M-Cryst 100 Mg Capsule, 1 TAB PO BID


   Prescribed by: ANGELA SHEARER on 20 1616





Patient Home Medication List


Home Medication List Reviewed:  Yes





Review of Systems


Review of Systems


Constitutional:  no symptoms reported, see HPI; No fever


Gastrointestinal:  see HPI; No abdominal pain, No diarrhea; nausea; No vomiting


Pregnant:  Yes


Expected Date of Delivery:  Aug 5, 2020





All Other Systems Reviewed


Negative Unless Noted:  Yes





Past Medical-Social-Family Hx


Patient Social History


Alcohol Use:  Denies Use


Recreational Drug Use:  No


Smoking Status:  Never a Smoker


2nd Hand Smoke Exposure:  No


Recent Foreign Travel:  No


Contact w/Someone Who Travel:  No


Recent Infectious Disease Expo:  No


Recent Hopitalizations:  No





Immunizations Up To Date


PED Vaccines UTD:  No





Seasonal Allergies


Seasonal Allergies:  Yes





Past Medical History


Surgeries:  Yes (wisdom teeth)


Adenoidectomy,  Section, Tonsillectomy


Respiratory:  No


Cardiac:  No


Neurological:  No


Pregnant:  Yes


Expected Date of Delivery:  Aug 5, 2020


Hx :  3


Hx Para:  1


Hx Total # of Abortions (Sp):  1


Reproductive Disorders:  No


Female Reproductive Disorders:  Ovarian Cyst


Sexually Transmitted Disease:  No


HIV/AIDS:  No


Genitourinary:  No


Gastrointestinal:  No


Musculoskeletal:  No


Endocrine:  No


HEENT:  No


Cancer:  No


Psychosocial:  No


Integumentary:  No


Blood Disorders:  No


Adverse Reaction/Blood Tranf:  No





Family Medical History





Not obtainable due to adoption





Physical Exam


Vital Signs





Vital Signs - First Documented








 20





 11:45


 


Temp 37.1


 


Pulse 107


 


Resp 16


 


B/P (MAP) 116/80 (92)


 


Pulse Ox 99


 


O2 Delivery Room Air





Capillary Refill : Less Than 3 Seconds


Height, Weight, BMI


Height: 5'2.00"


Weight: 95lbs. 0.0oz. 43.365030zh; 19.00 BMI


Method:Stated


General Appearance:  No Apparent Distress


Eyes:  Bilateral Eye Normal Inspection, Bilateral Eye PERRL, Bilateral Eye EOMI


HEENT:  PERRL/EOMI, TMs Normal, Normal ENT Inspection, Pharynx Normal, Other 

(oromucosa pink and moist)


Neck:  Full Range of Motion, Normal Inspection, Supple


Respiratory:  Chest Non Tender, Lungs Clear, Normal Breath Sounds


Cardiovascular:  Regular Rate, Rhythm, No Murmur, Normal Peripheral Pulses


Gastrointestinal:  Normal Bowel Sounds, Non Tender, Soft


Back:  Normal Inspection, No CVA Tenderness


Extremity:  Normal Capillary Refill, Normal Inspection, Normal Range of Motion, 

No Calf Tenderness, No Pedal Edema


Neurologic/Psychiatric:  Alert, Oriented x3, No Motor/Sensory Deficits, Normal 

Mood/Affect





Progress/Results/Core Measures


Suspected Sepsis


Recent Fever Within 48 Hours:  No


Infection Criteria Present:  None


New/Unexplained  Altered Menta:  No


Sepsis Screen:  No Definite Risk


SIRS


Temperature: 


Pulse: 107 


Respiratory Rate: 16


 


Laboratory Tests


20 13:31: White Blood Count 9.4


Blood Pressure 116 /80 


Mean: 92


 


Laboratory Tests


20 13:31: 


Creatinine 0.58L, Platelet Count 221, Total Bilirubin 0.5








Results/Orders


Lab Results





Laboratory Tests








Test


 20


13:31 20


15:41 Range/Units


 


 


White Blood Count


 9.4 


 


 4.3-11.0


10^3/uL


 


Red Blood Count


 4.65 


 


 4.35-5.85


10^6/uL


 


Hemoglobin 14.1   11.5-16.0  G/DL


 


Hematocrit 40   35-52  %


 


Mean Corpuscular Volume 87   80-99  FL


 


Mean Corpuscular Hemoglobin 30   25-34  PG


 


Mean Corpuscular Hemoglobin


Concent 35 


 


 32-36  G/DL





 


Red Cell Distribution Width 12.5   10.0-14.5  %


 


Platelet Count


 221 


 


 130-400


10^3/uL


 


Mean Platelet Volume 10.9 H  7.4-10.4  FL


 


Neutrophils (%) (Auto) 89 H  42-75  %


 


Lymphocytes (%) (Auto) 6 L  12-44  %


 


Monocytes (%) (Auto) 4   0-12  %


 


Eosinophils (%) (Auto) 2   0-10  %


 


Basophils (%) (Auto) 0   0-10  %


 


Neutrophils # (Auto) 8.3 H  1.8-7.8  X 10^3


 


Lymphocytes # (Auto) 0.5 L  1.0-4.0  X 10^3


 


Monocytes # (Auto) 0.4   0.0-1.0  X 10^3


 


Eosinophils # (Auto)


 0.2 


 


 0.0-0.3


10^3/uL


 


Basophils # (Auto)


 0.0 


 


 0.0-0.1


10^3/uL


 


Neutrophils % (Manual) 82    %


 


Lymphocytes % (Manual) 9    %


 


Monocytes % (Manual) 3    %


 


Eosinophils % (Manual) 3    %


 


Band Neutrophils 3    %


 


Dohle Bodies SLIGHT    


 


Blood Morphology Comment NORMAL    


 


Sodium Level 138   135-145  MMOL/L


 


Potassium Level 3.9   3.6-5.0  MMOL/L


 


Chloride Level 103     MMOL/L


 


Carbon Dioxide Level 25   21-32  MMOL/L


 


Anion Gap 10   5-14  MMOL/L


 


Blood Urea Nitrogen 7   7-18  MG/DL


 


Creatinine


 0.58 L


 


 0.60-1.30


MG/DL


 


Estimat Glomerular Filtration


Rate > 60 


 


  





 


BUN/Creatinine Ratio 12    


 


Glucose Level 88     MG/DL


 


Calcium Level 9.6   8.5-10.1  MG/DL


 


Corrected Calcium 9.4   8.5-10.1  MG/DL


 


Total Bilirubin 0.5   0.1-1.0  MG/DL


 


Aspartate Amino Transf


(AST/SGOT) 17 


 


 5-34  U/L





 


Alanine Aminotransferase


(ALT/SGPT) 12 


 


 0-55  U/L





 


Alkaline Phosphatase 60     U/L


 


Total Protein 7.2   6.4-8.2  GM/DL


 


Albumin 4.3   3.2-4.5  GM/DL


 


Human Chorionic Gonadotropin,


Quant 03496 H


 


 <5  MIU/ML





 


Urine Color  DARK YELLOW   


 


Urine Clarity  SL CLOUDY   


 


Urine pH  6.5  5-9  


 


Urine Specific Gravity  1.025 H 1.016-1.022  


 


Urine Protein  TRACE H NEGATIVE  


 


Urine Glucose (UA)  NEGATIVE  NEGATIVE  


 


Urine Ketones  3+ H NEGATIVE  


 


Urine Nitrite  NEGATIVE  NEGATIVE  


 


Urine Bilirubin  NEGATIVE  NEGATIVE  


 


Urine Urobilinogen  1.0  < = 1.0  MG/DL


 


Urine Leukocyte Esterase  1+ H NEGATIVE  


 


Urine RBC (Auto)  NEGATIVE  NEGATIVE  


 


Urine RBC  NONE   /HPF


 


Urine WBC  5-10 H  /HPF


 


Urine Squamous Epithelial


Cells 


 2-5 


  /HPF





 


Urine Crystals  NONE   /LPF


 


Urine Bacteria  MODERATE H  /HPF


 


Urine Casts  NONE   /LPF


 


Urine Mucus  SMALL H  /LPF


 


Urine Culture Indicated  YES   








My Orders





Orders - ANGELA SHEARER QUIANA


Cbc With Automated Diff (20 12:25)


Comprehensive Metabolic Panel (20 12:25)


Hcg,Quantitative (20 12:25)


Ua Culture If Indicated (20 12:25)


Urine Pregnancy Bedside (20 12:25)


Manual Differential (20 13:31)


Ed Iv/Invasive Line Start (20 14:45)


Lactated Ringers (Lr 1000 Ml Iv Solution (20 14:45)


Ondansetron Injection (Zofran Injectio (20 14:45)


Urine Culture (20 15:41)





Medications Given in ED





Current Medications








 Medications  Dose


 Ordered  Sig/Arturo


 Route  Start Time


 Stop Time Status Last Admin


Dose Admin


 


 Lactated Ringer's  1,000 ml @ 


 0 mls/hr  Q0M ONCE


 IV  20 14:45


 20 14:47 DC 20 15:11


1,000 MLS/HR


 


 Ondansetron HCl  4 mg  ONCE  ONCE


 IVP  20 14:45


 20 14:47 DC 20 15:11


4 MG








Vital Signs/I&O











 20





 11:45 16:20


 


Temp 37.1 37.1


 


Pulse 107 107


 


Resp 16 16


 


B/P (MAP) 116/80 (92) 116/80 (92)


 


Pulse Ox 99 99


 


O2 Delivery Room Air 





Capillary Refill : Less Than 3 Seconds








Blood Pressure Mean:                    92








Progress Note :  


   Time:  12:30


Progress Note


Patient seen and evaluated, will obtain labs and reevaluate. Patient verbalizes 

understanding that and is not available here.


1300 all are 1 L per IV, and Zofran 4 mg IV for nausea.


1345 UA obtained.


1430 patient reports no further vaginal bleeding.


1515 UA shows UTI. Results discussed with the patient.


1555 Patient reports improvement in her symptoms. She is not vomited since 

admission here. Discharge instructions and return precautions reviewed with her 

in detail





Departure


Impression





   Primary Impression:  


   Nausea and vomiting in pregnancy prior to 22 weeks gestation


   Additional Impressions:  


   Placenta previa


   Qualified Codes:  O44.02 - Complete placenta previa nos or without 

   hemorrhage, second trimester


   UTI (urinary tract infection)


   Qualified Codes:  N30.01 - Acute cystitis with hematuria


Disposition:   HOME, SELF-CARE


Condition:  Improved





Departure-Patient Inst.


Decision time for Depature:  15:55


Referrals:  


NO,LOCAL PHYSICIAN (PCP)


Primary Care Physician








NORMAN CAPELLAN MD (Family)


Primary Care Physician


Patient Instructions:  Urinary Tract Infection, Adult (DC)





Add. Discharge Instructions:  


Continued bed rest and other precautions per Dr. Capellan.


Increase water intake, 16 ounces every 2 hours while awake.


Continue to take the Zofran 1 tablet every 6-8 hours as needed for nausea and 

vomiting.


Adhered to a clear liquid or bland diet when nauseous.


Take antibiotic as prescribed.


Call Dr. Capellan's office on Monday for follow-up appointment.


Return to the emergency department for increased vaginal bleeding, persistent 

nausea and vomiting, fever greater than 101, or other urgent health care needs.





All discharge instructions reviewed with patient and/or family. Voiced unders

tanding.


Scripts


Nitrofurantoin Monohyd/M-Cryst (Macrobid 100 mg Capsule) 100 Mg Capsule


1 TAB PO BID, #10 CAP 0 Refills


   Prov: ANGELA SHEARER         20





Copy


Copies To 1:   NORMAN CAPELLAN MD, AMY ARNP                   2020 14:56

## 2020-05-11 ENCOUNTER — HOSPITAL ENCOUNTER (OUTPATIENT)
Dept: HOSPITAL 75 - WSO | Age: 25
Discharge: HOME | End: 2020-05-11
Attending: OBSTETRICS & GYNECOLOGY
Payer: MEDICAID

## 2020-05-11 VITALS — SYSTOLIC BLOOD PRESSURE: 109 MMHG | DIASTOLIC BLOOD PRESSURE: 68 MMHG

## 2020-05-11 VITALS — DIASTOLIC BLOOD PRESSURE: 68 MMHG | SYSTOLIC BLOOD PRESSURE: 109 MMHG

## 2020-05-11 VITALS — WEIGHT: 134.26 LBS | HEIGHT: 61.81 IN | BODY MASS INDEX: 24.71 KG/M2

## 2020-05-11 DIAGNOSIS — Z3A.27: ICD-10-CM

## 2020-05-11 DIAGNOSIS — N89.8: ICD-10-CM

## 2020-05-11 DIAGNOSIS — O26.852: Primary | ICD-10-CM

## 2020-05-11 LAB
APTT PPP: YELLOW S
BACTERIA #/AREA URNS HPF: (no result) /HPF
BILIRUB UR QL STRIP: NEGATIVE
FIBRINOGEN PPP-MCNC: CLEAR MG/DL
GLUCOSE UR STRIP-MCNC: NEGATIVE MG/DL
KETONES UR QL STRIP: NEGATIVE
LEUKOCYTE ESTERASE UR QL STRIP: NEGATIVE
NITRITE UR QL STRIP: NEGATIVE
PH UR STRIP: 7 [PH] (ref 5–9)
PROT UR QL STRIP: NEGATIVE
RBC #/AREA URNS HPF: (no result) /HPF
SP GR UR STRIP: 1.01 (ref 1.02–1.02)
SQUAMOUS #/AREA URNS HPF: (no result) /HPF
WBC #/AREA URNS HPF: (no result) /HPF

## 2020-05-11 PROCEDURE — 87210 SMEAR WET MOUNT SALINE/INK: CPT

## 2020-05-11 PROCEDURE — 81000 URINALYSIS NONAUTO W/SCOPE: CPT

## 2020-05-11 PROCEDURE — 99213 OFFICE O/P EST LOW 20 MIN: CPT

## 2020-05-11 PROCEDURE — 87088 URINE BACTERIA CULTURE: CPT

## 2020-05-11 NOTE — NUR
ALEJANDRINA SCHAEFFER presented to unit via  from ED, accompanied by SO, with c/o CONTRACTIONS & 
LEAKING FLUID. ALEJANDRINA SCHAEFFER weighed, gowned, voided, and to bed.  EFHM and TOCO applied, VS 
taken.  ALEJANDRINA SCHAEFFER oriented to bed controls, call light, TV, heat, and A/C controls.

## 2020-05-11 NOTE — NUR
dr lazcano notified of pt lab results, co, s/s. FHT strip reactive with no UC to speak 
of, uterine irritability present. pt currently being treated for UTI wiht macrobid. pt wants 
to change pt from macrobid to keflex 500 TID for 1 week. Reassure pt and DC to home.

## 2020-05-12 NOTE — PHYSICIAN QUERY-FINAL DX
GIAN CURRIE 5/12/20 1051:


Clinic Account Progress/Dx


Physician Query:


Please give diagnosis





Please include # weeks gestation


Date of Service





May 11, 2020 at 14:29





NORMAN NEAL MD 5/13/20 0747:


Clinic Account Progress/Dx


DIAGNOSIS:


Diagnosis


. FALSE Labor at 27 weeks gestation











GIAN CURRIE                    May 12, 2020 10:51


NORMAN NEAL MD       May 13, 2020 07:47

## 2020-06-30 ENCOUNTER — HOSPITAL ENCOUNTER (OUTPATIENT)
Dept: HOSPITAL 75 - RAD | Age: 25
End: 2020-06-30
Attending: OBSTETRICS & GYNECOLOGY
Payer: MEDICAID

## 2020-06-30 DIAGNOSIS — M79.661: ICD-10-CM

## 2020-06-30 DIAGNOSIS — M79.662: ICD-10-CM

## 2020-06-30 DIAGNOSIS — M79.89: Primary | ICD-10-CM

## 2020-06-30 PROCEDURE — 93970 EXTREMITY STUDY: CPT

## 2020-06-30 NOTE — DIAGNOSTIC IMAGING REPORT
PROCEDURE: 

US Venous Lower Ext Robert.



TECHNIQUE: 

Multiple Real-time grayscale images were obtained over the lower

extremities in various projections, bilaterally. Additional

duplex Doppler and color Doppler images were also obtained.



INDICATION:  

Pain and swelling.



FINDINGS:

The bilateral femoropopliteal deep venous system is patent. No

deep or superficial thrombus. No mass or fluid collection. 



IMPRESSION: 

Normal negative bilateral lower extremity venous Doppler and

ultrasound.



Dictated by: 



  Dictated on workstation # [id number]

## 2020-07-16 ENCOUNTER — HOSPITAL ENCOUNTER (INPATIENT)
Dept: HOSPITAL 75 - LDRP | Age: 25
LOS: 2 days | Discharge: HOME | End: 2020-07-18
Attending: OBSTETRICS & GYNECOLOGY | Admitting: OBSTETRICS & GYNECOLOGY
Payer: MEDICAID

## 2020-07-16 VITALS — SYSTOLIC BLOOD PRESSURE: 105 MMHG | DIASTOLIC BLOOD PRESSURE: 70 MMHG

## 2020-07-16 VITALS — WEIGHT: 151.46 LBS | BODY MASS INDEX: 27.52 KG/M2 | HEIGHT: 62.01 IN

## 2020-07-16 VITALS — DIASTOLIC BLOOD PRESSURE: 64 MMHG | SYSTOLIC BLOOD PRESSURE: 117 MMHG

## 2020-07-16 VITALS — DIASTOLIC BLOOD PRESSURE: 64 MMHG | SYSTOLIC BLOOD PRESSURE: 110 MMHG

## 2020-07-16 VITALS — DIASTOLIC BLOOD PRESSURE: 60 MMHG | SYSTOLIC BLOOD PRESSURE: 96 MMHG

## 2020-07-16 VITALS — SYSTOLIC BLOOD PRESSURE: 105 MMHG | DIASTOLIC BLOOD PRESSURE: 71 MMHG

## 2020-07-16 VITALS — DIASTOLIC BLOOD PRESSURE: 71 MMHG | SYSTOLIC BLOOD PRESSURE: 102 MMHG

## 2020-07-16 VITALS — DIASTOLIC BLOOD PRESSURE: 77 MMHG | SYSTOLIC BLOOD PRESSURE: 120 MMHG

## 2020-07-16 VITALS — SYSTOLIC BLOOD PRESSURE: 129 MMHG | DIASTOLIC BLOOD PRESSURE: 76 MMHG

## 2020-07-16 VITALS — DIASTOLIC BLOOD PRESSURE: 71 MMHG | SYSTOLIC BLOOD PRESSURE: 115 MMHG

## 2020-07-16 VITALS — DIASTOLIC BLOOD PRESSURE: 66 MMHG | SYSTOLIC BLOOD PRESSURE: 105 MMHG

## 2020-07-16 DIAGNOSIS — Z3A.39: ICD-10-CM

## 2020-07-16 DIAGNOSIS — N83.8: ICD-10-CM

## 2020-07-16 DIAGNOSIS — O34.211: Primary | ICD-10-CM

## 2020-07-16 LAB
BASOPHILS # BLD AUTO: 0 10^3/UL (ref 0–0.1)
BASOPHILS NFR BLD AUTO: 0 % (ref 0–10)
EOSINOPHIL # BLD AUTO: 0.2 10^3/UL (ref 0–0.3)
EOSINOPHIL NFR BLD AUTO: 2 % (ref 0–10)
ERYTHROCYTE [DISTWIDTH] IN BLOOD BY AUTOMATED COUNT: 13.4 % (ref 10–14.5)
HCT VFR BLD CALC: 38 % (ref 35–52)
HGB BLD-MCNC: 13 G/DL (ref 11.5–16)
LYMPHOCYTES # BLD AUTO: 1.3 X 10^3 (ref 1–4)
LYMPHOCYTES NFR BLD AUTO: 15 % (ref 12–44)
MANUAL DIFFERENTIAL PERFORMED BLD QL: NO
MCH RBC QN AUTO: 30 PG (ref 25–34)
MCHC RBC AUTO-ENTMCNC: 35 G/DL (ref 32–36)
MCV RBC AUTO: 88 FL (ref 80–99)
MONOCYTES # BLD AUTO: 0.7 X 10^3 (ref 0–1)
MONOCYTES NFR BLD AUTO: 8 % (ref 0–12)
NEUTROPHILS # BLD AUTO: 6.9 X 10^3 (ref 1.8–7.8)
NEUTROPHILS NFR BLD AUTO: 75 % (ref 42–75)
PLATELET # BLD: 187 10^3/UL (ref 130–400)
PMV BLD AUTO: 10.9 FL (ref 7.4–10.4)
WBC # BLD AUTO: 9.2 10^3/UL (ref 4.3–11)

## 2020-07-16 PROCEDURE — 86850 RBC ANTIBODY SCREEN: CPT

## 2020-07-16 PROCEDURE — 87635 SARS-COV-2 COVID-19 AMP PRB: CPT

## 2020-07-16 PROCEDURE — 86900 BLOOD TYPING SEROLOGIC ABO: CPT

## 2020-07-16 PROCEDURE — 94664 DEMO&/EVAL PT USE INHALER: CPT

## 2020-07-16 PROCEDURE — 86901 BLOOD TYPING SEROLOGIC RH(D): CPT

## 2020-07-16 PROCEDURE — 36415 COLL VENOUS BLD VENIPUNCTURE: CPT

## 2020-07-16 PROCEDURE — 90715 TDAP VACCINE 7 YRS/> IM: CPT

## 2020-07-16 PROCEDURE — 85025 COMPLETE CBC W/AUTO DIFF WBC: CPT

## 2020-07-16 RX ADMIN — SODIUM CHLORIDE, SODIUM LACTATE, POTASSIUM CHLORIDE, AND CALCIUM CHLORIDE SCH MLS/HR: 600; 310; 30; 20 INJECTION, SOLUTION INTRAVENOUS at 11:45

## 2020-07-16 RX ADMIN — SODIUM CHLORIDE, SODIUM LACTATE, POTASSIUM CHLORIDE, AND CALCIUM CHLORIDE SCH MLS/HR: 600; 310; 30; 20 INJECTION, SOLUTION INTRAVENOUS at 11:02

## 2020-07-16 RX ADMIN — KETOROLAC TROMETHAMINE SCH MG: 30 INJECTION, SOLUTION INTRAMUSCULAR; INTRAVENOUS at 14:11

## 2020-07-16 RX ADMIN — DOCUSATE SODIUM SCH MG: 100 CAPSULE ORAL at 20:35

## 2020-07-16 RX ADMIN — OXYCODONE HYDROCHLORIDE AND ACETAMINOPHEN PRN TAB: 10; 325 TABLET ORAL at 15:05

## 2020-07-16 RX ADMIN — OXYCODONE HYDROCHLORIDE AND ACETAMINOPHEN PRN TAB: 10; 325 TABLET ORAL at 20:35

## 2020-07-16 RX ADMIN — KETOROLAC TROMETHAMINE SCH MG: 30 INJECTION, SOLUTION INTRAMUSCULAR; INTRAVENOUS at 20:35

## 2020-07-16 NOTE — DISCHARGE INST-SURGICAL
Discharge Inst-Surgical


Depart Medication/Instructions


New, Converted or Re-Newed RX:  RX on Chart





Consults/Follow Up


Patient Instructions:  


As directed


Orders & Referrals





Follow Up Appt:


RTC 1 week for incision check.


Call to make follow up appt. for patient in 4 weeks.





Wound Care:


Remove staples, apply benzoin and steri strips.





Activity 


Per routine post  instructions.





Please call in RX to patient pharmacy.





Diet as tolerated





Patient may shower or tub bathe as desired.





Continue home meds





Activity


Activity as Tolerated:  No





Diet


Discharge Diet:  No Restrictions











NORMAN NEAL MD       2020 12:10

## 2020-07-16 NOTE — XMS REPORT
Continuity of Care Document

                             Created on: 2020



Jeannie Skelton

External Reference #: [id number]

: 1995

Sex: Female



Demographics





                          Address                   [address] [place] [address]  [address]

 

                          Home Phone                [phone] x

 

                          Preferred Language        Unknown

 

                          Marital Status            Unknown

 

                          Christianity Affiliation     Unknown

 

                          Race                      Unknown

 

                          Ethnic Group              Unknown





Author





                          Organization              Unknown

 

                          Address                   Unknown

 

                          Phone                     Unavailable



              



Allergies

      



             Active           Description           Code           Type         

  Severity   

                Reaction           Onset           Reported/Identified          

 

Relationship to Patient                 Clinical Status        

 

             Yes           kiwi           [id number]           Drug Allergy     

      Unknown

             N/A                        2014                              

   



                  



Medications

      



There is no data.                  



Problems

      



             Date Dx Coded           Attending           Type           Code    

       

Diagnosis                               Diagnosed By        

 

           2010                       Ot           826.0                  

           

  

 

           2010                       Ot           959.7                  

           

  

 

           2010                       Ot           E000.8                 

           

   

 

           2010                       Ot           E849.0                 

           

   

 

           2010                       Ot           E917.9                 

           

   

 

           2011                       Ot           276.51                 

           

   

 

           2011                       Ot           493.00                 

           

   

 

           2011                       Ot           536.2                  

           

  

 

             2011                        Ot           564.1           IRRI

TABLE BOWEL 

SYNDROME                                         

 

             2011                        Ot           789.01           ABD

OMINAL PAIN, 

RIGHT UPPER QUADRANT                             

 

                2014           LALA MCCOY           Ot         

     599.0         

                          URIN TRACT INFECTION NOS                    

 

                2014           LALA MCCOY           Ot         

     620.2         

                          OVARIAN CYST NEC/NOS                    

 

                2014           LALA MCCOY           Ot         

     623.8         

                          NONINFLAM DIS VAGINA NEC                    

 

                2014           LALA MCCOY           Ot         

     789.09        

                          ABDOMINAL PAIN, OTHER SPECIFIED SITE                  

  

 

                2014           LALA MCCOY           Ot         

     V69.2         

                          HIGH-RISK SEXUAL BEHAVIOR                    

 

           2015                       Ot           611.72                 

           

   

 

           2015                       Ot           611.72                 

           

   

 

             2015           RACH LARA APRVALERIE           Ot           599

.0           

URIN TRACT INFECTION NOS                         

 

                2015           RACH LARA APRN           Ot           

   681.00          

                          CELLULITIS, FINGER NOS                    

 

             2015           RACH LARA APRN           Ot           883

.0           

OPEN WOUND OF FINGER                             

 

             2015           RACH LARA APRN           Ot           883

.1           

OPEN WOUND FINGER-COMPL                          

 

                2015           RACH LARA APRN           Ot           

   E000.8          

                          OTHER EXTERNAL CAUSE STATUS                    

 

                2015           RACH LARA APRN           Ot           

   E849.0          

                          ACCIDENT IN HOME                    

 

                2015           RACH LARA           Ot           

   E920.8          

                          ACC-CUTTING INSTRUM NEC                    

 

             2017                        Ot           611.72           LUM

P OR MASS IN 

BREAST                                           

 

                2017           VAL TRAMMELL, NORMAN RANGEL Ot     

         O32.1XX0  

                          MATERNAL CARE FOR BREECH PRESENTATION, U              

      

 

                2017           NORMAN NEAL MD, Ot     

         O41.03X0  

                          OLIGOHYDRAMNIOS, THIRD TRIMESTER, NOT AP              

      

 

                2017           VAL TRAMMELL, NORMAN RANGEL           Ot     

         O99.824   

                          STREPTOCOCCUS B CARRIER STATE COMPLICATI              

      

 

                2017           NORMAN NEAL MD, Ot     

         Z23       

                          ENCOUNTER FOR IMMUNIZATION                    

 

                2017           VAL TRAMMELL, NORAMN RANGEL Ot     

         Z37.0     

                          SINGLE LIVE BIRTH                    

 

                2017           NORMAN NEAL MD, Ot     

         Z3A.38    

                          38 WEEKS GESTATION OF PREGNANCY                    

 

                2018           RACH LARA           Ot           

   N83.291         

                          OTHER OVARIAN CYST, RIGHT SIDE                    

 

             2018           RACH LARA           Ot           O20

.0           

THREATENED                               

 

                2018           RACH LARA           Ot           

   O34.80          

                          MATERNAL CARE FOR OTH ABNLT OF PELVIC OR              

      

 

                2018           RACH LARA           Ot           

   O99.89          

                          OTH DISEASES AND CONDITIONS COMPL PREG/C              

      

 

                2018           RACH LARA           Ot           

   Z3A.00          

                          WEEKS OF GESTATION OF PREGNANCY NOT SPEC              

      

 

                2018           RACH LARA APRVALERIE           Ot           

   N83.291         

                          OTHER OVARIAN CYST, RIGHT SIDE                    

 

             2018           RACH LARA           Ot           O20

.0           

THREATENED                               

 

                2018           RACH LARA APRVALERIE           Ot           

   O34.80          

                          MATERNAL CARE FOR OTH ABNLT OF PELVIC OR              

      

 

                2018           RACH LARA APRN           Ot           

   O99.89          

                          OTH DISEASES AND CONDITIONS COMPL PREG/C              

      

 

                2018           RACH LARA APRN           Ot           

   Z3A.00          

                          WEEKS OF GESTATION OF PREGNANCY NOT SPEC              

      

 

             2018           ANGELA SHEARERP           Ot           O20.0   

        

THREATENED                               

 

             2018           ANGELA SHEARERP           Ot           O20.9   

        

HEMORRHAGE IN EARLY PREGNANCY, UNSPECIFI                    

 

             2018           ANGELA SHEARER ARNP           Ot           Z3A.01  

         LESS

THAN 8 WEEKS GESTATION OF PREGNANCY                    

 

             2018           ANGELA SHEARER           Ot           Z87.59  

         

PERSONAL HISTORY OF COMP OF PREG, CHLDBR                    

 

             2018           ANGELA SHEARER           Ot           Z90.89  

         

ACQUIRED ABSENCE OF OTHER ORGANS                    

 

                2018           TC ALEMAN DO           Ot          

    R10.2          

                          PELVIC AND PERINEAL PAIN                    

 

                2018           TC ALEMAN DO           Ot          

    Z3A.01         

                          LESS THAN 8 WEEKS GESTATION OF PREGNANCY              

      

 

             2018                        Ot           O03.9           COMP

LETE OR UNSP 

SPONTANEOUS  WI                          

 

             2018                        Ot           O99.89           OTH

 DISEASES AND 

CONDITIONS COMPL PREG/C                          

 

             2018                        Ot           Z3A.11           11 

WEEKS 

GESTATION OF PREGNANCY                           

 

             2018                        Ot           Z90.89           ACQ

UIRED ABSENCE 

OF OTHER ORGANS                                  

 

             2018                        Ot           Z98.890           OT

HER SPECIFIED 

POSTPROCEDURAL STATES                            

 

             2018                        Ot           O03.4           INCO

MPLETE 

SPONTANEOUS  WITHOUT                     

 

             2018                        Ot           Z87.59           PER

MUNA HISTORY 

OF COMP OF PREG, CHLDBR                          

 

             2018                        Ot           Z90.89           ACQ

UIRED ABSENCE 

OF OTHER ORGANS                                  

 

             2018                        Ot           O03.9           COMP

LETE OR UNSP 

SPONTANEOUS  WI                          

 

             2018                        Ot           O99.89           OTH

 DISEASES AND 

CONDITIONS COMPL PREG/C                          

 

             2018                        Ot           Z3A.11           11 

WEEKS 

GESTATION OF PREGNANCY                           

 

             2018                        Ot           Z90.89           ACQ

UIRED ABSENCE 

OF OTHER ORGANS                                  

 

             2018                        Ot           Z98.890           OT

HER SPECIFIED 

POSTPROCEDURAL STATES                            

 

             2018                        Ot           O03.4           INCO

MPLETE 

SPONTANEOUS  WITHOUT                     

 

             2018                        Ot           Z87.59           PER

MUNA HISTORY 

OF COMP OF PREG, CHLDBR                          

 

             2018                        Ot           Z90.89           ACQ

UIRED ABSENCE 

OF OTHER ORGANS                                  

 

                2018           RACH LARA           Ot           

   S81.811A        

                          LACERATION W/O FOREIGN BODY, RIGHT LOWER              

      

 

                2018           RACH LARA           Ot           

   W19.XXXA        

                          UNSPECIFIED FALL, INITIAL ENCOUNTER                   

 

 

                2018           RACH LARA           Ot           

   Z90.89          

                          ACQUIRED ABSENCE OF OTHER ORGANS                    

 

                2018           RACH LARA           Ot           

   Z98.890         

                          OTHER SPECIFIED POSTPROCEDURAL STATES                 

   

 

                2018           TC ALEMAN DO           Ot          

    R10.2          

                          PELVIC AND PERINEAL PAIN                    

 

                2018           ZHANGECH DOTC S           Ot          

    Z3A.01         

                          LESS THAN 8 WEEKS GESTATION OF PREGNANCY              

      

 

                2018           RACH LARA           Ot           

   S81.811A        

                          LACERATION W/O FOREIGN BODY, RIGHT LOWER              

      

 

                2018           RACH LARA           Ot           

   W19.XXXA        

                          UNSPECIFIED FALL, INITIAL ENCOUNTER                   

 

 

                2018           RACH LARA           Ot           

   Z90.89          

                          ACQUIRED ABSENCE OF OTHER ORGANS                    

 

                2018           RACH LARA           Ot           

   Z98.890         

                          OTHER SPECIFIED POSTPROCEDURAL STATES                 

   

 

             2018           DOROTHY ESTRELLA           Ot           M25.562 

          

PAIN IN LEFT KNEE                                

 

             2018           BERNDOROTHY AGUDELO           Ot           M79.662 

          

PAIN IN LEFT LOWER LEG                           

 

             2018           DOROTHY ESTRELLA           Ot           Z90.89  

         

ACQUIRED ABSENCE OF OTHER ORGANS                    

 

             2018           BERNDOROTHY AGUDELO           Ot           Z98.890 

          

OTHER SPECIFIED POSTPROCEDURAL STATES                    

 

                2018           TC ALEMAN DO S           Ot          

    R10.2          

                          PELVIC AND PERINEAL PAIN                    

 

                2018           LOVE SCHROEDER, TC S           Ot          

    Z3A.01         

                          LESS THAN 8 WEEKS GESTATION OF PREGNANCY              

      

 

                2018           RACH LARA           Ot           

   S81.811A        

                          LACERATION W/O FOREIGN BODY, RIGHT LOWER              

      

 

                2018           RACH LARA           Ot           

   W19.XXXA        

                          UNSPECIFIED FALL, INITIAL ENCOUNTER                   

 

 

                2018           RACH LARA           Ot           

   Z90.89          

                          ACQUIRED ABSENCE OF OTHER ORGANS                    

 

                2018           RACH LARA           Ot           

   Z98.890         

                          OTHER SPECIFIED POSTPROCEDURAL STATES                 

   

 

             2018           DOROTHY ESTRELLA           Ot           M25.562 

          

PAIN IN LEFT KNEE                                

 

             2018           DOROTHY ESTRELLA           Ot           M79.662 

          

PAIN IN LEFT LOWER LEG                           

 

             2018           ELÍAS ESTRELLAIS           Ot           Z90.89  

         

ACQUIRED ABSENCE OF OTHER ORGANS                    

 

             2018           DORTOHY ESTRELLA           Ot           Z98.890 

          

OTHER SPECIFIED POSTPROCEDURAL STATES                    

 

             2018           MISA TRAMMELL, MATT ROMERO           Ot           N92.

6           

IRREGULAR MENSTRUATION, UNSPECIFIED                    

 

             2018           MISA TRAMMELL, MATT ROMERO           Ot           N93.

9           

ABNORMAL UTERINE AND VAGINAL BLEEDING, U                    

 

                2018           MISA TRAMMELL, MATT ROMERO           Ot            

  Z87.448          

                          PERSONAL HISTORY OF OTHER DISEASES OF UR              

      

 

             2018           MISA TRAMMELL, MATT ROMERO           Ot           Z90.

89           

ACQUIRED ABSENCE OF OTHER ORGANS                    

 

                2018           MISA TRAMMELL, MATT ROMERO           Ot            

  Z98.890          

                          OTHER SPECIFIED POSTPROCEDURAL STATES                 

   

 

                10/06/2019           FENECH DO, TC S           Ot          

    R10.2          

                          PELVIC AND PERINEAL PAIN                    

 

                10/06/2019           FENECH DO, TC S           Ot          

    Z3A.01         

                          LESS THAN 8 WEEKS GESTATION OF PREGNANCY              

      

 

             10/06/2019           MANFRED, ANGELA ARNP           Ot           N39.0   

        

URINARY TRACT INFECTION, SITE NOT SPECIF                    

 

             10/06/2019           MANFRED, ANGELA ARNP           Ot           R10.31  

         

RIGHT LOWER QUADRANT PAIN                        

 

             10/06/2019           MANFRED, ANGELA ARNP           Ot           Z80.41  

         

FAMILY HISTORY OF MALIGNANT NEOPLASM OF                     

 

             10/06/2019           MANFRED, ANGELA ARNP           Ot           Z90.89  

         

ACQUIRED ABSENCE OF OTHER ORGANS                    

 

             10/11/2019           MANFRED, ANGELA ARNP           Ot           N39.0   

        

URINARY TRACT INFECTION, SITE NOT SPECIF                    

 

             10/11/2019           MANFRED, ANGELA ARNP           Ot           R10.31  

         

RIGHT LOWER QUADRANT PAIN                        

 

             10/11/2019           MANFRED, ANGELA ARNP           Ot           Z80.41  

         

FAMILY HISTORY OF MALIGNANT NEOPLASM OF                     

 

             10/11/2019           MANFRED, ANGELA ARNP           Ot           Z90.89  

         

ACQUIRED ABSENCE OF OTHER ORGANS                    

 

                2019           FENECH DO, TC S           Ot          

    R10.2          

                          PELVIC AND PERINEAL PAIN                    

 

                2019           FENECH DO, TC S           Ot          

    Z3A.01         

                          LESS THAN 8 WEEKS GESTATION OF PREGNANCY              

      

 

                2019           MARIA GUADALUPE KARIMI MD T           Ot      

        O20.9      

                          HEMORRHAGE IN EARLY PREGNANCY, UNSPECIFI              

      

 

                2019           MARIA GUADALUPE KARIMI MD T           Ot      

        Z3A.08     

                          8 WEEKS GESTATION OF PREGNANCY                    

 

                2019           MARIA GUADALUPE KARIMI MD           Ot      

        Z90.89     

                          ACQUIRED ABSENCE OF OTHER ORGANS                    

 

                2019           MARIA GUADALUPE KARIMI MD           Ot      

        O20.9      

                          HEMORRHAGE IN EARLY PREGNANCY, UNSPECIFI              

      

 

                2019           MARIA GUADALUPE KARIMI MD T           Ot      

        Z3A.00     

                          WEEKS OF GESTATION OF PREGNANCY NOT SPEC              

      

 

             2020           MANFRED ANGELA ARNP           Ot           O21.0   

        MILD 

HYPEREMESIS GRAVIDARUM                           

 

             2020           MANFRED, ANGELA ARNP           Ot           O23.42  

         UNSP

INFCT OF URINARY TRACT IN PREGNANCY                    

 

             2020           ANGELA SHEARERP           Ot           O44.02  

         

COMPLETE PLACENTA PREVIA NOS OR WITHOUT                     

 

             2020           MANFRED, ANGELA BARROSOP           Ot           Z3A.14  

         14 

WEEKS GESTATION OF PREGNANCY                     

 

             2020           ANGELA SHEARERP           Ot           O21.0   

        MILD 

HYPEREMESIS GRAVIDARUM                           

 

             2020           ANGELA SHEARERP           Ot           O23.42  

         UNSP

INFCT OF URINARY TRACT IN PREGNANCY                    

 

             2020           ANGELA SHEARERP           Ot           O44.02  

         

COMPLETE PLACENTA PREVIA NOS OR WITHOUT                     

 

             2020           MANFRED, ANGELA BARROSOP           Ot           Z3A.14  

         14 

WEEKS GESTATION OF PREGNANCY                     

 

                2020           FENECH DO, TC S           Ot          

    R10.2          

                          PELVIC AND PERINEAL PAIN                    

 

                2020           FENECH DO, TC S           Ot          

    Z3A.01         

                          LESS THAN 8 WEEKS GESTATION OF PREGNANCY              

      

 

                2020           MARIA GUADALUPE KARIMI MD           Ot      

        O20.9      

                          HEMORRHAGE IN EARLY PREGNANCY, UNSPECIFI              

      

 

                2020           MARIA GUADALUPE KARIMI MD           Ot      

        Z3A.00     

                          WEEKS OF GESTATION OF PREGNANCY NOT SPEC              

      

 

                2020           MARIA GUADALUPE KARIMI MD           Ot      

        O20.9      

                          HEMORRHAGE IN EARLY PREGNANCY, UNSPECIFI              

      

 

                2020           MARIA GUADALUPE KARIMI MD           Ot      

        Z3A.00     

                          WEEKS OF GESTATION OF PREGNANCY NOT SPEC              

      

 

                2020           NORMAN NEAL MD, Ot     

         N89.8     

                          OTHER SPECIFIED NONINFLAMMATORY DISORDER              

      

 

                2020           NORMAN NEAL MD, Ot     

         O26.852   

                          SPOTTING COMPLICATING PREGNANCY, SECOND               

      

 

                2020           NORMAN NEAL MD, Ot     

         Z3A.27    

                          27 WEEKS GESTATION OF PREGNANCY                    

 

                2020           NORMAN NEAL MD, Ot     

         M79.661   

                          PAIN IN RIGHT LOWER LEG                    

 

                2020           NORMAN NEAL MD, Ot     

         M79.662   

                          PAIN IN LEFT LOWER LEG                    

 

                2020           NORMAN NEAL MD, Ot     

         M79.89    

                          OTHER SPECIFIED SOFT TISSUE DISORDERS                 

   



                                                                                
                                                                                
                                                                                
                                 



Procedures

      



                Code            Description           Performed By           Per

rolando On        

 

                                      [id number]                                 EX

TRACTION OF POC, LOW 

CERVICAL, OPEN AP                                               2017      

  



                  



Results

      



                    Test                Result              Range        

 

                                        Complete blood count (CBC) with automate

d white blood cell (WBC) differential - 

17 09:35         

 

                          Blood leukocytes automated count (number/volume)      

     10.3 10*3/uL         

                                        4.3-11.0        

 

                          Blood erythrocytes automated count (number/volume)    

       4.18 10*6/uL       

                                        4.35-5.85        

 

                    Venous blood hemoglobin measurement (mass/volume)           

12.5 g/dL           

11.5-16.0        

 

                    Blood hematocrit (volume fraction)           36 %           

     35-52        

 

                    Automated erythrocyte mean corpuscular volume           86 [

foz_us]           

80-99        

 

                                        Automated erythrocyte mean corpuscular h

emoglobin (mass per erythrocyte)        

                          30 pg                     25-34        

 

                                        Automated erythrocyte mean corpuscular h

emoglobin concentration measurement 

(mass/volume)             35 g/dL                   32-36        

 

                    Automated erythrocyte distribution width ratio           12.

8 %              10.0-

14.5        

 

                    Automated blood platelet count (count/volume)           179 

10*3/uL           

130-400        

 

                          Automated blood platelet mean volume measurement      

     11.1 [foz_us]        

                                        7.4-10.4        

 

                    Automated blood neutrophils/100 leukocytes           78 %   

             42-75       

 

 

                    Automated blood lymphocytes/100 leukocytes           15 %   

             12-44       

 

 

                    Blood monocytes/100 leukocytes           7 %                

 0-12        

 

                    Automated blood eosinophils/100 leukocytes           1 %    

             0-10        

 

                    Automated blood basophils/100 leukocytes           0 %      

           0-10        

 

                    Blood neutrophils automated count (number/volume)           

8.0 10*3            

1.8-7.8        

 

                    Blood lymphocytes automated count (number/volume)           

1.5 10*3            

1.0-4.0        

 

                    Blood monocytes automated count (number/volume)           0.

7 10*3            

0.0-1.0        

 

                    Automated eosinophil count           0.1 10*3/uL           0

.0-0.3        

 

                    Automated blood basophil count (count/volume)           0.0 

10*3/uL           

0.0-0.1        

 

                                        Blood type T Indirect antibody screen pa

chiquita - 17 09:35         

 

                    ABO+Rh group           BP                  NRG        

 

                    Transfusion band number           [id number]             NRG   

     

 

                    Blood group antibody screen           NEGATIVE            NR

G        

 

                                        Complete blood count (CBC) with automate

d white blood cell (WBC) differential - 

18 10:49         

 

                          Blood leukocytes automated count (number/volume)      

     6.3 10*3/uL          

                                        4.3-11.0        

 

                          Blood erythrocytes automated count (number/volume)    

       4.96 10*6/uL       

                                        4.35-5.85        

 

                    Venous blood hemoglobin measurement (mass/volume)           

15.2 g/dL           

11.5-16.0        

 

                    Blood hematocrit (volume fraction)           43 %           

     35-52        

 

                    Automated erythrocyte mean corpuscular volume           87 [

foz_us]           

80-99        

 

                                        Automated erythrocyte mean corpuscular h

emoglobin (mass per erythrocyte)        

                          31 pg                     25-34        

 

                                        Automated erythrocyte mean corpuscular h

emoglobin concentration measurement 

(mass/volume)             35 g/dL                   32-36        

 

                    Automated erythrocyte distribution width ratio           12.

5 %              10.0-

14.5        

 

                    Automated blood platelet count (count/volume)           271 

10*3/uL           

130-400        

 

                          Automated blood platelet mean volume measurement      

     10.8 [foz_us]        

                                        7.4-10.4        

 

                    Automated blood neutrophils/100 leukocytes           67 %   

             42-75       

 

 

                    Automated blood lymphocytes/100 leukocytes           22 %   

             12-44       

 

 

                    Blood monocytes/100 leukocytes           9 %                

 0-12        

 

                    Automated blood eosinophils/100 leukocytes           3 %    

             0-10        

 

                    Automated blood basophils/100 leukocytes           0 %      

           0-10        

 

                    Blood neutrophils automated count (number/volume)           

4.3 10*3            

1.8-7.8        

 

                    Blood lymphocytes automated count (number/volume)           

1.4 10*3            

1.0-4.0        

 

                    Blood monocytes automated count (number/volume)           0.

5 10*3            

0.0-1.0        

 

                    Automated eosinophil count           0.2 10*3/uL           0

.0-0.3        

 

                    Automated blood basophil count (count/volume)           0.0 

10*3/uL           

0.0-0.1        

 

                                        Comprehensive metabolic panel - 18

 10:49         

 

                          Serum or plasma sodium measurement (moles/volume)     

      140 mmol/L          

                                        135-145        

 

                          Serum or plasma potassium measurement (moles/volume)  

         3.5 mmol/L       

                                        3.6-5.0        

 

                          Serum or plasma chloride measurement (moles/volume)   

        106 mmol/L        

                                                

 

                    Carbon dioxide           24 mmol/L           21-32        

 

                          Serum or plasma anion gap determination (moles/volume)

           10 mmol/L      

                                        5-14        

 

                          Serum or plasma urea nitrogen measurement (mass/volume

)           6 mg/dL       

                                        7-18        

 

                          Serum or plasma creatinine measurement (mass/volume)  

         0.68 mg/dL       

                                        0.60-1.30        

 

                    Serum or plasma urea nitrogen/creatinine mass ratio         

  9                   NRG  

      

 

                                        Serum or plasma creatinine measurement w

ith calculation of estimated glomerular 

filtration rate           >                         NRG        

 

                    Serum or plasma glucose measurement (mass/volume)           

94 mg/dL            

        

 

                    Serum or plasma calcium measurement (mass/volume)           

9.9 mg/dL           

8.5-10.1        

 

                          Serum or plasma total bilirubin measurement (mass/volu

me)           1.0 mg/dL   

                                        0.1-1.0        

 

                                        Serum or plasma alkaline phosphatase elizabeth

surement (enzymatic activity/volume)    

                          51 U/L                            

 

                                        Serum or plasma aspartate aminotransfera

se measurement (enzymatic 

activity/volume)           16 U/L                    5-34        

 

                                        Serum or plasma alanine aminotransferase

 measurement (enzymatic activity/volume)

                          12 U/L                    0-55        

 

                    Serum or plasma protein measurement (mass/volume)           

7.5 g/dL            

6.4-8.2        

 

                    Serum or plasma albumin measurement (mass/volume)           

5.0 g/dL            

3.2-4.5        

 

                                        Serum or plasma choriogonadotropin measu

rement (units/volume) - 18 10:49  

       

 

                          Serum or plasma choriogonadotropin measurement (units/

volume)           1076 

m[iU]/mL                                <5        

 

                                        Complete urinalysis with reflex to cultu

re - 18 10:55         

 

                    Urine color determination           YELLOW              NRG 

       

 

                    Urine clarity determination           CLEAR               NR

G        

 

                    Urine pH measurement by test strip           7              

     5-9        

 

                    Specific gravity of urine by test strip           1.010     

          1.016-1.022  

      

 

                          Urine protein assay by test strip, semi-quantitative  

         NEGATIVE         

                                        NEGATIVE        

 

                    Urine glucose detection by automated test strip           NE

GATIVE            

NEGATIVE        

 

                          Erythrocytes detection in urine sediment by light micr

oscopy           NEGATIVE 

                                        NEGATIVE        

 

                    Urine ketones detection by automated test strip           3+

                  NEGATIVE

        

 

                    Urine nitrite detection by test strip           NEGATIVE    

        NEGATIVE    

    

 

                    Urine total bilirubin detection by test strip           NEGA

TIVE            

NEGATIVE        

 

                          Urine urobilinogen measurement by automated test strip

 (mass/volume)           

NORMAL                                  NORMAL        

 

                    Urine leukocyte esterase detection by dipstick           1+ 

                 NEGATIVE 

       

 

                                        Automated urine sediment erythrocyte cou

nt by microscopy (number/high power 

field)                    NONE                      NRG        

 

                                        Automated urine sediment leukocyte count

 by microscopy (number/high power field)

                          RARE                      NRG        

 

                          Bacteria detection in urine sediment by light microsco

py           NEGATIVE     

                                        NRG        

 

                                        Squamous epithelial cells detection in u

rine sediment by light microscopy       

                          0-2                       NRG        

 

                          Crystals detection in urine sediment by light microsco

py           NONE         

                                        NRG        

 

                    Casts detection in urine sediment by light microscopy       

    NONE                

NRG        

 

                          Mucus detection in urine sediment by light microscopy 

          NEGATIVE        

                                        NRG        

 

                    Complete urinalysis with reflex to culture           NO     

             NRG        

 

                                        Complete blood count (CBC) with automate

d white blood cell (WBC) differential - 

18 17:05         

 

                          Blood leukocytes automated count (number/volume)      

     6.1 10*3/uL          

                                        4.3-11.0        

 

                          Blood erythrocytes automated count (number/volume)    

       4.53 10*6/uL       

                                        4.35-5.85        

 

                    Venous blood hemoglobin measurement (mass/volume)           

13.8 g/dL           

11.5-16.0        

 

                    Blood hematocrit (volume fraction)           39 %           

     35-52        

 

                    Automated erythrocyte mean corpuscular volume           87 [

foz_us]           

80-99        

 

                                        Automated erythrocyte mean corpuscular h

emoglobin (mass per erythrocyte)        

                          31 pg                     25-34        

 

                                        Automated erythrocyte mean corpuscular h

emoglobin concentration measurement 

(mass/volume)             35 g/dL                   32-36        

 

                    Automated erythrocyte distribution width ratio           12.

5 %              10.0-

14.5        

 

                    Automated blood platelet count (count/volume)           269 

10*3/uL           

130-400        

 

                          Automated blood platelet mean volume measurement      

     10.8 [foz_us]        

                                        7.4-10.4        

 

                    Automated blood neutrophils/100 leukocytes           54 %   

             42-75       

 

 

                    Automated blood lymphocytes/100 leukocytes           32 %   

             12-44       

 

 

                    Blood monocytes/100 leukocytes           11 %               

 0-12        

 

                    Automated blood eosinophils/100 leukocytes           4 %    

             0-10        

 

                    Automated blood basophils/100 leukocytes           1 %      

           0-10        

 

                    Blood neutrophils automated count (number/volume)           

3.3 10*3            

1.8-7.8        

 

                    Blood lymphocytes automated count (number/volume)           

1.9 10*3            

1.0-4.0        

 

                    Blood monocytes automated count (number/volume)           0.

6 10*3            

0.0-1.0        

 

                    Automated eosinophil count           0.2 10*3/uL           0

.0-0.3        

 

                    Automated blood basophil count (count/volume)           0.0 

10*3/uL           

0.0-0.1        

 

                                        Serum or plasma choriogonadotropin (preg

rosa test) detection - 18 17:05  

       

 

                          Serum or plasma choriogonadotropin (pregnancy test) de

tection           POSITIVE

                                        NEGATIVE        

 

                                        Comprehensive metabolic panel - 18

 17:05         

 

                          Serum or plasma sodium measurement (moles/volume)     

      141 mmol/L          

                                        135-145        

 

                          Serum or plasma potassium measurement (moles/volume)  

         3.3 mmol/L       

                                        3.6-5.0        

 

                          Serum or plasma chloride measurement (moles/volume)   

        107 mmol/L        

                                                

 

                    Carbon dioxide           25 mmol/L           21-32        

 

                          Serum or plasma anion gap determination (moles/volume)

           9 mmol/L       

                                        5-14        

 

                          Serum or plasma urea nitrogen measurement (mass/volume

)           8 mg/dL       

                                        7-18        

 

                          Serum or plasma creatinine measurement (mass/volume)  

         0.73 mg/dL       

                                        0.60-1.30        

 

                    Serum or plasma urea nitrogen/creatinine mass ratio         

  11                  NRG 

       

 

                                        Serum or plasma creatinine measurement w

ith calculation of estimated glomerular 

filtration rate           >                         NRG        

 

                    Serum or plasma glucose measurement (mass/volume)           

124 mg/dL           

        

 

                    Serum or plasma calcium measurement (mass/volume)           

9.5 mg/dL           

8.5-10.1        

 

                          Serum or plasma total bilirubin measurement (mass/volu

me)           0.7 mg/dL   

                                        0.1-1.0        

 

                                        Serum or plasma alkaline phosphatase elizabeth

surement (enzymatic activity/volume)    

                          50 U/L                            

 

                                        Serum or plasma aspartate aminotransfera

se measurement (enzymatic 

activity/volume)           12 U/L                    5-34        

 

                                        Serum or plasma alanine aminotransferase

 measurement (enzymatic activity/volume)

                          9 U/L                     0-55        

 

                    Serum or plasma protein measurement (mass/volume)           

7.0 g/dL            

6.4-8.2        

 

                    Serum or plasma albumin measurement (mass/volume)           

4.8 g/dL            

3.2-4.5        

 

                                        Serum or plasma choriogonadotropin measu

rement (units/volume) - 18 17:05  

       

 

                          Serum or plasma choriogonadotropin measurement (units/

volume)           3410 

m[iU]/mL                                <5        

 

                                        CULTURE, GENITAL - 18 09:55       

  

 

                    CULTURE, GENITAL           SEE NOTE            NRG        

 

                                        GC/CHLAMYDIA (SWAB OR URINE)-RAPID -  09:55         

 

                    CHLAMYDIA TRACHOMATIS RNA, TMA           NOT DETECTED       

     NOT DETECTED   

     

 

                    NEISSERIA GONORRHOEAE RNA, TMA           NOT DETECTED       

     NOT DETECTED   

     

 

                    COMMENT                                 NRG        

 

                                        SUREPATH PAP RFX HPV mRNA E6/E7 -  09:55         

 

                    CLINICAL INFORMATION:           Pregnant            NRG     

   

 

                    LMP:                18            NRG        

 

                    PREV. PAP:           WNL                 NRG        

 

                    PREV. BX:           NONE                NRG        

 

                    SOURCE:             [employer]            NRG        

 

                    STATEMENT OF ADEQUACY:                               NRG    

    

 

                    INTERPRETATION/RESULT:                               NRG    

    

 

                    CYTOTECHNOLOGIST:                               NRG        

 

                    COMMENT                                 NRG        

 

                                        QNATAL - 18 13:52         

 

                    NUMBER OF FETUSES?           1                   NRG        

 

                    ADVANCED MATERNAL AGE?           NO                  NRG    

    

 

                    ABNORMAL SABINE?           NO                  NRG        

 

                    ABNORMAL US?           NO                  NRG        

 

                    PERSONAL/FAM HISTORY?           NO                  NRG     

   

 

                    INTERPRETATION           SEE NOTE            NRG        

 

                    TRISOMY 21 (T21)           Negative            NRG        

 

                    TRISOMY 18 (T18)           Negative            NRG        

 

                    TRISOMY 13 (T13)           Negative            NRG        

 

                    Y CHROMOSOME           Detected            NRG        

 

                    Y CHR. INTERPRETATION           SEE NOTE            NRG     

   

 

                    SEX CHROMOSOME           No aneuploidy            NRG       

 

 

                    SEX CHROMOSOME INTERP           SEE NOTE            NRG     

   

 

                    MICRODELETION           Not detected            NRG        

 

                    MICRODELETION INTERP           SEE NOTE            NRG      

  

 

                    GESTATIONAL AGE(IN WEEKS)           11                  NRG 

       

 

                    GESTATIONAL AGE (IN DAYS)           1                   NRG 

       

 

                    FETAL FRACTION           6.69%               NRG        

 

                    LABORATORY COMMENTS           SEE NOTE            NRG       

 

 

                    LIMITATIONS           SEE NOTE            NRG        

 

                    SPECIFICATIONS           SEE NOTE            NRG        

 

                    METHODOLOGY           SEE NOTE            NRG        

 

                                        Complete blood count (CBC) with automate

d white blood cell (WBC) differential - 

18 15:47         

 

                          Blood leukocytes automated count (number/volume)      

     5.8 10*3/uL          

                                        4.3-11.0        

 

                          Blood erythrocytes automated count (number/volume)    

       5.18 10*6/uL       

                                        4.35-5.85        

 

                    Venous blood hemoglobin measurement (mass/volume)           

15.6 g/dL           

11.5-16.0        

 

                    Blood hematocrit (volume fraction)           45 %           

     35-52        

 

                    Automated erythrocyte mean corpuscular volume           87 [

foz_us]           

80-99        

 

                                        Automated erythrocyte mean corpuscular h

emoglobin (mass per erythrocyte)        

                          30 pg                     25-34        

 

                                        Automated erythrocyte mean corpuscular h

emoglobin concentration measurement 

(mass/volume)             35 g/dL                   32-36        

 

                    Automated erythrocyte distribution width ratio           12.

8 %              10.0-

14.5        

 

                    Automated blood platelet count (count/volume)           259 

10*3/uL           

130-400        

 

                          Automated blood platelet mean volume measurement      

     11.7 [foz_us]        

                                        7.4-10.4        

 

                    Automated blood neutrophils/100 leukocytes           55 %   

             42-75       

 

 

                    Automated blood lymphocytes/100 leukocytes           32 %   

             12-44       

 

 

                    Blood monocytes/100 leukocytes           9 %                

 0-12        

 

                    Automated blood eosinophils/100 leukocytes           3 %    

             0-10        

 

                    Automated blood basophils/100 leukocytes           1 %      

           0-10        

 

                    Blood neutrophils automated count (number/volume)           

3.2 10*3            

1.8-7.8        

 

                    Blood lymphocytes automated count (number/volume)           

1.8 10*3            

1.0-4.0        

 

                    Blood monocytes automated count (number/volume)           0.

5 10*3            

0.0-1.0        

 

                    Automated eosinophil count           0.2 10*3/uL           0

.0-0.3        

 

                    Automated blood basophil count (count/volume)           0.0 

10*3/uL           

0.0-0.1        

 

                                        Comprehensive metabolic panel - 18

 15:47         

 

                          Serum or plasma sodium measurement (moles/volume)     

      142 mmol/L          

                                        135-145        

 

                          Serum or plasma potassium measurement (moles/volume)  

         3.4 mmol/L       

                                        3.6-5.0        

 

                          Serum or plasma chloride measurement (moles/volume)   

        102 mmol/L        

                                                

 

                    Carbon dioxide           31 mmol/L           21-32        

 

                          Serum or plasma anion gap determination (moles/volume)

           9 mmol/L       

                                        5-14        

 

                          Serum or plasma urea nitrogen measurement (mass/volume

)           7 mg/dL       

                                        7-18        

 

                          Serum or plasma creatinine measurement (mass/volume)  

         0.69 mg/dL       

                                        0.60-1.30        

 

                    Serum or plasma urea nitrogen/creatinine mass ratio         

  10                  NRG 

       

 

                                        Serum or plasma creatinine measurement w

ith calculation of estimated glomerular 

filtration rate           >                         NRG        

 

                    Serum or plasma glucose measurement (mass/volume)           

70 mg/dL            

        

 

                          Serum or plasma calcium measurement (mass/volume)     

      10.4 mg/dL          

                                        8.5-10.1        

 

                          Serum or plasma total bilirubin measurement (mass/volu

me)           0.4 mg/dL   

                                        0.1-1.0        

 

                                        Serum or plasma alkaline phosphatase elizabeth

surement (enzymatic activity/volume)    

                          54 U/L                            

 

                                        Serum or plasma aspartate aminotransfera

se measurement (enzymatic 

activity/volume)           17 U/L                    5-34        

 

                                        Serum or plasma alanine aminotransferase

 measurement (enzymatic activity/volume)

                          16 U/L                    0-55        

 

                    Serum or plasma protein measurement (mass/volume)           

8.3 g/dL            

6.4-8.2        

 

                    Serum or plasma albumin measurement (mass/volume)           

5.1 g/dL            

3.2-4.5        

 

                                        THYROID STIMULATING HORMONE - 18 1

5:47         

 

                    THYROID STIMULATING HORMONE           0.57 u[iU]/mL         

  0.35-4.94        

 

                                        Serum or plasma C reactive protein measu

rement (mass/volume) - 18 15:47   

      

 

                          Serum or plasma C reactive protein measurement (mass/v

olume)           0.04 

mg/dL                                   0.00-0.50        

 

                                        Complete urinalysis with reflex to cultu

re - 18 15:50         

 

                    Urine color determination           YELLOW              NRG 

       

 

                    Urine clarity determination           VERY CLOUDY           

 NRG        

 

                    Urine pH measurement by test strip           7              

     5-9        

 

                    Specific gravity of urine by test strip           1.015     

          1.016-1.022  

      

 

                    Urine protein assay by test strip, semi-quantitative        

   1+                  

NEGATIVE        

 

                    Urine glucose detection by automated test strip           NE

GATIVE            

NEGATIVE        

 

                          Erythrocytes detection in urine sediment by light micr

oscopy           5+       

                                        NEGATIVE        

 

                    Urine ketones detection by automated test strip           NE

GATIVE            

NEGATIVE        

 

                    Urine nitrite detection by test strip           NEGATIVE    

        NEGATIVE    

    

 

                    Urine total bilirubin detection by test strip           NEGA

TIVE            

NEGATIVE        

 

                          Urine urobilinogen measurement by automated test strip

 (mass/volume)           

NORMAL                                  NORMAL        

 

                    Urine leukocyte esterase detection by dipstick           1+ 

                 NEGATIVE 

       

 

                                        Automated urine sediment erythrocyte cou

nt by microscopy (number/high power 

field)                    TNTC                      NRG        

 

                                        Automated urine sediment leukocyte count

 by microscopy (number/high power field)

                           [HPF]                    NRG        

 

                          Bacteria detection in urine sediment by light microsco

py           FEW          

                                        NRG        

 

                          Crystals detection in urine sediment by light microsco

py           NONE         

                                        NRG        

 

                    Casts detection in urine sediment by light microscopy       

    NONE                

NRG        

 

                          Mucus detection in urine sediment by light microscopy 

          NEGATIVE        

                                        NRG        

 

                    Complete urinalysis with reflex to culture           NO     

             NRG        

 

                                        Microscopic examination by wet preparati

on - 18 17:15         

 

                    WET PREP RESULTS           OCCASIONAL WBC            NRG    

    

 

                                        Chlamydia trachomatis DNA detection by p

robe and signal amplification method - 

18 17:15         

 

                                        Chlamydia trachomatis DNA detection by p

robe and target amplification method    

                          Not Detected              Not Detected        

 

                                        Neisseria gonorrhoeae DNA detection by p

robe and signal amplification method - 

18 17:15         

 

                    Gonorrhea amp DNA-urine           Not Detected            No

t Detected        

 

                                        Complete urinalysis with reflex to cultu

re - 10/06/19 15:05         

 

                    Urine color determination           YELLOW              NRG 

       

 

                    Urine clarity determination           CLEAR               NR

G        

 

                    Urine pH measurement by test strip           6              

     5-9        

 

                    Specific gravity of urine by test strip           1.020     

          1.016-1.022  

      

 

                    Urine protein assay by test strip, semi-quantitative        

   3+                  

NEGATIVE        

 

                    Urine glucose detection by automated test strip           NE

GATIVE            

NEGATIVE        

 

                          Erythrocytes detection in urine sediment by light micr

oscopy           4+       

                                        NEGATIVE        

 

                    Urine ketones detection by automated test strip           1+

                  NEGATIVE

        

 

                    Urine nitrite detection by test strip           NEGATIVE    

        NEGATIVE    

    

 

                    Urine total bilirubin detection by test strip           NEGA

TIVE            

NEGATIVE        

 

                          Urine urobilinogen measurement by automated test strip

 (mass/volume)           

NORMAL                                  NORMAL        

 

                    Urine leukocyte esterase detection by dipstick           3+ 

                 NEGATIVE 

       

 

                                        Automated urine sediment erythrocyte cou

nt by microscopy (number/high power 

field)                     [HPF]                    NRG        

 

                                        Automated urine sediment leukocyte count

 by microscopy (number/high power field)

                          TNTC                      NRG        

 

                          Bacteria detection in urine sediment by light microsco

py           MODERATE     

                                        NRG        

 

                                        Squamous epithelial cells detection in u

rine sediment by light microscopy       

                          5-10                      NRG        

 

                          Crystals detection in urine sediment by light microsco

py           NONE         

                                        NRG        

 

                    Casts detection in urine sediment by light microscopy       

    NONE                

NRG        

 

                          Mucus detection in urine sediment by light microscopy 

          NEGATIVE        

                                        NRG        

 

                    Complete urinalysis with reflex to culture           YES    

             NRG        

 

                                        Bacterial urine culture - 10/06/19 15:05

         

 

                    Bacterial urine culture           3 OR MORE            NRG  

      

 

                    COLONY COUNT           40,000 CFU/ML            NRG        

 

                    FTX;REPORTABLE           (GRAM POSITIVE) SUGGESTING PROBABLE

            NRG     

   

 

                    FREE TEXT ENTRY 2           COLLECTION CONTAMINATION WITH SK

IN            NRG   

     

 

                    FREE TEXT ENTRY 3           CHLOE.  NO SUSCEPTIBILITY PERFOR

MED            NRG  

      

 

                                        Complete blood count (CBC) with automate

d white blood cell (WBC) differential - 

10/06/19 15:13         

 

                          Blood leukocytes automated count (number/volume)      

     11.4 10*3/uL         

                                        4.3-11.0        

 

                          Blood erythrocytes automated count (number/volume)    

       4.96 10*6/uL       

                                        4.35-5.85        

 

                    Venous blood hemoglobin measurement (mass/volume)           

15.3 g/dL           

11.5-16.0        

 

                    Blood hematocrit (volume fraction)           43 %           

     35-52        

 

                    Automated erythrocyte mean corpuscular volume           87 [

foz_us]           

80-99        

 

                                        Automated erythrocyte mean corpuscular h

emoglobin (mass per erythrocyte)        

                          31 pg                     25-34        

 

                                        Automated erythrocyte mean corpuscular h

emoglobin concentration measurement 

(mass/volume)             35 g/dL                   32-36        

 

                    Automated erythrocyte distribution width ratio           12.

5 %              10.0-

14.5        

 

                    Automated blood platelet count (count/volume)           326 

10*3/uL           

130-400        

 

                          Automated blood platelet mean volume measurement      

     10.7 [foz_us]        

                                        7.4-10.4        

 

                    Automated blood neutrophils/100 leukocytes           77 %   

             42-75       

 

 

                    Automated blood lymphocytes/100 leukocytes           14 %   

             12-44       

 

 

                    Blood monocytes/100 leukocytes           6 %                

 0-12        

 

                    Automated blood eosinophils/100 leukocytes           2 %    

             0-10        

 

                    Automated blood basophils/100 leukocytes           0 %      

           0-10        

 

                    Blood neutrophils automated count (number/volume)           

8.8 10*3            

1.8-7.8        

 

                    Blood lymphocytes automated count (number/volume)           

1.6 10*3            

1.0-4.0        

 

                    Blood monocytes automated count (number/volume)           0.

7 10*3            

0.0-1.0        

 

                    Automated eosinophil count           0.3 10*3/uL           0

.0-0.3        

 

                    Automated blood basophil count (count/volume)           0.0 

10*3/uL           

0.0-0.1        

 

                                        Comprehensive metabolic panel - 10/06/19

 15:13         

 

                          Serum or plasma sodium measurement (moles/volume)     

      145 mmol/L          

                                        135-145        

 

                          Serum or plasma potassium measurement (moles/volume)  

         3.8 mmol/L       

                                        3.6-5.0        

 

                          Serum or plasma chloride measurement (moles/volume)   

        106 mmol/L        

                                                

 

                    Carbon dioxide           26 mmol/L           21-32        

 

                          Serum or plasma anion gap determination (moles/volume)

           13 mmol/L      

                                        5-14        

 

                          Serum or plasma urea nitrogen measurement (mass/volume

)           8 mg/dL       

                                        7-18        

 

                          Serum or plasma creatinine measurement (mass/volume)  

         0.72 mg/dL       

                                        0.60-1.30        

 

                    Serum or plasma urea nitrogen/creatinine mass ratio         

  11                  NRG 

       

 

                                        Serum or plasma creatinine measurement w

ith calculation of estimated glomerular 

filtration rate           >                         NRG        

 

                    Serum or plasma glucose measurement (mass/volume)           

98 mg/dL            

        

 

                    Serum or plasma calcium measurement (mass/volume)           

9.8 mg/dL           

8.5-10.1        

 

                          Serum or plasma total bilirubin measurement (mass/volu

me)           0.6 mg/dL   

                                        0.1-1.0        

 

                                        Serum or plasma alkaline phosphatase elizabeth

surement (enzymatic activity/volume)    

                          50 U/L                            

 

                                        Serum or plasma aspartate aminotransfera

se measurement (enzymatic 

activity/volume)           17 U/L                    5-34        

 

                                        Serum or plasma alanine aminotransferase

 measurement (enzymatic activity/volume)

                          19 U/L                    0-55        

 

                    Serum or plasma protein measurement (mass/volume)           

7.2 g/dL            

6.4-8.2        

 

                    Serum or plasma albumin measurement (mass/volume)           

4.6 g/dL            

3.2-4.5        

 

                                        Complete blood count (CBC) with automate

d white blood cell (WBC) differential - 

19 18:37         

 

                          Blood leukocytes automated count (number/volume)      

     7.5 10*3/uL          

                                        4.3-11.0        

 

                          Blood erythrocytes automated count (number/volume)    

       4.76 10*6/uL       

                                        4.35-5.85        

 

                    Venous blood hemoglobin measurement (mass/volume)           

14.6 g/dL           

11.5-16.0        

 

                    Blood hematocrit (volume fraction)           42 %           

     35-52        

 

                    Automated erythrocyte mean corpuscular volume           87 [

foz_us]           

80-99        

 

                                        Automated erythrocyte mean corpuscular h

emoglobin (mass per erythrocyte)        

                          31 pg                     25-34        

 

                                        Automated erythrocyte mean corpuscular h

emoglobin concentration measurement 

(mass/volume)             35 g/dL                   32-36        

 

                    Automated erythrocyte distribution width ratio           12.

3 %              10.0-

14.5        

 

                    Automated blood platelet count (count/volume)           286 

10*3/uL           

130-400        

 

                          Automated blood platelet mean volume measurement      

     10.8 [foz_us]        

                                        7.4-10.4        

 

                    Automated blood neutrophils/100 leukocytes           55 %   

             42-75       

 

 

                    Automated blood lymphocytes/100 leukocytes           33 %   

             12-44       

 

 

                    Blood monocytes/100 leukocytes           9 %                

 0-12        

 

                    Automated blood eosinophils/100 leukocytes           3 %    

             0-10        

 

                    Automated blood basophils/100 leukocytes           1 %      

           0-10        

 

                    Blood neutrophils automated count (number/volume)           

4.1 10*3            

1.8-7.8        

 

                    Blood lymphocytes automated count (number/volume)           

2.5 10*3            

1.0-4.0        

 

                    Blood monocytes automated count (number/volume)           0.

6 10*3            

0.0-1.0        

 

                    Automated eosinophil count           0.2 10*3/uL           0

.0-0.3        

 

                    Automated blood basophil count (count/volume)           0.0 

10*3/uL           

0.0-0.1        

 

                                        Serum or plasma choriogonadotropin measu

rement (units/volume) - 19 18:37  

       

 

                          Serum or plasma choriogonadotropin measurement (units/

volume)           91038 

m[iU]/mL                                <5        

 

                                        Complete urinalysis with reflex to cultu

re - 19 19:15         

 

                    Urine color determination           YELLOW              NRG 

       

 

                    Urine clarity determination           CLEAR               NR

G        

 

                    Urine pH measurement by test strip           6.0            

     5-9        

 

                    Specific gravity of urine by test strip           >=        

          1.016-1.022     

   

 

                          Urine protein assay by test strip, semi-quantitative  

         NEGATIVE         

                                        NEGATIVE        

 

                    Urine glucose detection by automated test strip           NE

GATIVE            

NEGATIVE        

 

                          Erythrocytes detection in urine sediment by light micr

oscopy           3+       

                                        NEGATIVE        

 

                    Urine ketones detection by automated test strip           1+

                  NEGATIVE

        

 

                    Urine nitrite detection by test strip           NEGATIVE    

        NEGATIVE    

    

 

                    Urine total bilirubin detection by test strip           NEGA

TIVE            

NEGATIVE        

 

                          Urine urobilinogen measurement by automated test strip

 (mass/volume)           

0.2 mg/dL                               < = 1.0        

 

                    Urine leukocyte esterase detection by dipstick           NEG

ATIVE            

NEGATIVE        

 

                                        Automated urine sediment erythrocyte cou

nt by microscopy (number/high power 

field)                     [HPF]                    NRG        

 

                                        Automated urine sediment leukocyte count

 by microscopy (number/high power field)

                           [HPF]                    NRG        

 

                          Bacteria detection in urine sediment by light microsco

py           FEW          

                                        NRG        

 

                                        Squamous epithelial cells detection in u

rine sediment by light microscopy       

                          2-5                       NRG        

 

                          Crystals detection in urine sediment by light microsco

py           PRESENT      

                                        NRG        

 

                    Casts detection in urine sediment by light microscopy       

    NONE                

NRG        

 

                          Mucus detection in urine sediment by light microscopy 

          SMALL           

                                        NRG        

 

                    Complete urinalysis with reflex to culture           YES    

             NRG        

 

                                        Calcium oxalate crystals detection in ur

ine sediment by light microscopy        

                          FEW                       NRG        

 

                                        Bacterial urine culture - 19 19:15

         

 

                    Bacterial urine culture           NG                  NRG   

     

 

                                        Complete blood count (CBC) with automate

d white blood cell (WBC) differential - 

20 13:31         

 

                          Blood leukocytes automated count (number/volume)      

     9.4 10*3/uL          

                                        4.3-11.0        

 

                          Blood erythrocytes automated count (number/volume)    

       4.65 10*6/uL       

                                        4.35-5.85        

 

                    Venous blood hemoglobin measurement (mass/volume)           

14.1 g/dL           

11.5-16.0        

 

                    Blood hematocrit (volume fraction)           40 %           

     35-52        

 

                    Automated erythrocyte mean corpuscular volume           87 [

foz_us]           

80-99        

 

                                        Automated erythrocyte mean corpuscular h

emoglobin (mass per erythrocyte)        

                          30 pg                     25-34        

 

                                        Automated erythrocyte mean corpuscular h

emoglobin concentration measurement 

(mass/volume)             35 g/dL                   32-36        

 

                    Automated erythrocyte distribution width ratio           12.

5 %              10.0-

14.5        

 

                    Automated blood platelet count (count/volume)           221 

10*3/uL           

130-400        

 

                          Automated blood platelet mean volume measurement      

     10.9 [foz_us]        

                                        7.4-10.4        

 

                    Automated blood neutrophils/100 leukocytes           89 %   

             42-75       

 

 

                    Automated blood lymphocytes/100 leukocytes           6 %    

             12-44        

 

                    Blood monocytes/100 leukocytes           4 %                

 0-12        

 

                    Automated blood eosinophils/100 leukocytes           2 %    

             0-10        

 

                    Automated blood basophils/100 leukocytes           0 %      

           0-10        

 

                    Blood neutrophils automated count (number/volume)           

8.3 10*3            

1.8-7.8        

 

                    Blood lymphocytes automated count (number/volume)           

0.5 10*3            

1.0-4.0        

 

                    Blood monocytes automated count (number/volume)           0.

4 10*3            

0.0-1.0        

 

                    Automated eosinophil count           0.2 10*3/uL           0

.0-0.3        

 

                    Automated blood basophil count (count/volume)           0.0 

10*3/uL           

0.0-0.1        

 

                                        Manual absolute plasma cell count -  13:31         

 

                    Blood monocytes/100 leukocytes           3 %                

 NRG        

 

                    Manual blood segmented neutrophils/100 leukocytes           

82 %                NRG  

      

 

                    Blood band neutrophils/100 leukocytes           3 %         

        NRG        

 

                    Manual blood lymphocytes/100 leukocytes           9 %       

          NRG        

 

                    Manual eosinophils/100 leukocytes in nose           3 %     

            NRG        

 

                    Blood erythrocyte morphology finding identification         

  NORMAL              

NRG        

 

                    Blood dohle body detection by light microscopy           SLI

GHT              NRG  

      

 

                                        Comprehensive metabolic panel - 20

 13:31         

 

                          Serum or plasma sodium measurement (moles/volume)     

      138 mmol/L          

                                        135-145        

 

                          Serum or plasma potassium measurement (moles/volume)  

         3.9 mmol/L       

                                        3.6-5.0        

 

                          Serum or plasma chloride measurement (moles/volume)   

        103 mmol/L        

                                                

 

                    Carbon dioxide           25 mmol/L           21-32        

 

                          Serum or plasma anion gap determination (moles/volume)

           10 mmol/L      

                                        5-14        

 

                          Serum or plasma urea nitrogen measurement (mass/volume

)           7 mg/dL       

                                        7-18        

 

                          Serum or plasma creatinine measurement (mass/volume)  

         0.58 mg/dL       

                                        0.60-1.30        

 

                    Serum or plasma urea nitrogen/creatinine mass ratio         

  12                  NRG 

       

 

                                        Serum or plasma creatinine measurement w

ith calculation of estimated glomerular 

filtration rate           >                         NRG        

 

                    Serum or plasma glucose measurement (mass/volume)           

88 mg/dL            

        

 

                    Serum or plasma calcium measurement (mass/volume)           

9.6 mg/dL           

8.5-10.1        

 

                          Serum or plasma total bilirubin measurement (mass/volu

me)           0.5 mg/dL   

                                        0.1-1.0        

 

                                        Serum or plasma alkaline phosphatase elizabeth

surement (enzymatic activity/volume)    

                          60 U/L                            

 

                                        Serum or plasma aspartate aminotransfera

se measurement (enzymatic 

activity/volume)           17 U/L                    5-34        

 

                                        Serum or plasma alanine aminotransferase

 measurement (enzymatic activity/volume)

                          12 U/L                    0-55        

 

                    Serum or plasma protein measurement (mass/volume)           

7.2 g/dL            

6.4-8.2        

 

                    Serum or plasma albumin measurement (mass/volume)           

4.3 g/dL            

3.2-4.5        

 

                    CALCIUM CORRECTED           9.4 mg/dL           8.5-10.1    

    

 

                                        Serum or plasma choriogonadotropin measu

rement (units/volume) - 20 13:31  

       

 

                          Serum or plasma choriogonadotropin measurement (units/

volume)           16827 

m[iU]/mL                                <5        

 

                                        Complete urinalysis with reflex to cultu

re - 20 15:41         

 

                    Urine color determination           DARK YELLOW            N

RG        

 

                    Urine clarity determination           SL CLOUDY            N

RG        

 

                    Urine pH measurement by test strip           6.5            

     5-9        

 

                    Specific gravity of urine by test strip           1.025     

          1.016-1.022  

      

 

                    Urine protein assay by test strip, semi-quantitative        

   TRACE               

NEGATIVE        

 

                    Urine glucose detection by automated test strip           NE

GATIVE            

NEGATIVE        

 

                          Erythrocytes detection in urine sediment by light micr

oscopy           NEGATIVE 

                                        NEGATIVE        

 

                    Urine ketones detection by automated test strip           3+

                  NEGATIVE

        

 

                    Urine nitrite detection by test strip           NEGATIVE    

        NEGATIVE    

    

 

                    Urine total bilirubin detection by test strip           NEGA

TIVE            

NEGATIVE        

 

                          Urine urobilinogen measurement by automated test strip

 (mass/volume)           

1.0 mg/dL                               < = 1.0        

 

                    Urine leukocyte esterase detection by dipstick           1+ 

                 NEGATIVE 

       

 

                                        Automated urine sediment erythrocyte cou

nt by microscopy (number/high power 

field)                    NONE                      NRG        

 

                                        Automated urine sediment leukocyte count

 by microscopy (number/high power field)

                           [HPF]                    NRG        

 

                          Bacteria detection in urine sediment by light microsco

py           MODERATE     

                                        NRG        

 

                                        Squamous epithelial cells detection in u

rine sediment by light microscopy       

                          2-5                       NRG        

 

                          Crystals detection in urine sediment by light microsco

py           NONE         

                                        NRG        

 

                    Casts detection in urine sediment by light microscopy       

    NONE                

NRG        

 

                          Mucus detection in urine sediment by light microscopy 

          SMALL           

                                        NRG        

 

                    Complete urinalysis with reflex to culture           YES    

             NRG        

 

                                        Bacterial urine culture - 20 15:41

         

 

                    Bacterial urine culture           UMSF                NRG   

     

 

                    COLONY COUNT           <10,000             NRG        

 

                    FTX;REPORTABLE           NORMAL CHLOE            NRG        

 

                    FREE TEXT ENTRY 2           NO SUSCEPTIBILITIES SET UP      

      NRG        

 

                                        Microscopic examination by wet preparati

on - 20 15:10         

 

                    WET PREP RESULTS           NO WBC'S OBSERVED            NRG 

       

 

                                        Complete urinalysis with reflex to cultu

re - 20 15:10         

 

                    Urine color determination           YELLOW              NRG 

       

 

                    Urine clarity determination           CLEAR               NR

G        

 

                    Urine pH measurement by test strip           7.0            

     5-9        

 

                    Specific gravity of urine by test strip           1.010     

          1.016-1.022  

      

 

                          Urine protein assay by test strip, semi-quantitative  

         NEGATIVE         

                                        NEGATIVE        

 

                    Urine glucose detection by automated test strip           NE

GATIVE            

NEGATIVE        

 

                          Erythrocytes detection in urine sediment by light micr

oscopy           2+       

                                        NEGATIVE        

 

                    Urine ketones detection by automated test strip           NE

GATIVE            

NEGATIVE        

 

                    Urine nitrite detection by test strip           NEGATIVE    

        NEGATIVE    

    

 

                    Urine total bilirubin detection by test strip           NEGA

TIVE            

NEGATIVE        

 

                          Urine urobilinogen measurement by automated test strip

 (mass/volume)           

0.2 mg/dL                               < = 1.0        

 

                    Urine leukocyte esterase detection by dipstick           NEG

ATIVE            

NEGATIVE        

 

                                        Automated urine sediment erythrocyte cou

nt by microscopy (number/high power 

field)                     [HPF]                    NRG        

 

                                        Automated urine sediment leukocyte count

 by microscopy (number/high power field)

                           [HPF]                    NRG        

 

                          Bacteria detection in urine sediment by light microsco

py           FEW          

                                        NRG        

 

                                        Squamous epithelial cells detection in u

rine sediment by light microscopy       

                          RARE                      NRG        

 

                          Crystals detection in urine sediment by light microsco

py           NONE         

                                        NRG        

 

                    Casts detection in urine sediment by light microscopy       

    NONE                

NRG        

 

                          Mucus detection in urine sediment by light microscopy 

          NEGATIVE        

                                        NRG        

 

                    Complete urinalysis with reflex to culture           YES    

             NRG        

 

                                        Bacterial urine culture - 20 15:10

         

 

                    Bacterial urine culture           NG                  NRG   

     

 

                                        Complete blood count (CBC) with automate

d white blood cell (WBC) differential - 

20 09:15         

 

                          Blood leukocytes automated count (number/volume)      

     9.2 10*3/uL          

                                        4.3-11.0        

 

                          Blood erythrocytes automated count (number/volume)    

       4.29 10*6/uL       

                                        4.35-5.85        

 

                    Venous blood hemoglobin measurement (mass/volume)           

13.0 g/dL           

11.5-16.0        

 

                    Blood hematocrit (volume fraction)           38 %           

     35-52        

 

                    Automated erythrocyte mean corpuscular volume           88 [

foz_us]           

80-99        

 

                                        Automated erythrocyte mean corpuscular h

emoglobin (mass per erythrocyte)        

                          30 pg                     25-34        

 

                                        Automated erythrocyte mean corpuscular h

emoglobin concentration measurement 

(mass/volume)             35 g/dL                   32-36        

 

                    Automated erythrocyte distribution width ratio           13.

4 %              10.0-

14.5        

 

                    Automated blood platelet count (count/volume)           187 

10*3/uL           

130-400        

 

                          Automated blood platelet mean volume measurement      

     10.9 [foz_us]        

                                        7.4-10.4        

 

                    Automated blood neutrophils/100 leukocytes           75 %   

             42-75       

 

 

                    Automated blood lymphocytes/100 leukocytes           15 %   

             12-44       

 

 

                    Blood monocytes/100 leukocytes           8 %                

 0-12        

 

                    Automated blood eosinophils/100 leukocytes           2 %    

             0-10        

 

                    Automated blood basophils/100 leukocytes           0 %      

           0-10        

 

                    Blood neutrophils automated count (number/volume)           

6.9 10*3            

1.8-7.8        

 

                    Blood lymphocytes automated count (number/volume)           

1.3 10*3            

1.0-4.0        

 

                    Blood monocytes automated count (number/volume)           0.

7 10*3            

0.0-1.0        

 

                    Automated eosinophil count           0.2 10*3/uL           0

.0-0.3        

 

                    Automated blood basophil count (count/volume)           0.0 

10*3/uL           

0.0-0.1        



                                                                                
              



Encounters

      



                ACCT No.           Visit Date/Time           Discharge          

 Status         

             Pt. Type           Provider           Facility           Loc./Unit 

          

Complaint        

 

                [id number]           2018 13:40:00           2018 23:59:

59           CLS

                Outpatient           [facility] 

[place]                                         

 

             [id number]           2018 13:40:00                              

       Document

 Registration                                                                   

 

 

             [id number]           2018 08:00:00                              

       Document

 Registration                                                                   

 

 

                    [id number]           2020 09:01:00            23:59:59        

                CLS             Outpatient           NORMAN NEAL MD  

         [facility] 

[facility]           RAD                       BILAT CALF PAIN

        

 

                    [id number]           2020 14:29:00            16:15:00        

                DIS             Outpatient           NORMAN NEAL MD  

         [facility] 

[facility]           WSo                       CONTRACTIONS   

     

 

                    [id number]           2020 11:39:00            16:20:00        

                DIS             Emergency           ANGELA SHEARER           [facility]ia

 [facility]           ER                        VOMITING / CRAMPING 15W

KS PREG      

  

 

                    [id number]           2019 07:35:00            23:59:59        

                CLS             Outpatient           MARIA GUADALUPE KARIMI MD   

        [facility] 

[facility]           RAD                       VAGINAL BLEEDIN

G IN 

PREGNANCY        

 

                    [id number]           2019 18:23:00            20:27:00        

                DIS             Emergency           MARIA GUADALUPE KARIMI MD    

       [facility] 

[facility]           ER                        POSS 8 WKS PREG

/VAG BLEEDING

        

 

                    [id number]           10/06/2019 14:54:00           10/06/2

019 17:02:00        

                DIS             Emergency           ANGELA SHEARER           [facility]ia

 [facility]           ER                        LOW ABD PAIN        

 

                    [id number]           2018 10:30:00            23:59:59        

                CLS             Preadmit           RADHA DELA CRUZ

ia [facility]           RAD                       DUB, RLQ ABDOMINAL PAIN

        

 

                    [id number]           2018 14:18:00            17:58:00        

                DIS             Emergency           MATT BYNUM MD          

 [facility]           ER                        R OVARY PAIN;IRREGULAR 

BLEEDING     

   

 

                    [id number]           2018 17:53:00           

018 19:23:00        

                DIS             Emergency           DOROTHY ESTRELLA           Via

 [facility]           ER                        HEALING GASH RIGHT LEG,

 SWELLING    

    

 

                    [id number]           2018 12:41:00           

018 13:11:00        

                DIS             Emergency           RACH LARA         

  [facility]           ER                        FALL/R LEG LAC        

 

                    [id number]           2018 16:46:00           

018 18:45:00        

                DIS             Emergency           ANGELA SHEARER           Via

 [facility]           ER                        5 WEEKS PREGNANT AND ST

ARTED 

BLEEDING        

 

                    [id number]           2018 06:18:00           

018 23:59:59        

                CLS             Outpatient           TC ALEMAN DO S       

    [facility]           LAB                       LOW HCG LEVELS        

 

                    [id number]           2018 10:23:00           

018 12:15:00        

                DIS             Emergency           RACH LARA         

  [facility]           ER                        POSSIBLY PG,ABD CRAMPIN

G        

 

                    [id number]           2018 14:34:00           

018 23:59:59        

                CLS             Preadmit           THEA COOK DO      

     [facility] 

[facility]           REHAB                     POSTPARTUM BACK

 PAIN     

   

 

                    [id number]           2017 09:15:00           

017 13:35:00        

                DIS             Inpatient           NORMAN NEAL MD   

        [facility] 

[facility]           LDRP                      BREECH PRESENTA

TION       

 

 

                    [id number]           2015 18:14:00           

015 19:12:00        

                DIS             Emergency           RACH LARA         

  [facility]           ER                        LEFT HAND FINGER LAC;AB

D PAIN       

 

 

                    [id number]           2014 11:04:00           

014 13:14:00        

                DIS             Emergency           LALA MCCOY       

    [facility]           ER                        ABD PAIN        

 

             [id number]           2020 09:25:00                         

            

Document Registration                                                           

         

 

             [id number]           2018 23:06:00                         

            

Document Registration                                                           

         

 

             [id number]           2018 15:18:00                         

            

Document Registration                                                           

         

 

             [id number]           2011 13:31:00                         

            

Document Registration                                                           

         

 

             [id number]           2011 13:25:00                         

            

Document Registration                                                           

         

 

             [id number]           2011 11:22:00                         

            

Document Registration                                                           

         

 

             [id number]           2011 00:50:00                         

            

Document Registration                                                           

         

 

             [id number]           2010 22:18:00                         

            

Document Registration

## 2020-07-16 NOTE — XMS REPORT
Patient Summarization Differential

                             Created on: 2020



ALEJANDRINA SCHAEFFER

External Reference #: [id number]

: 1995

Sex: Female



Demographics





                          Address                   [address]



 

                          Home Phone                [phone]

 

                          Preferred Language        English

 

                          Marital Status            Single

 

                          Restorationist Affiliation     [id number]

 

                          Race                      White

 

                          Ethnic Group              Not  or 





Author





                          Author                    [employer] [place]  [employer]

 

                          [facility]              [facility]

 

                          Address                   [address]  [address]



 

                          Phone                     [phone]







Care Team Providers





                    Care Team Member Name Role                Phone

 

                    NO, LOCAL PHYSICIAN Unavailable         Unavailable

 

                    ORENDER, PETE S Unavailable         [phone]

 

                    NO, LOCAL PHYSICIAN Unavailable         Unavailable

 

                    PIPE DONATO      Unavailable         [phone]

 

                    BARNIDGE, THEA  Unavailable         [phone]

 

                    BARNIDGE, THEA  Unavailable         [phone]

 

                    BARNIDGE, THEA E Unavailable         [phone]

 

                    DOROTHY ESTRELLA      Unavailable         Unavailable

 

                    MARIA GUADALUPE KARIMI MD Unavailable         Unavailable

 

                    BARNIDGE, THEA  Unavailable         [phone]

 

                    BARNIDGE, THEA  Unavailable         [phone]

 

                    RACH LARA Unavailable         Unavailable

 

                    NORMAN NEAL MD Unavailable         Unavailable

 

                    NORMAN NEAL MD Unavailable         Unavailable

 

                    LALA MCCOY Unavailable         Unavailable

 

                    BARNIDGE, THEA  Unavailable         [phone]

 

                    BARNIDGE, THEA  Unavailable         [phone]

 

                    BARNIDGE, THEA  Unavailable         [phone]

 

                    BARNIDGE, THEA  Unavailable         [phone]

 

                    BARNIDGE, THEA  Unavailable         [phone]

 

                    MILENA BLACK        Unavailable         [phone]

 

                    NO, LOCAL PHYSICIAN Unavailable         Unavailable

 

                    JADYN EVERETT MD Unavailable         Unavailable

 

                    ORENDER DO, PETE S Unavailable         Unavailable

 

                    [employer] PP                  Unavailable

 

                    [employer] CCM                 Unavailable

 

                    NORMAN NEAL MD Unavailable         Unavailable

 

                    NORMAN NEAL MD Unavailable         Unavailable

 

                    NO, LOCAL PHYSICIAN PCP                 Unavailable

 

                    MANFRED, ANGELA ARNP      Unavailable         Unavailable

 

                    MATT BYNUM     Unavailable         Unavailable

 

                    MANFRED ARAYA, ANGELA L    Unavailable         Unavailable

 

                    RACH LARA Unavailable         Unavailable

 

                    LOVE DOTC S Unavailable         Unavailable

 

                    LALA MCCOY Unavailable         Unavailable

 

                    JAMILAH MOSER DO     Unavailable         Unavailable

 

                          Unavailable               Unavailable

 

                          Unavailable               Unavailable

 

                          Unavailable               Unavailable

 

                          Unavailable               Unavailable

 

                          Unavailable               Unavailable



                                            



Allergies

                      



      



           Allergy   Reported Allergen(s)  Allergy Type  Date of   Reaction(s)  

Care      

Facility



                     Classificati        Onset               Provider 



                                         on      

 

      



            Unclassified  kiwi       SARAH         2014   RACH LARA  Not



                           (22 sources)              Available



                                         ([id number])



                                                                              



Encounters

                      



    



              Encounter Date  Encounter Type  Encounter Diagnosis  Care Provider

  Facility

 

    



                 Start:          Patient encounter   NORMAN GOULDTHAM  [facility] 

[facility]



                 2020      procedure       [facility]

 

    



                 Start:          Patient encounter   NORMAN NEAL  [facility]



                 2020      procedure       [facility]



                                         

    



                                         End:    



                                         2020    

 

    



                 Start:          Emergency department   JAMILAH FOSSFLEX SCHROEDER  [facility]



                     2020          patient visit       [facility]



                                         

    



                                         End:    



                                         2020    

 

    



                 Start:          Patient encounter   ANGELA ARAYA  [facility]



                     2020          [facility]

 

    



                 Start:          Patient encounter   MARIA GUADALUPE TIRADOZACKLAURI  [facility]

[facility]



                 2019      procedure       [facility]

 

    



                 Start:          Emergency department   LOCAL NO        [facility]

n [facility]



                     2019          patient visit       [facility]



                                         

    



                                         End:    



                                         2019    

 

    



                 Start:          Emergency department   MARIA GUADALUPE MARICHUY KARIMI  [facility]

 [facility]



                 2019      patient visit   [facility]



                                         

    



                                         End:    



                                         2019    

 

    



                 Start:          Emergency department   LOCAL NO        [facility]

n [facility]



                     10-          patient visit       Hospital

 

    



                 Start:          Patient encounter   ANGELA SHEARER        [facility]

is



                     10-          procedure           [facility]



                                         ([id number])

 

    



                 Start:          Emergency department   ANGELA ARAYA  [facility]

 [facility]



                     10-          patient visit       [facility]



                                         

    



                                         End:    



                                         10-    

 

    



                 Start:          Emergency department   MATT BYNUM  Not [facility] ([id number])



                     2018          patient visit       Work Phone: 



                                         

                                         [phone] 



                                         End:    



                                         2018    

 

    



                 Start:          Emergency department   MATT BYNUM MD  [facility]

[facility]



                     2018          patient visit       [facility]



                                         

    



                                         End:    



                                         2018    

 

    



                 Start:          Emergency department   DOROTHY ESTRELLA   Not Avai

lable ([id number])



                           2018                patient visit   



                                         

    



                                         End:    



                                         2018    

 

    



                 Start:          Patient encounter   DOROTHY ESTRELLA   Not Availab

le ([id number])



                           2018                procedure   

 

    



                 Start:          Emergency department   DOROTHY ESTRELLA ARNP  [facility]



                     2018          patient visit       [facility]



                                         

    



                                         End:    



                                         2018    

 

    



                 Start:          Emergency department   RACH LARA  Not 

Available ([id number])



                     2018          patient visit       Work Phone: 



                                         

                                         [phone] 



                                         End:    



                                         2018    

 

    



                 Start:          Patient encounter   RACH LARA     Not Availab

le ([id number])



                           2018                procedure   

 

    



                 Start:          Emergency department   RACH LARA APRN  [facility]



                     2018          patient visit       [facility]



                                         

    



                                         End:    



                                         2018    

 

    



              Start:       [facility]  Encounter for  [facility] BLACK  [facility]

[place]



                           10-                contraceptive  



                                         management,  



                                         unspecified  

 

    



                 Start:          [facility] [place]   THEA BARNIDGE  [facility]

[place]



                                         2018    

 

    



                 Start:          [facility] [place]   THEA BARNIDGE  [facility]

[place]



                                         2018    

 

    



                 Start:          Telephone encounter   [facility]OOK  [facility]

 [place]



                                         2018    

 

    



                 Start:          Emergency department   MARIA GUADALUPE KARIMI  No

t Available ([id number])



                     2018          patient visit       Work Phone: 



                                         

                                         [phone] 



                                         End:    



                                         2018    

 

    



                 NEGATED:        Emergency department   NA NA           [facility]



                     Highlighted         patient visit       [facility]



                           row has not               ([id number])



                                         occurred!    



                                         Start:    



                                         2018    



                                         End:    



                                         2018    

 

    



                 Start:          Patient encounter   NA NA           [facility]

isti



                     2018          procedure           [facility]



                                         ([id number])

 

    



                 Start:          Emergency department   MARIA GUADALUPE KARIMI  [facility]

[facility]



                 2018      [facility]



                                         

    



                                         End:    



                                         2018    

 

    



              Start:       [facility]  Missed   [facility] COOK  

[facility] [place]



                                         2018    

 

    



                 Start:          Emergency department   DOROTHY ESTRELLA   [facility]



                     2018          patient visit       [facility]



                                         

                                         ([id number])



                                         End:    



                                         2018    

 

    



                 Start:          Patient encounter   NA NA           [facility]

isti



                     2018          procedure           [facility]



                                         ([id number])

 

    



                 Start:          Telephone encounter   THEA COOK  [facility]

 [place]



                                         2018    

 

    



                 Start:          Emergency department   DOROTHY ARAYA  [facility] 

[facility]



                     2018          patient visit       [facility]



                                         

    



                                         End:    



                                         2018    

 

    



                 Start:          [facility]   NA NA           [facility]



                     [facility]          [facility]           [facility] ([id number])

 

    



              Start:       Consultation for  Encounter for  THEA GOMEZ [place]



                     2018          laboratory medicine  supervision of oth

er  



                                         normal pregnancy,  



                                         first trimester  

 

    



                 Start:          Telephone encounter   THEA COOK  [facility]

 [place]



                                         2018    

 

    



              Start:       [facility] [place]  Encounter for  THEA COOK [facility]

[facility] [place]



                           2018                supervision of other  



                                         normal pregnancy,  



                                         first trimester  

 

    



                 Start:          Telephone encounter   [facility]

 [place]



                                         2018    

 

    



                           Start:                    Patient encounter   



                           2018                procedure   

 

    



                 Start:          Emergency department   [facility]  [facility]

 [facility]



                     2018          patient visit       [facility]



                                         

    



                                         End:    



                                         2018    

 

    



                 Start:          Patient encounter   TC ANGELO ALEMAN DO  [facility]

[facility]



                     2018          procedure           [facility]

 

    



                           Start:                    Patient encounter   



                           2018                procedure   

 

    



                 Start:          Emergency department   RACH MALDONADO  [facility] 

[facility]



                     2018          patient visit       [facility]



                                         

    



                                         End:    



                                         2018    

 

    



                           Start:                    Patient encounter   



                           2018                procedure   

 

    



                 Start:          Patient encounter   NA NA           [facility]

[facility]



                     2017          procedure           [facility] ([id number])

 

    



                 Start:          Patient encounter   NA NA           [facility]

ealt



                     02-          procedure           [facility] ([id number])

 

    



                 Start:          Patient encounter   NORMAN NEAL  Not Av

ailable ([id number])



                     2017          procedure           [facility] 



                                         

    



                                         End:    



                                         2017    

 

    



                 Start:          Evaluation and   NORMAN NEAL  [facility]

 [facility]



                 2017      management of   MD              [facility]   



                                         End:    



                                         2017    

 

    



                 Start:          Emergency department   JADYN EVERETT MD  Not Av

ailable ([id number])



                           2011                patient visit   



                                         

    



                                         End:    



                                         2011    

 

    



                 Start:          Patient encounter   PETE OCHOA DO  Not 

Available ([id number])



                           2011                procedure   

 

    



                     Patient encounter   NA NA               Not Available (0000

0)



                                         procedure   



                                                                                
                                                                                
                                                                                
                                                                                
                                                                                
                                                                                
                                                                        



Medical Equipment

                      The data below is from unstructured sourcesNo Medical 
Equipment Information availableNo Medical Equipment Information availableNo 
Medical Equipment Information available                                         
                          



Goals

                      



  



                     Date                Patient Goal        Desired Activity/St

ate

 

  



                                                                              



Immunizations

                      



    



              Immunizatio  Immunization  Notes        Care Provider  Facility



                                         n Date    

 

    



                     2019          LOCAL NO            [facility] ([id number])

 

    



                     2018          LOCAL NO            [facility] ([id number])

 

    



                 2017      diphtheria, tetanus   NA NA           Not Avail

able ([id number])



                                         toxoids and acellular   



                                         pertussis vaccine   



                                                                                
                 



Interventions

          No Information                                                        
 



Medications

                      Current Medications            

            



    



              Medication   Drug         Dates        Sig          Sig (Original)



                           Class(es)                 (Normalized) 

 

    



              ALPRAZolam 0.5 mg oral  Benzodiaze   Start:       take 1 tablet  X

anax 0.5 MG Orally 

TID PRN 1 tablet 16



              tablet       pine         2018   by mouth three  Aug, 2018 1

0 days Active



                           (2 sources)               times daily as 



                                         needed 

 

    



              cephalexin 500 mg oral  Cephalospo   Start:       take 1 capsule  

Keflex 500 mg 

Orally 3 times a day 1



              capsule      rin          2018   by mouth three  capsule 8h 

 

10



                     (1 source)          Antibacter          

                           times daily               day(s) Active



                           ial                       End:  



                                         2018  

 

    



              {24 (drospirenone 3 MG /  Progestin,   Start:       take 3 tablets

  MERCEDES 3-0.02 MG 

Orally Once a day 1 tablet



              Ethinyl Estradiol 0.02  Estrogen     10-   by mouth once  2

4h 24 Oct, 2018 

28 day(s) Active



                           MG Oral Tablet) / 4       daily 



                                         (Inert Ingredients 1 MG    



                                         Oral Tablet) } Pack [[employer]    



                                         28 Day]    



                                         (4 sources)    

 

  



                           End: 10-           Ethinyl



                                         Estradiol/Dros



                                         pirenone



                                         Discontinued



                                         NOT APPLICABLE



                                         2019

 

  



                           End: 10-           Ethinyl



                                         Estradiol/Dros



                                         pirenone



                                         Discontinued



                                         NOT APPLICABLE



                                         2019

 

  



                                         Ethinyl



                                         Estradiol/Dros



                                         pirenone



                                         (Drospirenone-



                                         Ee 3-0.02 Mg



                                         Tab) 1 Each



                                         Tablet NOT



                                         APPLICABLE

 

    



              megestrol acetate 20 mg  Progestin    Start:       take 1 tablet  

Megestrol Acetate 20

mg Orally Twice a



                 oral tablet     10-      by mouth twice  day 1 tablet 12

h 24 Oct, 2018 23 Nov,



                           (1 source)                

                           daily                      30 day(s) Active



                                         End:  



                                         2018  

 

    



                 miSOPROStol 0.2 mg oral  Prostaglan      Start:          Cytote

c 200 MCG Orally one time, 

repeat



                 tablet          din E1          2018      in 24 hours if 

needed 4 tablet 16 Aug,



                     (1 source)          Analog              

                                         2018 18 Aug, 2018 2 days Active



                                         End:  



                                         2018  

 

    



                 Prenat-Fe Bisgly-FA-w/o   Start:          take 1 tablet   Prena

t-Fe Bisgly-FA-w/o Vit A 

32-1 MG



                 Vit A 32-1 MG   2018      by mouth once   Orally Once a d

ay 1 tablet 24h ,



                     (2 sources)         daily                30 day(s) Acti

ve



            

            Completed/Discontinued Medications            

            



    



              Medication   Drug         Dates        Sig          Sig (Original)



                           Class(es)                 (Normalized) 

 

    



                 Acetaminophen /  Opioid          Start:          Acetaminophen/

Hydrocodone Bitart



                 HYDROcodone     Agonist         2018      Discontinued 1 

ORAL Q4-6HR as needed for



                           (6 sources)               

                                         Pain-Moderate 



                           End:                      7:07pm October 6th, 2019



                                         10-  

 

  



                           Start: 2018         Acetaminophen/



                           End: 10-           Hydrocodone



                                         Bitart



                                         Discontinued 1



                                         ORAL Q4-6HR as



                                         needed for



                                         Pain-Moderate



                                         



                                         7:07pm 2019

 

  



                     Start: 2018   take 1              Acetaminophen/



                           tablet by                 Hydrocodone



                           mouth                     Bitart



                           every four                (Hydrocodone/A



                           to six                    cetaminophen



                           hours as                  5/325MG



                           needed for                Tablet) 1 Tab



                           pain                      Tab 1 Each



                                         ORAL Q4-6HR as



                                         needed for



                                         Pain-Moderate



                                         14 Tab



                                         18

 

  



                     Start: 2018   take 1-2            Norco 5-325 MG



                     End: 2018     tablets by          Orally every 6



                           mouth                     hrs 1-2 tablet



                           every six                 as needed 6h



                           hours as                  16 Aug, 2018



                           needed                    23 Aug, 2018



                                         07 days Active

 

    



                     Antibiotic For Face   End:                Antibiotic For Fa

ce Discontinued NOT



                     (2 sources)         2014          APPLICABLE Twice A 

Day 2014

 

  



                           End: 2014           Antibiotic For



                                         Face



                                         Discontinued



                                         NOT APPLICABLE



                                         Twice A Day



                                         2014

 

    



                 ciprofloxacin 500 mg  Quinolone       Start:          Ciproflox

acin Hcl Discontinued 500 

ORAL



                 oral tablet     Antimicrob      10-      Twice A Day 6 O

ctober 2019 4:53pm



                     (2 sources)         ial                 

                                         (One-Time)



                                         End:  



                                         10-  

 

    



                                         Mylan

                           End:                      Mylan Discontinued NOT APPL

ICABLE



                     (5 sources)         10-          October 6th, 2019

 

  



                           End: 10-           Mylan



                                         Discontinued



                                         NOT APPLICABLE



                                         2019

 

  



                                         Mylan NOT



                                         APPLICABLE

 

    



                     Pnv95/Ferrous       End:                Pnv95/Ferrous Fumar

ate/Fa Discontinued 1



                     Fumarate/Fa         2018          ORAL Daily 2018



                                         (2 sources)    

 

  



                           End: 2018           Pnv95/Ferrous



                                         Fumarate/Fa



                                         Discontinued 1



                                         ORAL Daily



                                         2018

 

    



                     Pnv95/Ferrous       End:                Pnv95/Ferrous Fumar

ate/Fa (Prenatal



                     Fumarate/Fa (Prenatal   2018          Caplet) 1 Each 

Tablet, 1 Each Oral Daily



                           Caplet) 1 Each Tablet, 1     Discontinued



                                         Each Oral    



                                         (3 sources)    



                                                                                
                                                                                
                          



Payers

                      



   



                 Date            Payer           Normalized Payer  Policy ID

 

   



                                         [id number]



                                         [id number]

 

   



                                         [id number]



                                         [id number]



                                                                                
       



Plan of Treatment

                      



   



                 Date            Care Activity   Detail          Author

 

   



                 Start:          [facility]



                                         2018   

 

   



                 Start:          [facility]



                                         2018   

 

   



                           Bacteria identified in Urine   [facility] ([id number])

 

   



                           Patient Education         [facility] ([id number])

 

   



                           Patient referral          [facility] ([id number])



                                                                                
                                     



Problems

                      Active Problems            

            



     



            Problem    Problem    Date Last  Documented  Episodic/Chr  Provider



                 Classificati    Recorded        Date            on 



                                         on     

 

     



                 E Codes:        Accidents caused by other specified    Episodic

        PETER LARA



                           Cut/pierceb               cutting and piercing instru

ments or    



                           (9 sources)               objects    

 

     



                 E Codes:        Unspecified fall, initial encounter    Episodic

        PETER LARA



                                         Fall     



                                         (7 sources)     

 

     



                 E Codes:        Home accidents    Episodic        PETER LARA



                                         Place of     



                                         occurrence     



                                         (9 sources)     

 

     



                 E Codes:        Other external cause status    Episodic        

PETER LARA



                                         Unspecified     



                                         (9 sources)     

 

     



                 Genitourinar    Personal history of other diseases    Episodic 

       MATT MISA



                           y symptoms                of urinary system    



                                         and     



                                         ill-defined     



                                         conditions     



                                         (4 sources)     

 

     



              Hemorrhage   Hemorrhage in early pregnancy,  2020   Episodic

     NA NA



                           during                    unspecified ; Translations:

    



                           pregnancy;                [Hemorrhage in early pregna

ncy]    



                                         abruptio     



                                         placenta;     



                                         placenta     



                                         previa     



                                         

     



                                         (20     



                                         sources)     

 

     



                 Menstrual       Irregular menstruation, unspecified    Chronic 

        MATT MISA



                                         disorders     



                                         (4 sources)     

 

     



                 Open wounds     Open wound of finger(s),    Episodic        PET

ER LARA



                           of                        complicated ; Translations:

    



                           extremities               [Laceration without foreign

 body,    



                           (22 sources)              right lower leg, initial en

counter]    

 

     



                 Other           Streptococcus B carrier state    Episodic      

  NORMAN



                     complication        complicating childbirth     HIGGINBOTHA

M



                           s of birth;               MD



                                         puerperium     



                                         affecting     



                                         management     



                                         of mother     



                                         (12 sources)     

 

     



                 Other           Other specified diseases and    Episodic       

 PETER JANE



                           complication              conditions complicating pre

gnancy,    



                           s of                      childbirth and the puerperi

um    



                                         pregnancy     



                                         (18 sources)     

 

     



                 Other           Maternal care for other    Episodic        MEY

R LARA



                           complication              abnormalities of pelvic org

ans,    



                           s of                      unspecified trimester    



                                         pregnancy     



                                         (8 sources)     

 

     



              Other        Spotting complicating pregnancy,  2020   Episod

ic     NORMAN



                     complication        second trimester     VAL



                           s of                      MD



                                         pregnancy     



                                         (1 source)     

 

     



              Other        Pain in left lower leg  2020   Episodic     TRA

VIS



                           connective                BERNOT



                                         tissue     



                                         disease     



                                         (7 sources)     

 

     



              Other        Other specified soft tissue  2020   Episodic   

  NORMAN



                     connective          disorders           VAL



                           tissue                    MD



                                         disease     



                                         (1 source)     

 

     



              Other        Pain in right lower leg  2020   Episodic     DE

NNIS



                           connective                VAL



                           tissue                    MD



                                         disease     



                                         (1 source)     

 

     



                 Other female    Abnormal uterine and vaginal    Chronic        

 MATT MISA



                           genital                   bleeding, unspecified    



                                         disorders     



                                         (4 sources)     

 

     



                 Other female    Abnormal uterine bleeding    Chronic         LO

JOHANNE NO



                                         genital     



                                         disorders     



                                         (2 sources)     

 

     



                 Other female    Other specified noninflammatory    Episodic    

    LALA



                     genital             disorders of vagina     MAGDIEL PA



                                         disorders     



                                         (9 sources)     

 

     



              Other female  Other specified noninflammatory  2020   Episod

ic     NORAMN



                     genital             disorders of vagina     VAL



                           disorders                 MD



                                         (1 source)     

 

     



                 Other           Irritable bowel syndrome    Chronic         TYG

HE



                           gastrointest              MARGUERITE TRAMMELL



                                         inal     



                                         disorders     



                                         (1 source)     

 

     



                 Other           Pain in left knee    Episodic        DOROTHY



                           non-traumati              BERNOT



                                         c joint     



                                         disorders     



                                         (6 sources)     

 

     



                 Other           Encounter for supervision of other    Episodic 

       NORMAN



                     pregnancy           normal pregnancy, first trimester ;    

 VAL



                     and delivery        Translations: [Normal pregnancy]     MD



                                         including     



                                         normal     



                                         

     



                                         (20     



                                         sources)     

 

     



                 Polyhydramni    Oligohydramnios, third trimester,    Episodic  

      NORMAN



                     os and other        not applicable or unspecified     HIGGI

NBOTHAM



                           problems of               MD



                                         amniotic     



                                         cavity     



                                         

     



                                         (12     



                                         sources)     

 

     



                 Residual        11 weeks gestation of pregnancy ;    Episodic  

      DOROTHY



                     codes;              Translations: [ACQUIRED ABSENCE OF     

BERNOT



                           unclassified              OTHER ORGANS]    



                                         (10 sources)     

 

     



                 Residual        Acquired absence of other organs ;    Episodic 

       ANGELA MANFRED



                           codes;                    Translations: [PERSONAL HIS

TORY OF    



                           unclassified              COMP OF PREG, CHLDBR]    



                                         (22 sources)     

 

     



                 Residual        Other specified postprocedural    Episodic     

   DOROTHY



                     codes;              states              BERNOT



                                         unclassified     



                                         (20 sources)     

 

     



                 Residual        Personal history of other    Episodic        AM

Y MANFRED



                           codes;                    complications of pregnancy,

    



                           unclassified              childbirth and the puerperi

um    



                                         (11 sources)     

 

     



                 Residual        38 weeks gestation of pregnancy    Episodic    

    NORMAN



                           codes;                    VAL ulloa MD



                                         (12 sources)     

 

     



                 Residual        High-risk sexual behavior    Episodic        GR

ETCHEN



                           codes;                    MAGDIEL BLEVINS



                                         unclassified     



                                         (9 sources)     

 

     



                 Residual        Less than 8 weeks gestation of    Episodic     

   TC



                     codes;              pregnancy ; Translations: [PERSONAL    

 FENECH DO



                           unclassified              HISTORY OF COMP OF PREG, CH

LDBR]    



                                         (13 sources)     

 

     



                 Residual        Weeks of gestation of pregnancy not    Episodic

        RACH LARA



                           codes;                    specified    



                                         unclassified     



                                         (12 sources)     

 

     



                 Residual        Family history of malignant    Episodic        

ANGELA MANFRED



                           codes;                    neoplasm of ovary    



                                         unclassified     



                                         (4 sources)     

 

     



                 Residual        8 weeks gestation of pregnancy    Episodic     

   MARIA GUADALUPE



                           codes;                    SACHI ulloa MD



                                         (2 sources)     

 

     



              Residual     27 weeks gestation of pregnancy  2020   Episodi

c     NORMAN



                           codes;                    VAL ulloa MD



                                         (1 source)     

 

     



                 Skin and        Cellulitis and abscess of finger,    Episodic  

      PETER LARA



                           subcutaneous              unspecified    



                                         tissue     



                                         infections     



                                         (9 sources)     

 

     



                 Spontaneous     Miscarriage ; Translations:    Episodic        

DOROTHY



                                 [Incomplete miscarriage]     BERNOT



                                         (22 sources)     



            

            Past or Other Problems            

            



     



            Problem    Problem    Date Last  Documented  Episodic/Chr  Provider



                 Classificati    Recorded        Date            onic 



                                         on     

 

     



                 Nausea and      Nausea and vomiting ; Translations:    Episodic

        THEA



                     vomiting            [Nausea and vomiting]     [place]



                           (6 sources)               Work Phone:



                                         [phone]



                                         0

 

     



                 Other           Missed  ; Translations: [ -    Episodic

        THEA



                     complication        Missed  O02.1]     [place]



                           s of                      Other Phone:



                           pregnancy                 [phone]



                           (8 sources)               0

 

     



                 Other           Urinary tract infection in    Episodic        M

ARGARET



                     complication        pregnancy ; Translations: [UTI     BARN

IDGE



                     s of                (urinary tract infection) in     Other 

Phone:



                     pregnancy           pregnancy in first trimester]     [phone]



                           (8 sources)               0

 

     



                 Other           Morning sickness ; Translations:    Episodic   

     THEA



                     complication        [Nausea and vomiting during     [place]

E



                     s of                pregnancy]          Other Phone:



                           pregnancy                 [phone]



                           (8 sources)               0

 

     



                 Other           Vomiting of pregnancy, unspecified    Episodic 

       THEA



                     complication        ; Translations: [ - Nausea and     BARN

IDGE



                     s of                vomiting during pregnancy O21.9]     Ot

her Phone:



                           pregnancy                 [phone]



                           (8 sources)               0

 

     



                 Other           Unspecified infection of urinary    Episodic   

     THEA



                     complication        tract in pregnancy, first trimester    

 [place]



                     s of                ; Translations: [ - UTI (urinary     Ot

her Phone:



                     pregnancy           tract infection) in pregnancy in     [phone]

[phone]



                     (8 sources)         first trimester O23.41]     0

 

     



              Other        Mild hyperemesis gravidarum  2020   Episodic   

  ANGELA MANFRED



                           complication              ARNP



                                         s of     



                                         pregnancy     



                                         (4 sources)     

 

     



              Other        Unspecified infection of urinary  2020   Episod

ic     ANGELA MANFRED



                     complication        tract in pregnancy, second     ARNP



                           s of                      trimester    



                                         pregnancy     



                                         (2 sources)     

 

     



                 Other           Laceration - injury ; Translations:    Episodic

        LOCAL NO



                           injuries and              [Laceration]    



                                         conditions     



                                         due to     



                                         external     



                                         causes     



                                         

     



                                         (5 sources)     

 

     



                 Other           Knee pain ; Translations: [Knee    Episodic    

    LOCAL NO



                           non-traumati              pain]    



                                         c joint     



                                         disorders     



                                         (5 sources)     

 

     



                 Residual        History of uterine scar from    Episodic       

 THEA



                     codes;              previous surgery ; Translations: [     

[place]



                     unclassified        - Previous  section     Other P

shaw:



                     (8 sources)         Z98.891]            [phone]



                                         0

 

     



              Residual     14 weeks gestation of pregnancy  2020   Episodi

c     ANGELA MANFRED



                           codes;                    ARNP



                                         unclassified     



                                         (2 sources)     

 

     



                 Substance-re    Drug use complicating pregnancy,    Episodic   

     THEA



                     lated               first trimester ; Translations:     BAR

[facility]IDGE



                     disorders           [Maternal drug use]     Other Phone:



                           (16 sources)              [phone]



                                         0

 

     



                     Unclassified        Finding of sensation of abdomen     LOC

AL NO



                                         (2 sources)     



                                                                                
                                                                                
                                                                                
                                                                                
                                                                                
                                                                                
                                                                                
 



Procedures

                      



   



                 Date            Procedure       Procedure Detail  Performing Cl

inician

 

   



                     Start:              Ultrasonography     DOROTHYANSON SABAJAMMIE



                           2018                for antepartum  



                                         monitoring of  



                                         fetus  

 

   



                     Start:              Collection venous   THEA ALAYNANIDGE



                     2018          blood venipuncture   Other Phone: [phone]

 

   



                     Start:              Fetal chromosomal   THEA ANGELINADGE



                     2018          aneuploidy genomic   Other Phone: [phone]



                                         seq analys  

 

   



                     Start:              Collection venous   THEA ALAYNANIDGE



                     2018          blood venipuncture   Other Phone: [phone]

 

   



                     Start:              Drug test prsmv     THEA ANGELINADGE



                     2018          read direct         Other Phone: [phone] 



                                         optical obs pr  



                                         date  

 

   



                           H/O:              THEA COOK



                           section                   Other Phone: [phone]

 

   



                     Start:              X-ray of right      DOROTHY ESTRELLA



                           2018                knee  

 

   



                     Start:              Urine pregnancy     MILNEA BLACK



                     10-          test visual color   Other Phone: [phone] 



                                         cmprsn meths  

 

   



                     Start:              Us preg uterus      THEA COOK



                     2018          real time w/image   Other Phone: [phone] 



                                         dcmtn transvag  

 

   



                     Start:              Handlg&/or convey   THEA COOK



                     2018          of spec for tr      Other Phone: [phone] 



                                         office to lab  

 

   



                     Start:              Infectious agent    THEA COOK



                     2018          enzymatic actv      Other Phone: [phone] 



                                         oth/thn virus  

 

   



                     Start:              LAB NOT BILLED BY   THEA COOK



                     2018          [facility]              Other Phone: [phone] 

 

   



                     Start:              Urine pregnancy     THEA COOK



                     2018          test visual color   Other Phone: [phone] 



                                         cmprsn meths  

 

   



                     Start:              Urnls dip           THEA COOK



                     2018          stick/tablet rgnt   Other Phone: [phone] 



                                         auto w/o  



                                         microscopy  

 

   



                     Start:              Urnls dip           THEA COOK



                     2018          stick/tablet rgnt   Other Phone: [phone] 



                                         non-auto w/o  



                                         micrscp  

 

   



                     Start:              Us preg uterus      THEA COOK



                     2018          real time w/image   Other Phone: [phone] 



                                         dcmtn transvag  

 

   



                     Start:              EXTRACTION OF POC,   NORMAN FLOREZ MD



                           2017                LOW CERVICAL, OPEN  



                                         AP  

 

   



                           EXTRACTION OF POC,        NORMAN NEAL MD



                                         LOW CERVICAL, OPEN  



                                         AP  



                                                                                
                                                                                
                                                                                
     



Results

                      



     



            Test Name  Value      Interpreta  Reference  Facilit    Date



                 tion            Range           y               Time

 

 



                                         pregnancy test,



                                         urine (in house)



                                         on



                                         null

 

     



                     PREGNANCY TEST,     [id number]          Communi 



                           URINE (IN HOUSE)          [facility] 



                                         ([id number]) 

 

     



                     PREGNANCY TEST,     2020           Communi 



                           URINE (IN HOUSE)          [facility] 



                                         ([id number]) 

 

 



                                         not yet categorized



                                         on 2020

 

     



                 WET PREP RESULTS  NO YEAST OBSERVED, NO TRICHOMONAS OBSERVED~NO

  Invalid         

PENDING                                  



                 CLUE CELLS OBSERVED~NO WBC'S OBSERVED  Interpreta      [facility] 

        020



                     tion Code           N [facility]               11:10-0



                           (22191)                   400

 

 



                                         laboratory



                                         on



                                         2020

 

     



              Bacteria identified  NG           Invalid      PENDING      



                 Cx Nom (U)      Interpreta      [facility]         020



                     tion Code           N [facility]               11:10-0



                           (01929)                   400

 

     



              Bacteria LM Ql  FEW          Abnormal     PENDING      



                     (Urine sed)         [facility]             020



                           [facility]                     11:10-0



                           (71936)                   400

 

     



              Bilirubin Ql (U)  Negative     NEGATIVE     PENDING      



                           [facility]                   020



                           [facility]                     11:10-0



                           (83104)                   400

 

     



                 Casts LM Ql (Urine  NONE            PENDING         



                     sed)                [facility]             020



                           [facility]                     11:10-0



                           (55979)                   400

 

     



                 Clarity (U)     CLEAR           PENDING         



                           [facility]                   020



                           [facility]                     11:10-0



                           (84329)                   400

 

     



                 Color (U)       YELLOW          PENDING         



                           LOC[facility]                   020



                           [facility]                     11:10-0



                           (32958)                   400

 

     



                 Crystals LM Ql  NONE            PENDING         



                     (Urine sed)         [facility]             020



                           N [facility]                     11:10-0



                           (41692)                   400

 

     



                 Epithelial      RARE            PENDING         



                     cells.squamous LM Ql     LOC[facility]             020



                     (Urine sed)         N [facility]               11:10-0



                           (75631)                   400

 

     



              Glucose Auto test  Negative     NEGATIVE     PENDING      



                     strip Ql (U)        [facility]             020



                           N [facility]                     11:10-0



                           (17336)                   400

 

     



              Ketones Auto test  Negative     NEGATIVE     PENDING      



                     strip Ql (U)        LOC[facility]             020



                           N [facility]                     11:10-0



                           (99525)                   400

 

     



              Leukocyte esterase  Negative     NEGATIVE     PENDING      



                     Test strip Ql (U)     LOC[facility]             020



                           N [facility]                     11:10-0



                           (80020)                   400

 

     



                 Mucus Ql (Urine sed)  Negative        PENDING         



                           LOCATIO                   020



                           N [facility]                     11:10-0



                           (92448)                   400

 

     



              Nitrite Ql (U)  Negative     NEGATIVE     PENDING      



                           LOCATIO                   020



                           N [facility]                     11:10-0



                           (03448)                   400

 

     



              pH (U)       7.0 [pH]     5-9          PENDING      



                           LOCATIO                   [address] [place] [address]                     11:10-0



                           (19275)                   400

 

     



              Protein Ql (U)  Negative     NEGATIVE     PENDING      



                           LOC[address]                     11:10-0



                           (96871)                   400

 

     



                 RBC LM.HPF (Urine   Abnormal        PENDING         



                     sed) [#/Area]       [address]                     11:10-0



                           (46215)                   400

 

     



            RBC Ql (U)  2+         Abnormal   NEGATIVE   PENDING    



                           LOCATI[address]                     11:10-0



                           (16730)                   400

 

     



            Specific gravity (U)  1.010      Low        1.016-1.02  PENDING    0





                 [Rel density]    2               LOC[address]                     11:10-0



                           (02104)                   400

 

     



                 Urinalysis complete  YES             PENDING         



                     [facility]



                     panel - Urine       [facility]               11:10-0



                           (94413)                   400

 

     



              Urobilinogen (U)  0.2 mg/dL    < = 1.0      PENDING      



                 [Mass/Vol]      mg/dL           [address]                     11:10-0



                           (72176)                   400

 

     



                     WBC LM.HPF (Urine     PENDING             



                     sed) [#/Area]       [address]                     11:10-0



                           (98172)                   400

 

 



                                         wbc lm.hpf (urine



                                         sed) [#/area]



                                         on



                                         2018

 

     



                           WBC LM.HPF (Urine         [facility] 



                           sed) [#/Area]             on Via 



                                         [facility] 



                                         (13238) 

 

 



                                         wbc auto (bld)



                                         [#/vol]



                                         on



                                         2018

 

     



                 WBC (Bld) [#/Vol]  5.8 10*3/uL     4.3-11.0        [facility] 



                                         on Via 



                                         [facility] 



                                         l 



                                         (47164) 

 

 



                                         urobilinogen auto



                                         test strip (u)



                                         [mass/vol]



                                         on



                                         2018

 

     



                 Urobilinogen (U)  NORMAL          NORMAL          [facility] 



                           [Mass/Vol]                on Via 



                                         [facility] 



                                         [facility] 



                                         [facility] 



                                         (36042) 

 

 



                                         urinalysis complete



                                         w reflex culture



                                         panel (u)



                                         on



                                         2018

 

     



                     Urinalysis complete  NO                  [facility] 



                           W Reflex Culture          on Via 



                           panel - Urine             [facility] 



                                         l 



                                         (38326) 

 

 



                                         urea



                                         nitrogen/creatinine



                                         [mass ratio]



                                         on



                                         2018

 

     



                     Urea                10 mg/mg            [facility] 



                           nitrogen/Creatinine       on Via 



                           [Mass ratio]              [facility] 



                                         l 



                                         (25458) 

 

 



                                         urea nitrogen



                                         [mass/vol]



                                         on



                                         2018

 

     



                 Urea nitrogen   7 mg/dL         7-18            [facility] 



                           [Mass/Vol]                on Via 



                                         [facility] 



                                         l 



                                         (58923) 

 

 



                                         thyroid stimulating



                                         hormone



                                         on



                                         2018

 

     



                 TSH Qn          0.57 m[IU]/L    0.35-4.94       [facility] 



                                         on Via 



                                         [facility] 



                                         l 



                                         (37991) 

 

 



                                         specific gravity



                                         test strip (u) [rel



                                         density]



                                         on



                                         2018

 

     



              Specific gravity (U)  1.015        Invalid      1.016-1.02   [facility]

si 



                 [Rel density]   Interpreta      2               on Via 



                           tion Code                 [facility] 



                                         l 



                                         (35737) 

 

 



                                         sodium [moles/vol]



                                         on 2018

 

     



                 Sodium [Moles/Vol]  142 mmol/L      135-145         [facility] 



                                         on Via 



                                         [facility] 



                                         l 



                                         (50303) 

 

 



                                         rbc lm.hpf (urine



                                         sed) [#/area]



                                         on



                                         2018

 

     



                 RBC LM.HPF (Urine  TNTC            Invalid         [facility] 



                     sed) [#/Area]       Interpreta          on Via 



                           tion Code                 [facility] 



                                         l 



                                         (25705) 

 

 



                                         rbc lm ql (urine



                                         sed)



                                         on 2018

 

     



              RBC Ql (U)   5+           Invalid      NEGATIVE     [facility] 



                           Interpreta                on Via 



                           tion Code                 [facility] 



                                         l 



                                         (55825) 

 

 



                                         rbc auto (bld)



                                         [#/vol]



                                         on



                                         2018

 

     



                 RBC (Bld) [#/Vol]  5.18 10*6/uL    4.35-5.85       [facility] 



                                         on Via 



                                         [facility] 



                                         l 



                                         (77148) 

 

 



                                         protein test strip



                                         ql (u)



                                         on 2018

 

     



              Protein Ql (U)  1+           Invalid      NEGATIVE     [facility] 



                           Interpreta                on Via 



                           tion Code                 [facility] 



                                         l 



                                         (33364) 

 

 



                                         protein [mass/vol]



                                         on 2018

 

     



              Protein [Mass/Vol]  8.3 g/dL     High         6.4-8.2      [facility]

 



                                         on Via 



                                         [facility] 



                                         l 



                                         (90498) 

 

 



                                         potassium



                                         [moles/vol]



                                         on



                                         2018

 

     



              Potassium    3.4 mmol/L   Low          3.6-5.0      [facility] 



                           [Moles/Vol]               on Via 



                                         [facility] 



                                         l 



                                         (38126) 

 

 



                                         platelets auto (bld)



                                         [#/vol]



                                         on



                                         2018

 

     



                 Platelets (Bld)  259 10*3/uL     130-400         [facility] 



                           [#/Vol]                   on Via 



                                         [facility] 



                                         l 



                                         (77282) 

 

 



                                         platelet mean volume



                                         auto (bld) [entitic



                                         vol]



                                         on 2018

 

     



              Platelet mean volume  11.7 fL      High         7.4-10.4     [facility]

si 



                           (Bld) [Entitic vol]       on Via 



                                         [facility] 



                                         l 



                                         (91994) 

 

 



                                         ph test strip (u)



                                         on



                                         2018

 

     



                 pH (U)          7 [pH]          5-9             [facility] 



                                         on Via 



                                         [facility] 



                                         l 



                                         (71209) 

 

 



                                         nitrite test strip



                                         ql (u)



                                         on 2018

 

     



                 Nitrite Ql (U)  Negative        NEGATIVE        [facility] 



                                         on Via 



                                         [facility] 



                                         l 



                                         (48258) 

 

 



                                         neutrophils/100 wbc



                                         auto (bld)



                                         on



                                         2018

 

     



                 Neutrophils/100 WBC  55 %            42-75           [facility] 



                           (Bld)                     on Via 



                                         [facility] 



                                         l 



                                         (11624) 

 

 



                                         neutrophils auto



                                         (bld) [#/vol]



                                         on



                                         2018

 

     



                 Neutrophils (Bld)  3.2 10*3/uL     1.8-7.8         [facility] 



                           [#/Vol]                   on Via 



                                         [facility] 



                                         l 



                                         (69541) 

 

 



                                         mucus lm ql (urine



                                         sed)



                                         on 2018

 

     



                     Mucus Ql (Urine sed)  Negative            [facility] 



                                         on Via 



                                         [facility] 



                                         l 



                                         (44619) 

 

 



                                         monocytes/100 wbc



                                         (bld)



                                         on 2018

 

     



                 Monocytes/100 WBC  9 %             0-12            [facility] 



                           (Bld)                     on Via 



                                         [facility] 



                                         l 



                                         (67142) 

 

 



                                         monocytes auto (bld)



                                         [#/vol]



                                         on



                                         2018

 

     



                 Monocytes (Bld)  0.5 10*3/uL     0.0-1.0         [facility] 



                           [#/Vol]                   on Via 



                                         [facility] 



                                         l 



                                         ([id number]) 

 

 



                                         mcv auto (rbc)



                                         [entitic vol]



                                         on



                                         2018

 

     



                 MCV (RBC) [Entitic  87 fL           80-99           [facility] 



                           vol]                      on Via 



                                         [facility] 



                                         l 



                                         ([id number]) 

 

 



                                         mchc auto (rbc)



                                         [mass/vol]



                                         on



                                         2018

 

     



                 MCHC (RBC)      35 g/dL         32-36           [facility] 



                           [Mass/Vol]                on Via 



                                         [facility] 



                                         l 



                                         ([address]404) 

 

 



                                         mch auto (rbc)



                                         [entitic mass]



                                         on



                                         2018

 

     



                 MCH (RBC) [Entitic  30 pg           25-34           [facility] 



                           mass]                     on Via 



                                         [facility] 



                                         () 

 

 



                                         lymphocytes/100 wbc



                                         auto (bld)



                                         on



                                         2018

 

     



                 Lymphocytes/100 WBC  32 %            12-44           [facility] 



                           (Bld)                     on Via 



                                         [facility] 



                                         () 

 

 



                                         lymphocytes auto



                                         (bld) [#/vol]



                                         on



                                         2018

 

     



                 Lymphocytes (Bld)  1.8 10*3/uL     1.0-4.0         [facility] 



                           [#/Vol]                   on Via 



                                         [facility] 



                                         ([id number]) 

 

 



                                         leukocyte esterase



                                         test strip ql (u)



                                         on



                                         2018

 

     



              Leukocyte esterase  1+           Invalid      NEGATIVE     [facility]

 



                     Test strip Ql (U)   Interpreta          on Via 



                           tion Code                 [facility] 



                                         l 



                                         (29130) 

 

 



                                         ketones auto test



                                         strip ql (u)



                                         on



                                         2018

 

     



                 Ketones Auto test  Negative        NEGATIVE        [facility] 



                           strip Ql (U)              on Via 



                                         [facility] 



                                         ([id number]) 

 

 



                                         hemoglobin (bldv)



                                         [mass/vol]



                                         on



                                         2018

 

     



                 Hemoglobin (Bld)  15.6 g/dL       11.5-16.0       [facility] 



                           [Mass/Vol]                on Via 



                                         [facility] 



                                         (21968) 

 

 



                                         hematocrit (bld)



                                         [volume fraction]



                                         on



                                         2018

 

     



                 Hematocrit (Bld)  45 %            35-52           [facility] 



                           [Volume fraction]         on Via 



                                         [facility] 



                                         ([id number]) 

 

 



                                         glucose auto test



                                         strip ql (u)



                                         on



                                         2018

 

     



                 Glucose Auto test  Negative        NEGATIVE        [facility] 



                           strip Ql (U)              on Via 



                                         [facility] 



                                         (97636) 

 

 



                                         glucose [mass/vol]



                                         on 2018

 

     



                 Glucose [Mass/Vol]  70 mg/dL                  [facility] 



                                         on Via 



                                         [facility] 



                                         (68354) 

 

 



                                         erythrocyte



                                         distribution width



                                         auto (rbc) [ratio]



                                         on 2018

 

     



                 Erythrocyte     12.8 %          10.0-14.5       [facility] 



                           distribution width        on Via 



                           (RBC) [Ratio]             [facility] 



                                         l 



                                         (21913) 

 

 



                                         eosinophils/100 wbc



                                         auto (bld)



                                         on



                                         2018

 

     



                 Eosinophils/100 WBC  3 %             0-10            [facility] 



                           (Bld)                     on Via 



                                         [facility] 



                                         (75609) 

 

 



                                         eosinophils auto



                                         (bld) [#/vol]



                                         on



                                         2018

 

     



                 Eosinophils (Bld)  0.2 10*3/uL     0.0-0.3         [facility] 



                           [#/Vol]                   on Via 



                                         [facility] 



                                         l 



                                         (34540) 

 

 



                                         crystals lm ql



                                         (urine sed)



                                         on



                                         2018

 

     



                     Crystals LM Ql      NONE                [facility] 



                           (Urine sed)               on Via 



                                         [facility] 



                                         l 



                                         (46027) 

 

 



                                         crp [mass/vol]



                                         on



                                         2018

 

     



                 CRP [Mass/Vol]  0.04 mg/L       0.00-0.50       [facility] 



                                         on Via 



                                         [facility] 



                                         l 



                                         (62503) 

 

 



                                         creatinine and



                                         glomerular



                                         filtration



                                         rate.predicted panel



                                         (s/p/bld)



                                         on



                                         2018

 

     



                           GFR/1.73 sq               [facility] 



                           M.predicted among         on Via 



                           non-blacks MDRD           [facility] 



                           (S/P/Bld) [Vol            Hospita 



                           rate/Area]                l 



                                         (15055) 

 

 



                                         creatinine



                                         [mass/vol]



                                         on



                                         2018

 

     



                 Creatinine      0.69 mg/dL      0.60-1.30       [facility] 



                           [Mass/Vol]                on Via 



                                         [facility] 



                                         l 



                                         (06532) 

 

 



                                         color (u)



                                         on



                                         2018

 

     



                     Color (U)           YELLOW              [facility] 



                                         on Via 



                                         [facility] 



                                         l 



                                         (92889) 

 

 



                                         clarity (u)



                                         on



                                         2018

 

     



                 Clarity (U)     VERY CLOUDY     Invalid         [facility] 



                           Interpreta                on Via 



                           tion Code                 [facility] 



                                         l 



                                         (23167) 

 

 



                                         chloride [moles/vol]



                                         on 2018

 

     



                 Chloride [Moles/Vol]  102 mmol/L                [facility] 



                                         on Via 



                                         [facility] 



                                         l 



                                         (86303) 

 

 



                                         casts lm ql (urine



                                         sed)



                                         on 2018

 

     



                     Casts LM Ql (Urine  NONE                [facility] 



                           sed)                      on Via 



                                         [facility] 



                                         l 



                                         (38467) 

 

 



                                         carbon dioxide



                                         on



                                         2018

 

     



                 CO2 [Moles/Vol]  31 mmol/L       21-32           [facility] 



                                         on Via 



                                         [facility] 



                                         l 



                                         (34868) 

 

 



                                         calcium [mass/vol]



                                         on 2018

 

     



              Calcium [Mass/Vol]  10.4 mg/dL   High         8.5-10.1     [facility]

 



                                         on Via 



                                         [facility] 



                                         l 



                                         (29052) 

 

 



                                         bilirubin test strip



                                         ql (u)



                                         on 2018

 

     



                 Bilirubin Ql (U)  Negative        NEGATIVE        [facility] 



                                         on Via 



                                         [facility] 



                                         l 



                                         (45736) 

 

 



                                         bilirubin [mass/vol]



                                         on 2018

 

     



                 Bilirubin [Mass/Vol]  0.4 mg/dL       0.1-1.0         [facility] 



                                         on Via 



                                         [facility] 



                                         l 



                                         () 

 

 



                                         basophils/100 wbc



                                         auto (bld)



                                         on



                                         2018

 

     



                 Basophils/100 WBC  1 %             0-10            [facility] 



                           (Bld)                     on Via 



                                         [facility] 



                                         l 



                                         () 

 

 



                                         basophils auto (bld)



                                         [#/vol]



                                         on



                                         2018

 

     



                 Basophils (Bld)  0.0 10*3/uL     0.0-0.1         [facility] 



                           [#/Vol]                   on Via 



                                         [facility] 



                                         l 



                                         () 

 

 



                                         bacteria lm ql



                                         (urine sed)



                                         on



                                         2018

 

     



                 Bacteria LM Ql  FEW             Invalid         [facility] 



                     (Urine sed)         Interpreta          on Via 



                           tion Code                 [facility] 



                                         l 



                                         () 

 

 



                                         ast [catalytic



                                         activity/vol]



                                         on



                                         2018

 

     



                 AST [Catalytic  17 U/L          5-34            [facility] 



                           activity/Vol]             on Via 



                                         [facility] 



                                         () 

 

 



                                         anion gap



                                         [moles/vol]



                                         on



                                         2018

 

     



                 Anion gap       9 mmol/L        5-14            [facility] 



                           [Moles/Vol]               on Via 



                                         [facility] 



                                         () 

 

 



                                         alt [catalytic



                                         activity/vol]



                                         on



                                         2018

 

     



                 ALT [Catalytic  16 U/L          0-55            [facility] 



                           activity/Vol]             on Via 



                                         [facility] 



                                         () 

 

 



                                         alp [catalytic



                                         activity/vol]



                                         on



                                         2018

 

     



                 ALP [Catalytic  54 U/L                    [facility] 



                           activity/Vol]             on Via 



                                         [facility] 



                                         () 

 

 



                                         albumin [mass/vol]



                                         on 2018

 

     



              Albumin [Mass/Vol]  5.1 g/dL     High         3.2-4.5      [facility]

 



                                         on Via 



                                         [facility] 



                                         l 



                                         (06064) 

 

 



                                         wbc auto (bld)



                                         [#/vol]



                                         on



                                         2018

 

     



                 WBC (Bld) [#/Vol]  10.8 10*3/uL    4.3-11.0        Via 



                                         [facility] 



                                         l 



                                         [place] 



                                         rg 



                                         (75975) 

 

 



                                         urea



                                         nitrogen/creatinine



                                         [mass ratio]



                                         on



                                         2018

 

     



                     Urea                11 mg/mg            Via 



                           nitrogen/Creatinine       [facility] 



                           [Mass ratio]              [facility] 



                                         l 



                                         [facility] 



                                         rg 



                                         ([address]85) 

 

 



                                         urea nitrogen



                                         [mass/vol]



                                         on



                                         2018

 

     



              Urea nitrogen  6 mg/dL      Low          7-18         [facility] 



                           [Mass/Vol]                [facility] 



                                         [facility] 



                                         ([address]648) 

 

 



                                         sodium [moles/vol]



                                         on 2018

 

     



                 Sodium [Moles/Vol]  138 mmol/L      135-145         Via 



                                         [facility] 



                                         [facility] 



                                         ([address]512) 

 

 



                                         rbc auto (bld)



                                         [#/vol]



                                         on



                                         2018

 

     



              RBC (Bld) [#/Vol]  3.94 10*6/uL  Low          4.35-5.85    Via 



                                         [facility] 



                                         [facility] 



                                         (98444) 

 

 



                                         protein [mass/vol]



                                         on 2018

 

     



              Protein [Mass/Vol]  6.1 g/dL     Low          6.4-8.2      Via 



                                         [facility] 



                                         [facility] 



                                         (95607) 

 

 



                                         potassium



                                         [moles/vol]



                                         on



                                         2018

 

     



                 Potassium       3.6 mmol/L      3.6-5.0         Via 



                           [Moles/Vol]               [facility] 



                                         [facility] 



                                         ([id number]) 

 

 



                                         platelets auto (bld)



                                         [#/vol]



                                         on



                                         2018

 

     



                 Platelets (Bld)  227 10*3/uL     130-400         [facility]ia 



                           [#/Vol]                   [facility] 



                                         [facility] 



                                         (91049) 

 

 



                                         platelet mean volume



                                         auto (bld) [entitic



                                         vol]



                                         on 2018

 

     



              Platelet mean volume  10.5 fL      High         7.4-10.4     Via 



                           (Bld) [Entitic vol]       [facility] 



                                         [facility] 



                                         ([id number]) 

 

 



                                         neutrophils/100 wbc



                                         auto (bld)



                                         on



                                         2018

 

     



              Neutrophils/100 WBC  85 %         High         42-75        Via 



                           (Bld)                     [facility] 



                                         [facility] 



                                         [facility] 



                                         (79694) 

 

 



                                         neutrophils auto



                                         (bld) [#/vol]



                                         on



                                         2018

 

     



              Neutrophils (Bld)  9.2 10*3/uL  High         1.8-7.8      [facility]ia 



                           [#/Vol]                   [facility] 



                                         [facility] 



                                         (00496) 

 

 



                                         monocytes/100 wbc



                                         (bld)



                                         on 2018

 

     



                 Monocytes/100 WBC  5 %             0-12            Via 



                           (Bld)                     [facility] 



                                         [facility] 



                                         ([id number]) 

 

 



                                         monocytes auto (bld)



                                         [#/vol]



                                         on



                                         2018

 

     



                 Monocytes (Bld)  0.5 10*3/uL     0.0-1.0         [facility]ia 



                           [#/Vol]                   [facility] 



                                         (54885) 

 

 



                                         mcv auto (rbc)



                                         [entitic vol]



                                         on



                                         2018

 

     



                 MCV (RBC) [Entitic  89 fL           80-99           Via 



                           vol]                      [facility] 



                                         ([id number]) 

 

 



                                         mchc auto (rbc)



                                         [mass/vol]



                                         on



                                         2018

 

     



                 MCHC (RBC)      35 g/dL         32-36           Via 



                           [Mass/Vol]                [facility] 



                                         ([id number]) 

 

 



                                         mch auto (rbc)



                                         [entitic mass]



                                         on



                                         2018

 

     



                 MCH (RBC) [Entitic  31 pg           25-34           Via 



                           mass]                     [facility] 



                                         ([id number]) 

 

 



                                         lymphocytes/100 wbc



                                         auto (bld)



                                         on



                                         2018

 

     



              Lymphocytes/100 WBC  9 %          Low          12-44        Via 



                           (Bld)                     [facility] 



                                         ([id number]) 

 

 



                                         lymphocytes auto



                                         (bld) [#/vol]



                                         on



                                         2018

 

     



                 Lymphocytes (Bld)  1.0 10*3/uL     1.0-4.0         Via 



                           [#/Vol]                   [facility] 



                                         (69589) 

 

 



                                         hemoglobin (bldv)



                                         [mass/vol]



                                         on



                                         2018

 

     



                 Hemoglobin (Bld)  12.3 g/dL       11.5-16.0       Via 



                           [Mass/Vol]                [facility] 



                                         ([id number]) 

 

 



                                         hematocrit (bld)



                                         [volume fraction]



                                         on



                                         2018

 

     



                 Hematocrit (Bld)  35 %            35-52           Via 



                           [Volume fraction]         [facility] 



                                         ([id number]) 

 

 



                                         glucose [mass/vol]



                                         on 2018

 

     



              Glucose [Mass/Vol]  111 mg/dL    High                Via 



                                         [facility] 



                                         (10345) 

 

 



                                         erythrocyte



                                         distribution width



                                         auto (rbc) [ratio]



                                         on 2018

 

     



                 Erythrocyte     12.2 %          10.0-14.5       Via 



                           distribution width        [facility] 



                           (RBC) [Ratio]             [facility] 



                                         ([id number]) 

 

 



                                         eosinophils/100 wbc



                                         auto (bld)



                                         on



                                         2018

 

     



                 Eosinophils/100 WBC  1 %             0-10            Via 



                           (Bld)                     [facility] 



                                         ([id number]) 

 

 



                                         eosinophils auto



                                         (bld) [#/vol]



                                         on



                                         2018

 

     



                 Eosinophils (Bld)  0.1 10*3/uL     0.0-0.3         Via 



                           [#/Vol]                   [facility] 



                                         (60060) 

 

 



                                         creatinine and



                                         glomerular



                                         filtration



                                         rate.predicted panel



                                         (s/p/bld)



                                         on



                                         2018

 

     



                           GFR/1.73 sq               Via 



                           M.predicted among         [facility] 



                           non-blacks MDRD           [facility] 



                           (S/P/Bld) [Vol            l 



                           rate/Area]                [facility] 



                                         ([id number]) 

 

 



                                         creatinine



                                         [mass/vol]



                                         on



                                         2018

 

     



              Creatinine   0.55 mg/dL   Low          0.60-1.30    Via 



                           [Mass/Vol]                [facility] 



                                         (33290) 

 

 



                                         chloride [moles/vol]



                                         on 2018

 

     



                 Chloride [Moles/Vol]  106 mmol/L                Via 



                                         [facility] 



                                         (66718) 

 

 



                                         carbon dioxide



                                         on



                                         2018

 

     



                 CO2 [Moles/Vol]  23 mmol/L       21-32           Via 



                                         [facility] 



                                         (47156) 

 

 



                                         calcium measurement



                                         corrected for



                                         albumin



                                         on



                                         2018

 

     



                 Albumin [Mass/Vol]  8.7 g/dL        8.5-10.1        Via 



                                         [facility] 



                                         (49150) 

 

 



                                         calcium [mass/vol]



                                         on 2018

 

     



                 Calcium [Mass/Vol]  8.8 mg/dL       8.5-10.1        Via 



                                         [facility] 



                                         (51702) 

 

 



                                         bilirubin [mass/vol]



                                         on 2018

 

     



                 Bilirubin [Mass/Vol]  0.2 mg/dL       0.1-1.0         Via 



                                         [facility] 



                                         (27560) 

 

 



                                         basophils/100 wbc



                                         auto (bld)



                                         on



                                         2018

 

     



                 Basophils/100 WBC  0 %             0-10            Via 



                           (Bld)                     [facility] 



                                         (47537) 

 

 



                                         basophils auto (bld)



                                         [#/vol]



                                         on



                                         2018

 

     



                 Basophils (Bld)  0.0 10*3/uL     0.0-0.1         Via 



                           [#/Vol]                   [facility] 



                                         (09010) 

 

 



                                         ast [catalytic



                                         activity/vol]



                                         on



                                         2018

 

     



                 AST [Catalytic  13 U/L          5-34            Via 



                           activity/Vol]             [facility] 



                                         (10444) 

 

 



                                         anion gap



                                         [moles/vol]



                                         on



                                         2018

 

     



                 Anion gap       9 mmol/L        5-14            Via 



                           [Moles/Vol]               [facility] 



                                         (43166) 

 

 



                                         alt [catalytic



                                         activity/vol]



                                         on



                                         2018

 

     



                 ALT [Catalytic  12 U/L          0-55            Via 



                           activity/Vol]             [facility] 



                                         (88791) 

 

 



                                         alp [catalytic



                                         activity/vol]



                                         on



                                         2018

 

     



                 ALP [Catalytic  42 U/L                    Via 



                           activity/Vol]             [facility] 



                                         (21368) 

 

 



                                         albumin [mass/vol]



                                         on 2018

 

     



                 Albumin [Mass/Vol]  4.1 g/dL        3.2-4.5         Via 



                                         [facility] 



                                         (09550) 

 

 



                                         wbc auto (bld)



                                         [#/vol]



                                         on



                                         2018

 

     



                 WBC (Bld) [#/Vol]  6.6 10*3/uL     4.3-11.0        Via 



                                         [facility] 



                                         (93101) 

 

 



                                         urea



                                         nitrogen/creatinine



                                         [mass ratio]



                                         on



                                         2018

 

     



                     Urea                11 mg/mg            Via 



                           nitrogen/Creatinine       [facility] 



                           [Mass ratio]              [facility] 



                                         (51085) 

 

 



                                         urea nitrogen



                                         [mass/vol]



                                         on



                                         2018

 

     



                 Urea nitrogen   7 mg/dL         7-18            Via 



                           [Mass/Vol]                [facility] 



                                         (45694) 

 

 



                                         sodium [moles/vol]



                                         on 2018

 

     



                 Sodium [Moles/Vol]  139 mmol/L      135-145         Via 



                                         [facility] 



                                         (64593) 

 

 



                                         rbc auto (bld)



                                         [#/vol]



                                         on



                                         2018

 

     



                 RBC (Bld) [#/Vol]  4.64 10*6/uL    4.35-5.85       Via 



                                         [facility] 



                                         (38584) 

 

 



                                         protein [mass/vol]



                                         on 2018

 

     



                 Protein [Mass/Vol]  7.2 g/dL        6.4-8.2         Via 



                                         [facility] 



                                         (70424) 

 

 



                                         potassium



                                         [moles/vol]



                                         on



                                         2018

 

     



              Potassium    3.5 mmol/L   Low          3.6-5.0      Via 



                           [Moles/Vol]               [facility] 



                                         (26192) 

 

 



                                         platelets auto (bld)



                                         [#/vol]



                                         on



                                         2018

 

     



                 Platelets (Bld)  256 10*3/uL     130-400         Via 



                           [#/Vol]                   [facility] 



                                         (76929) 

 

 



                                         platelet mean volume



                                         auto (bld) [entitic



                                         vol]



                                         on 2018

 

     



              Platelet mean volume  10.7 fL      High         7.4-10.4     Via 



                           (Bld) [Entitic vol]       [facility] 



                                         (99903) 

 

 



                                         mcv auto (rbc)



                                         [entitic vol]



                                         on



                                         2018

 

     



                 MCV (RBC) [Entitic  88 fL           80-99           Via 



                           vol]                      [facility] 



                                         (08825) 

 

 



                                         mchc auto (rbc)



                                         [mass/vol]



                                         on



                                         2018

 

     



                 MCHC (RBC)      36 g/dL         32-36           Via 



                           [Mass/Vol]                [facility] 



                                         (70348) 

 

 



                                         mch auto (rbc)



                                         [entitic mass]



                                         on



                                         2018

 

     



                 MCH (RBC) [Entitic  32 pg           25-34           Via 



                           mass]                     [facility] 



                                         (55421) 

 

 



                                         hemoglobin (bldv)



                                         [mass/vol]



                                         on



                                         2018

 

     



                 Hemoglobin (Bld)  14.6 g/dL       11.5-16.0       Via 



                           [Mass/Vol]                [facility] 



                                         (04574) 

 

 



                                         hematocrit (bld)



                                         [volume fraction]



                                         on



                                         2018

 

     



                 Hematocrit (Bld)  41 %            35-52           Via 



                           [Volume fraction]         [facility] 



                                         (55220) 

 

 



                                         glucose [mass/vol]



                                         on 2018

 

     



              Glucose [Mass/Vol]  137 mg/dL    High                Via 



                                         [facility] 



                                         [facility] 



                                         (12393) 

 

 



                                         erythrocyte



                                         distribution width



                                         auto (rbc) [ratio]



                                         on 2018

 

     



                 Erythrocyte     12.3 %          10.0-14.5       Via 



                           distribution width        [facility] 



                           (RBC) [Ratio]             [facility] 



                                         (86272) 

 

 



                                         creatinine and



                                         glomerular



                                         filtration



                                         rate.predicted panel



                                         (s/p/bld)



                                         on



                                         2018

 

     



                           GFR/1.73 sq               Via 



                           M.predicted among         [facility] 



                           non-blacks MDRD           [facility] 



                           (S/P/Bld) [Vol            l 



                           rate/Area]                [facility] 



                                         (87244) 

 

 



                                         creatinine



                                         [mass/vol]



                                         on



                                         2018

 

     



                 Creatinine      0.61 mg/dL      0.60-1.30       Via 



                           [Mass/Vol]                [facility] 



                                         [facility] 



                                         (13756) 

 

 



                                         chloride [moles/vol]



                                         on 2018

 

     



                 Chloride [Moles/Vol]  103 mmol/L                Via 



                                         [facility] 



                                         (91515) 

 

 



                                         carbon dioxide



                                         on



                                         2018

 

     



                 CO2 [Moles/Vol]  29 mmol/L       21-32           Via 



                                         [facility] 



                                         (21827) 

 

 



                                         calcium [mass/vol]



                                         on 2018

 

     



              Calcium [Mass/Vol]  10.2 mg/dL   High         8.5-10.1     Via 



                                         [facility] 



                                         (65078) 

 

 



                                         bilirubin [mass/vol]



                                         on 2018

 

     



                 Bilirubin [Mass/Vol]  0.7 mg/dL       0.1-1.0         Via 



                                         [facility] 



                                         (37431) 

 

 



                                         ast [catalytic



                                         activity/vol]



                                         on



                                         2018

 

     



                 AST [Catalytic  15 U/L          5-34            Via 



                           activity/Vol]             [facility] 



                                         (73790) 

 

 



                                         anion gap



                                         [moles/vol]



                                         on



                                         2018

 

     



                 Anion gap       7 mmol/L        5-14            Via 



                           [Moles/Vol]               [facility] 



                                         (63256) 

 

 



                                         alt [catalytic



                                         activity/vol]



                                         on



                                         2018

 

     



                 ALT [Catalytic  14 U/L          0-55            Via 



                           activity/Vol]             [facility] 



                                         (38379) 

 

 



                                         alp [catalytic



                                         activity/vol]



                                         on



                                         2018

 

     



                 ALP [Catalytic  51 U/L                    Via 



                           activity/Vol]             [facility] 



                                         (46212) 

 

 



                                         albumin [mass/vol]



                                         on 2018

 

     



              Albumin [Mass/Vol]  4.7 g/dL     High         3.2-4.5      Via 



                                         [facility] 



                                         (19933) 

 

 



                                         not yet categorized



                                         on 2018

 

     



                 ABNORMAL SABINE?   NO              Invalid         Communi 



                           Interpreta                ty 



                           tion Code                 [facility] 



                                         (34088) 

 

     



                 ABNORMAL US?    NO              Invalid         Communi 



                           Interpreta                ty 



                           tion Code                 [facility] 



                                         (03571) 

 

     



                 ADVANCED MATERNAL  NO              Invalid         Communi 



                     AGE?                Interpreta          ty 



                           tion Code                 [facility] 



                                         (32860) 

 

     



                 Gestational age in  2               Invalid         Communi 



                     days                Interpreta          ty 



                           tion Code                 [facility] 



                                         (56111) 

 

     



                 Gestational age in  11              Invalid         Communi 



                     weeks               Interpreta          ty 



                           tion Code                 [facility] 



                                         (15758) 

 

     



                 INTERPRETATION  SEE NOTE        Invalid         Communi 



                           Interpreta                ty 



                           tion Code                 [facility] 



                                         (38343) 

 

     



                 LIMITATIONS     SEE NOTE        Invalid         Communi 



                           Interpreta                ty 



                           tion Code                 [facility] 



                                         ([id number]) 

 

     



                 MICRODELETION   Not detected    Invalid         Communi 



                           Interpreta                ty 



                           tion Code                 [facility] 



                                         (17190) 

 

     



                 MICRODELETION INTERP  SEE NOTE        Invalid         Communi 



                           Interpreta                ty 



                           tion Code                 [facility] 



                                         (75292) 

 

     



                 Number of fetuses by  1               Invalid         Communi 



                     US                  Interpreta          ty 



                           tion Code                 [facility] 



                                         (76574) 

 

     



                 PERSONAL/FAM    NO              Invalid         Communi 



                     HISTORY?            Interpreta          ty 



                           tion Code                 [facility] 



                                         (82975) 

 

     



                 SPECIFICATIONS  SEE NOTE        Invalid         Communi 



                           Interpreta                ty 



                           tion Code                 [facility] 



                                         (03009) 

 

 



                                         laboratory



                                         on



                                         2018

 

     



                 Cell-free       6.69%           Invalid         Communi 



                     DNA.fetal/Cell-free   Interpreta          ty 



                     DNA.total Dosage of   tion Code           Health 



                           chromosome-specific       [place] 



                           cfDNA Sony (cfDNA)         [facility] 



                                         (72325) 

 

     



                 Chr 13 trisomy  Negative        Invalid         Communi 



                     Dosage of           Interpreta          ty 



                     chromosome-specific   tion Code           Health 



                           cfDNA Ql (cfDNA)          [facility] 



                                         (13218) 

 

     



                 Chr 18 trisomy  Negative        Invalid         Communi 



                     Dosage of           Interpreta          ty 



                     chromosome-specific   tion Code           Health 



                           cfDNA Ql (cfDNA)          [facility] 



                                         (20710) 

 

     



                 Chr 21 trisomy  Negative        Invalid         Communi 



                     Dosage of           Interpreta          ty 



                     chromosome-specific   tion Code           Health 



                           cfDNA Ql (cfDNA)          [facility] 



                                         (08279) 

 

     



                 Chr X and Y     SEE NOTE        Invalid         Communi 



                     aneuploidy Dosage of   Interpreta          ty 



                     chromosome-specific   tion Code           Health 



                           cfDNA (cfDNA)             [facility] 



                           [Interp]                  [facility] 



                                         (88154) 

 

     



                 Chr X and Y     No aneuploidy   Invalid         Communi 



                     aneuploidy Dosage of   Interpreta          ty 



                     chromosome-specific   tion Code           Health 



                           cfDNA Ql (cfDNA)          [facility] 



                                         (51493) 

 

     



                 Reference Lab Test  SEE NOTE        Invalid         Communi 



                     Method              Interpreta          ty 



                           tion Code                 [facility] 



                                         (80630) 

 

     



                 Service comment  SEE NOTE        Invalid         Communi 



                     (Unsp spec) [Interp]   Interpreta          ty 



                           tion Code                 [facility] 



                                         (65026) 

 

     



                 Y chromosome    SEE NOTE        Invalid         Communi 



                     Sequencing (cfDNA)   Interpreta          ty 



                     [Interp]            tion Code           [facility] 



                                         (66346) 

 

     



                 Y chromosome    Detected        Invalid         Communi 



                     Sequencing Ql       Interpreta          ty 



                     (cfDNA)             tion Code           [facility] 



                                         (25612) 

 

 



                                         not yet categorized



                                         on 2018

 

     



                     CLINICAL            Pregnant            Normal   



                                         INFORMATION:     

 

     



                           COMMENT                   Invalid   



                                         Interpreta   



                                         tion Code   

 

     



                           COMMENT                   Invalid   



                                         Interpreta   



                                         tion Code   

 

     



                           COMMENT                   Invalid   



                                         Interpreta   



                                         tion Code   

 

     



                           CONTACT:                  Invalid   



                                         Interpreta   



                                         tion Code   

 

     



                     Date of previous    NONE                Normal   



                                         biopsy     

 

     



                     Date of previous PAP  WNL                 Normal   



                                         smear     

 

     



                     Exp date            2019             Invalid   



                                         Interpreta   



                                         tion Code   

 

     



                           Exp date                  Negative    

 

     



                     Exp date            Positive            Invalid   



                                         Interpreta   



                                         tion Code   

 

     



                     KET                 18~clear~yellow~none~neg~neg~neg 

 Invalid   



                                         Interpreta   



                                         tion Code   

 

     



                     Last menstrual      18            Normal   



                                         period start date     

 

     



                     [place]                 neg~3+              Invalid   



                                         Interpreta   



                                         tion Code   

 

     



                     Lot #               [id number]              Invalid   



                                         Interpreta   



                                         tion Code   

 

     



                     Lot #               [id number]              Invalid   



                                         Interpreta   



                                         tion Code   

 

     



                           QUESTION/PROBLEM:         Invalid   



                                         Interpreta   



                                         tion Code   

 

     



                     SG                  1.015               Invalid   



                                         Interpreta   



                                         tion Code   

 

     



                           SPECIMEN(S)               Invalid   



                           RECEIVED:                 Interpreta   



                                         tion Code   

 

     



                     [facility]                 [id number]~2020~positive~neg~neg~neg

~pos~n  Invalid   



                           eg~neg~neg~neg~neg~neg~neg~neg~neg  Interpreta   



                                         tion Code   

 

     



                     URO                 0.2                 Invalid   



                                         Interpreta   



                                         tion Code   

 

 



                                         laboratory



                                         on



                                         2018

 

     



                           ABO group Nom (Bld)       B    

 

     



                     Bacteria identified  SEE NOTE            Invalid   



                           Aer cx Nom (Genital       Interpreta   



                           specimen)                 tion Code   

 

     



                     Bacteria identified  SEE NOTE            Abnormal   



                                         Cx Nom (U)     

 

     



                 Basophils (Bld)  0.041 10*3/uL   Normal          0-200  



                           [#/Vol]                   cells/uL  

 

     



                     Basophils/100 WBC   0.7 %               Normal   



                                         (Bld)     

 

     



                     Blood group antibody  Detected            Normal   



                                         screen Ql     

 

     



                 C. trachomatis rRNA  Not detected    Normal          NOT  



                           TIA+probe Ql (Unsp        DETECTED  



                                         spec)     

 

     



                           Cytologist Cyto           Normal   



                                         stain Nom (Cvx/Vag)     



                                         [ID]     

 

     



                 Eosinophils (Bld)  0.157 10*3/uL   Normal            



                           [#/Vol]                   cells/uL  

 

     



                     Eosinophils/100 WBC  2.7 %               Normal   



                                         (Bld)     

 

     



                 Erythrocyte     12.3 %          Normal          11.0-15.0  



                           distribution width        %  



                                         (RBC) [Ratio]     

 

     



                 HBV surface Ag IA Ql  NON-REACTIVE    Normal          NON-REACT

I  



                                         VE  

 

     



                 HCV Ab IA Ql    NON-REACTIVE    Normal          NON-REACTI  



                                         VE  

 

     



                 HCV Ab Signal/Cutoff  0.01 {ratio}    Normal          <1.00  



                                         IA [Rel units/Vol]     

 

     



                 Hematocrit (Bld)  46.0 %          High            35.0-45.0  



                           [Volume fraction]         %  

 

     



                 Hemoglobin (Bld)  15.6 g/dL       High            11.7-15.5  



                           [Mass/Vol]                g/dL  

 

     



                 HIV 1+2 Ab+HIV1 p24  NON-REACTIVE    Normal          NON-REACTI

  



                           Ag IA Ql                  VE  

 

     



                 Lymphocytes (Bld)  1.473 10*3/uL   Normal          850-3900  



                           [#/Vol]                   cells/uL  

 

     



                     Lymphocytes/100 WBC  25.4 %              Normal   



                                         (Bld)     

 

     



                 MCH (RBC) [Entitic  30.5 pg         Normal          27.0-33.0  



                           mass]                     pg  

 

     



                 MCHC (RBC)      33.9 g/dL       Normal          32.0-36.0  



                           [Mass/Vol]                g/dL  

 

     



                 MCV (RBC) [Entitic  90.0 fL         Normal          80.0-100.0 

 



                           vol]                      fL  

 

     



                           Microscopic               Normal   



                                         observation Cyto     



                                         stain Nom (Cvx)     

 

     



                 Monocytes (Bld)  0.331 10*3/uL   Normal          200-950  



                           [#/Vol]                   cells/uL  

 

     



                     Monocytes/100 WBC   5.7 %               Normal   



                                         (Bld)     

 

     



                 N. gonorrhoeae rRNA  Not detected    Normal          NOT  



                           TIA+probe Ql (Unsp        DETECTED  



                                         spec)     

 

     



                 Neutrophils (Bld)  3.799 10*3/uL   Normal          2263-3423  



                           [#/Vol]                   cells/uL  

 

     



                     Neutrophils/100 WBC  65.5 %              Normal   



                                         (Bld)     

 

     



                     pH (Bld)            8.0 [pH]            Invalid   



                                         Interpreta   



                                         tion Code   

 

     



                 Platelet mean volume  11.2 fL         Normal          7.5-12.5 

 



                           (Bld) [Entitic vol]       fL  

 

     



                 Platelets (Bld)  299 10*3/uL     Normal          140-400  



                           [#/Vol]                   Thousand/u  



                                         L  

 

     



                     Protein (U)         Negative            Invalid   



                           [Mass/Vol]                Interpreta   



                                         tion Code   

 

     



                     Protein (U)         neg~neg             Invalid   



                           [Mass/Vol]                Interpreta   



                                         tion Code   

 

     



                 RBC (Bld) [#/Vol]  5.11 10*6/uL    High            3.80-5.10  



                                         Million/uL  

 

     



                 Reagin Ab RPR Ql (S)  NON-REACTIVE    Normal          NON-REACT

I  



                                         VE  

 

     



                           Rh Nom (Bld)              Positive    

 

     



                 Rubella virus IgM IA  <20.00          Invalid         AU/mL  



                           Qn (S)                    Interpreta   



                                         tion Code   

 

     



                     Specimen source Cyto  Endocervix          Normal   



                                         stain Nom (Cvx/Vag)     

 

     



                           Statement of              Normal   



                                         adequacy Cyto stain     



                                         (Cvx/Vag) [Interp]     

 

     



                     TSH Qn              0.92 m[IU]/L        Normal   

 

     



                     VZV IgG IA Qn (S)   376.10              Normal   

 

     



                 WBC (Bld) [#/Vol]  5.8 10*3/uL     Normal          3.8-10.8  



                                         Thousand/u  



                                         L  

 

 



                                         not yet categorized



                                         on 2018

 

     



                 Exp date        +~3/31/2019     Invalid         Not 



                           Interpreta                [facility] 



                           tion Code                 le 



                                         (46059) 

 

     



                 Lot #           [id number]         Invalid         Not 



                           Interpreta                [facility] 



                           tion Code                 le 



                                         (23210) 

 

     



                 RESULTS         Negative        Invalid         Not 



                           Interpreta                [facility] 



                           tion Code                 le 



                                         (13765) 



                                                                                
                                                                                
                                                                                
                                                                                
                                                                                
                                                                                
                                                                                
                                                                                
                                                                                
                                                                                
                                                                                
                                                                                
                                                                                
                                                                                
                                                                                
                                                                                
                                                                                
                                                                                
                                                                                
                                                                                
                                                                                
                                                                                
                                                                                
                                                                                
                                                                                
                                                                                
                                                                                
                                                                                
                                                                                
                                                                                
        



Social History

                      



   



                 Date            Type            Detail          Facility

 

   



                     Start:              Denies              [facility]



                           2019                [facility] ([id number])

 

   



                 Start:          Tobacco smoking status NHIS  Never smoked tobac

co  [address] 

[facility]



                     10-          (finding)           [facility] ([id number])

 

   



                     Start:              Never a Smoker      [facility]



                           10-                [facility] ([id number])

 

   



                     Start:              Denies Use          [facility]



                           2015                [facility] (03907)

 

   



                     Start:              No                  [facility]



                           2015                [facility] ([id number])

 

   



                 Start:          Sex Assigned At Birth  Female          [facility]

[address] [facility]



                           1995                [facility] ([id number])



                                         

   



                                         End:   



                                         1996   



                                                                                
                                                



Vital Signs

                      



    



              Date Time    Vital Sign   Value        Performing Clinician  Facil

[facility]

 

    



              10-   Body height  160.02 cm    [facility]



                     11:20040          Other Phone:        [facility]



                           [phone]             [place] ([address])

 

    



              10-   Body mass index  17.75 kg/m2  [facility]



                 11:040      (BMI) [Ratio]   Other Phone:    [facility]



                           [phone]             [place] ([address]57)

 

    



              10-   Body temperature  98.2 [degF]  [facility]



                     11:20040          Other Phone:        [facility]



                           [phone]             [place] ([address]81)

 

    



              10-   Body weight  45.45 kg     [facility]



                     11:040          Other Phone:        [facility]



                           [phone]             [place] ([address]96)

 

    



              2018   Body height  160.02 cm    [facility]



                     09:          Other Phone:        [facility]



                           [phone]             [place] ([address]999)

 

    



              2018   Body mass index  17.39 kg/m2  [facility]



                 09:      (BMI) [Ratio]   Other Phone:    [facility]



                           [phone]             [place] ([address])

 

    



              2018   Body temperature  98.2 [degF]  [facility]



                     09:000400          Other Phone:        [facility]



                           [phone]             [place] ([address]14)

 

    



              2018   Body weight  44.54 kg     [employer]



                     09:000400          Other Phone:        [facility]



                           [phone]             [place] ([address]25)

 

    



              2018   Body height  160.02 cm    [employer]



                     09:000400          Other Phone:        [facility]



                           [phone]             [place] ([address]162)

 

    



              2018   Body mass index  16.97 kg/m2  [facility]



                 09:      (BMI) [Ratio]   Other Phone:    [facility]



                           [phone]             [place] ([address]75)

 

    



              2018   Body temperature  97.1 [degF]  [facility]  [employer]



                     09:          Other Phone:        [facility]



                           [phone]             [place] ([address])

 

    



              2018   Body weight  43.45 kg     [employer]



                     09:000400          Other Phone:        [facility]



                           [phone]             [place] ([address]678)



                                                                                
                                                                                
                            



Functional Status

                      The data below is from unstructured sources            



                    Query                   Response                   Date Benjamín

rded                

 

                          Patient Orientation                   Person          

          

Place                    

Time                    

Situation                    

                                        2017 3:03pm                



No Functional Status information available                                      
                              



Mental Status

                      The data below is from unstructured sourcesNo Mental 
Status Information AvailableNo Mental Status Information AvailableNo Mental 
Status Information Available                                                    
                



Evaluation note

                      



  



                     Note Date &         Note                Facility



                                         Type  

 

  



                     Evaluation          No Assessments Information Available  A

[facility]



                           note                      [facility]



                                         ([id number])



                                                                              



History general Narrative - Reported

                      



  



                     Note Date &         Note                Facility



                                         Type  

 

 



                                         History 



                                         general 



                                         Narrative - 



                                         Reported 

 

  



                                         Type

 

  



                           Surgical                  tonsillectomy and adenoidec

[facility] 



                                         History  

 

  



                     Surgical             section    2017



                                         History  

 

  



                     Hospitalizatio      vaginal bleeding    18



                                         n History  

 

  



                     Hospitalizatio      ER visit, bleeding  18



                                         n History  



                                                        [facility] ([id number])                                                     
                                                                   



History general Narrative - Reported

                      



  



                     Note Date &         Note                Facility



                                         Type  

 

 



                                         History 



                                         general 



                                         Narrative - 



                                         Reported 

 

  



                                         Type

 

  



                           Medical                   anxiety 



                                         History  

 

  



                           Surgical                  tonsillectomy and adenoidec

[facility] 



                                         History  

 

  



                     Surgical             section    2017



                                         History  

 

  



                     Hospitalizatio      vaginal bleeding    18



                                         n History  

 

  



                     Hospitalizatio      ER visit, bleeding  18



                                         n History  



                                                        [facility] ([id number])                                                     
                                                                   



Advance Directives

                      



                    Directive                   Response                   Recor

ded Date/Time       

         

 

                    Advance Directives                   No                    10:05am      

          

 

                    Health Care Power of                    No          

         17 

10:05am                

 

                    [employer]                   No                   17 

10:05am             

   

 

                    Resuscitation Status                   Full Code            

       17 

10:05am                



            



                    Directive                   Response                   Recor

ded Date/Time       

         

 

                    Advance Directives                   No                    11:10am      

          

 

                    Resuscitation Status                   Full Code            

       14 

11:10am                



            



                    Directive                   Response                   Recor

ded Date/Time       

         

 

                    Advance Directives                   No                   04

/18/15 6:31pm       

         

 

                    Resuscitation Status                   Full Code            

       04/18/15 

6:31pm                



            



                    Directive                   Response                   Recor

ded Date/Time       

         

 

                    Advance Directives                   No                    5:13pm       

         

 

                    Health Care Power of                    No          

         18 

5:13pm                

 

                    [employer]                   No                   18 

5:13pm              

  

 

                    Resuscitation Status                   Full Code            

       18 

5:13pm                



            



                    Directive                   Response                   Recor

ded Date/Time       

         

 

                    Advance Directives                   No                    3:35pm       

         

 

                    Health Care Power of                    No          

         18 

5:13pm                

 

                    [employer]                   No                   18 

5:13pm              

  

 

                    Resuscitation Status                   Full Code            

       18 

3:35pm                



            



                    Directive                   Response                   Recor

ded Date/Time       

         

 

                    Advance Directives                   No                    11:12pm      

          

 

                    Health Care Power of                    No          

         18 

11:12pm                

 

                    [employer]                   No                   18 

11:12pm             

   

 

                    Resuscitation Status                   Full Code            

       18 

11:12pm                



            



                    Directive                   Response                   Recor

ded Date/Time       

         

 

                    Advance Directives                   No                    6:09pm       

         

 

                    Health Care Power of                    No          

         18 

6:09pm                

 

                    [employer]                   No                   18 

6:09pm              

  

 

                    Resuscitation Status                   Full Code            

       18 

6:09pm                



            



                    Directive                   Response                   Recor

ded Date/Time       

         

 

                    Advance Directives                   No                    2:34pm       

         

 

                    Health Care Power of                    No          

         18 

2:34pm                

 

                    [employer]                   No                   18 

2:34pm              

  

 

                    Resuscitation Status                   Full Code            

       18 

2:34pm                



            



                    Advance Directive                   Response                

   Recorded 

Date/Time                

 

                    Advance Directives                   No                   Oc

tober 2019 

3:02pm                

 

                    Health Care Power of                    No          

         2018 2:34pm                

 

                    [employer]                   No                   2018 2:34pm   

             

 

                    Resuscitation Status                   Full Code            

       2019 3:02pm                



            



                    Advance Directive                   Response                

   Recorded 

Date/Time                

 

                    Advance Directives                   No                   De

cember 2019 

6:30pm                

 

                    Health Care Power of                    No          

         2019 6:30pm                

 

                    [employer]                   No                   2019 6:30pm    

            

 

                    Resuscitation Status                   Full Code            

       2019 6:30pm                



                                                                    



Discharge Instructions

                                    

Patient Instructions              

Physician Instructions              

              

New, Converted or Re-Newed RX: RX on Chart              

Patient Instructions:               

as directed              

[facility] to [facility]:               

as directed              

Discharge Diet: No Restrictions              

Activity as Tolerated: No              

              

Follow Up Appt:              

RTC 1 week for incision check.              

Call to make follow up appt. for patient in 4 weeks.              

              

Wound Care:              

Remove staples, apply benzoin and steri strips.              

              

Activity               

Per routine post  instructions.              

              

Please call in RX to patient pharmacy.              

              

Diet as tolerated              

              

Patient may shower or tub bathe as desired.              

              

Continue home meds              

              

              

Care Plan              

Patient Instructions:: as directed              

            No hospital discharge instructions.No hospital discharge 
instructions.No hospital discharge instruction information available.No hospital
 discharge instruction information available.No hospital discharge instruction 
information available.No hospital discharge instruction information available.No
 hospital discharge instruction information available.No hospital discharge 
instruction information available.                                              
                      



Chief Complaint and Reason for Visit

                      



                          Chief Complaint                   Lower Extremity     

           

 

                          Reason for Visit                   Knee pain          

      



            



                          Chief Complaint                   -Female           

     

 

                          Reason for Visit                   [id number]  

                  

[id number]                                  



            



                          Chief Complaint                   General Problems/Chelsie

n                

 

                          Reason for Visit                   [id number]     

           



            



                          Chief Complaint                   OB/GYN              

  

 

                          Reason for Visit                   [id number]

                



                                                                    



Summary Purpose

          [employer]                                                    
      



Family History

          Family History data not found                                         
                 



Chief Complaint

                      



                    Reason For Visit                   Effective Dates          

         Notes      

          

 

                    breast complaint                   2012               

    pimply looking 

rash on both nipples                

 

                    sore throat                   2012                   d

rainage             

   

 

                    sore throat                   2011                    

                

 

                    cough                   11/10/2011                          

          

 

                    Yearly Checkup/Physical                   2011        

                    

        

 

                    follow up                   2011                   [facility]

[facility] f[facility]up          

      

 

                    ~generic                   2010                   tend

er spot to left 

lower ribs, noticed the last two weeks, pain worse after eating                

 

                    follow up                   2010                      

              

 

                    Yearly Checkup/Physical                   2010        

                    

        

 

                    acne vulgaris                   2010                  

 ON DUAC/MINOCIN BUT

 DOESN'T USE ROUTINELY                



                                                                    



Physical Exam

                      



                    Exam Name                   System Name                   It

em Name             

                    Status                   Result                   Effective 

Dates         

                                        Notes                

 

                    Full Exam - General                   Constitutional        

            general 

appearance                   Overall:                   well developed          

                          2012                   None                

 

                    Full Exam - General                   Constitutional        

            general 

appearance                   Overall:                   well nourished          

                          2012                   None                

 

                    Full Exam - General                   Constitutional        

            general 

appearance                   Overall:                   in no acute distress    

                          2012                   None                

 

                    Full Exam - General                   Chest/Breast          

         

breast/chest inspection                   Skin appearance:                   a 

normal exam                   2012                   pt has circular 

pattern of areaolar ducts. One is enlarged on left breast.                 

 

                    Full Exam - General                   Chest/Breast          

         

breast/chest inspection                   Chest shape:                   a 

normal exam                   2012                   None                

 

                    Full Exam - General                   Respiratory           

        auscultation

                          Overall:                   breath sounds clear bilater

ally   

                          2012                   None                

 

                    Full Exam - General                   Respiratory           

        respiratory 

effort/rhythm                   Overall:                   no retractions       

                          2012                   None                

 

                    Full Exam - General                   Respiratory           

        respiratory 

effort/rhythm                   Overall:                   normal rate          

                          2012                   None                

 

                    Full Exam - General                   Cardiovascular        

           

auscultation of heart                   Overall:                   regular rate 

                          2012                   None                

 

                    Full Exam - General                   Cardiovascular        

           

auscultation of heart                   Overall:                   regular rate 

                          2012                   None                

 

                    Full Exam - General                   Cardiovascular        

           

auscultation of heart                   Overall:                   normal heart 

sounds                    2012                   None                

 

                    Full Exam - General                   Lymphatic             

      neck nodes    

                          Overall:                   anterior cervical chain jamshid

ign        

                          2012                   None                

 

                    Full Exam - General                   Lymphatic             

      neck nodes    

                          Overall:                   posterior cervical chain be

nign       

                          2012                   None                

 

                    Full Exam - General                   Psychiatric           

        

orientation/consciousness                   Overall:                   oriented 

to person, place and time                   2012                   None   

             

 

                    Full Exam - General                   Constitutional        

            general 

appearance                   Overall:                   well nourished          

                          2012                   None                

 

                    Full Exam - General                   Constitutional        

            general 

appearance                   Overall:                   well developed          

                          2012                   None                

 

                    Full Exam - General                   Constitutional        

            general 

appearance                   Overall:                   in no acute distress    

                          2012                   None                

 

                    Full Exam - General                   Ears/Nose/Throat      

             

otoscopic exam                   Overall:                   external auditory 

canals clear                   2012                   None                

 

                    Full Exam - General                   Ears/Nose/Throat      

             

otoscopic exam                   Overall:                   tympanic membranes 

clear                     2012                   None                

 

                    Full Exam - General                   Ears/Nose/Throat      

             

lips/teeth/gingiva                   Overall:                   benign lips     

                          2012                   None                

 

                    Full Exam - General                   Ears/Nose/Throat      

             

lips/teeth/gingiva                   Overall:                   normal dentition

                          2012                   None                

 

                    Full Exam - General                   Ears/Nose/Throat      

             

lips/teeth/gingiva                   Overall:                   benign gingiva  

                          2012                   None                

 

                    Full Exam - General                   Ears/Nose/Throat      

             oral 

cavity/pharynx/larynx                    Oropharynx:                   erythema 

                          2012                   mild                

 

                    Full Exam - General                   Respiratory           

        auscultation

                          Overall:                   breath sounds clear bilater

ally   

                          2012                   None                

 

                    Full Exam - General                   Respiratory           

        respiratory 

effort/rhythm                   Overall:                   no retractions       

                          2012                   None                

 

                    Full Exam - General                   Respiratory           

        respiratory 

effort/rhythm                   Overall:                   normal rate          

                          2012                   occassional cough        

        

 

                    Full Exam - General                   Constitutional        

            general 

appearance                   Overall:                   in no acute distress    

                          2011                   None                

 

                    Full Exam - General                   Constitutional        

            general 

appearance                   Overall:                   well developed          

                          2011                   None                

 

                    Full Exam - General                   Constitutional        

            general 

appearance                   Overall:                   well nourished          

                          2011                   None                

 

                    Full Exam - General                   Ears/Nose/Throat      

             

otoscopic exam                   Overall:                   external auditory 

canals clear                   2011                   None                

 

                    Full Exam - General                   Ears/Nose/Throat      

             

otoscopic exam                   Overall:                   tympanic membranes 

clear                     2011                   None                

 

                    Full Exam - General                   Ears/Nose/Throat      

             

internal nose                   Turbinates:                   hypertrophy       

                          2011                   None                

 

                    Full Exam - General                   Ears/Nose/Throat      

             

internal nose                   Turbinates:                   erythema          

                          2011                   None                

 

                    Full Exam - General                   Ears/Nose/Throat      

             oral 

cavity/pharynx/larynx                    Oropharynx:                   erythema 

                          2011                   None                

 

                    Full Exam - General                   Neck                  

 inspection of neck 

                    Overall:                   normal size                   

2011                              None                

 

                    Full Exam - General                   Neck                  

 inspection of neck 

                    Overall:                   no masses                   

2011                              None                

 

                    Full Exam - General                   Respiratory           

        auscultation

                          Right upper lung field:                   a normal exa

m      

                          2011                   None                

 

                    Full Exam - General                   Respiratory           

        auscultation

                          Right middle lung field:                   a normal ex

am     

                          2011                   None                

 

                    Full Exam - General                   Respiratory           

        auscultation

                          Right lower lung field:                   a normal exa

m      

                          2011                   None                

 

                    Full Exam - General                   Respiratory           

        auscultation

                          Left lower lung field:                   a normal exam

       

                          2011                   None                

 

                    Full Exam - General                   Respiratory           

        auscultation

                          Overall:                   breath sounds clear bilater

ally   

                          2011                   None                

 

                    Full Exam - General                   Respiratory           

        auscultation

                          Left upper lung field:                   a normal exam

       

                          2011                   None                

 

                    Full Exam - General                   Respiratory           

        auscultation

                    Diffuse:                   a normal exam                   

2011                              None                

 

                    Full Exam - General                   Cardiovascular        

           

auscultation of heart                   Overall:                   no murmurs   

                          2011                   None                

 

                    Full Exam - General                   Cardiovascular        

           

auscultation of heart                   Overall:                   regular rate 

                          2011                   None                

 

                    Full Exam - General                   Cardiovascular        

           

auscultation of heart                   Overall:                   normal heart 

sounds                    2011                   None                

 

                    Full Exam - General                   Cardiovascular        

           

auscultation of heart                   S1:                       a normal exam 

    

                          2011                   None                

 

                    Full Exam - General                   Cardiovascular        

           

auscultation of heart                   S2:                       a normal exam 

    

                          2011                   None                

 

                    Full Exam - General                   Cardiovascular        

           

auscultation of heart                   Rhythm:                   regular rhythm

                          2011                   None                

 

                    Full Exam - General                   Cardiovascular        

           

auscultation of heart                   Rate:                     regular rate  

  

                          2011                   None                

 

                    Full Exam - General                   Cardiovascular        

           

auscultation of heart                   S3 (ventricular gallop):                

                    present                   2011                   None 

               

 

                    Full Exam - General                   Neurologic            

       mental status

                    Overall:                   alert                   

1

                                        None                

 

                    Full Exam - General                   Neurologic            

       mental status

                    Overall:                   oriented                   

2011                              None                

 

                    Full Exam - General                   Psychiatric           

        mood and 

affect                    Overall:                   normal mood and affect     

 

                          2011                   None                

 

                    Full Exam - General                   Constitutional        

            general 

appearance                   Overall:                   in no acute distress    

                          11/10/2011                   None                

 

                    Full Exam - General                   Ears/Nose/Throat      

             

otoscopic exam                   Overall:                   external auditory 

canals clear                   11/10/2011                   None                

 

                    Full Exam - General                   Ears/Nose/Throat      

             

otoscopic exam                   Overall:                   tympanic membranes 

clear                     11/10/2011                   None                

 

                    Full Exam - General                   Ears/Nose/Throat      

             

lips/teeth/gingiva                   Overall:                   benign lips     

                          11/10/2011                   None                

 

                    Full Exam - General                   Ears/Nose/Throat      

             

lips/teeth/gingiva                   Overall:                   normal dentition

                          11/10/2011                   None                

 

                    Full Exam - General                   Ears/Nose/Throat      

             

lips/teeth/gingiva                   Overall:                   benign gingiva  

                          11/10/2011                   None                

 

                    Full Exam - General                   Ears/Nose/Throat      

             oral 

cavity/pharynx/larynx                    Oropharynx:                   erythema 

                          11/10/2011                   mild                

 

                    Full Exam - General                   Respiratory           

        auscultation

                          Overall:                   breath sounds clear bilater

ally   

                          11/10/2011                   None                

 

                    Full Exam - General                   Respiratory           

        respiratory 

effort/rhythm                   Overall:                   no retractions       

                          11/10/2011                   None                

 

                    Full Exam - General                   Respiratory           

        respiratory 

effort/rhythm                   Overall:                   normal rate          

                          11/10/2011                   None                

 

                    Full Exam - General                   Cardiovascular        

           

auscultation of heart                   Overall:                   regular rate 

                          11/10/2011                   None                

 

                    Full Exam - General                   Cardiovascular        

           

auscultation of heart                   Overall:                   normal heart 

sounds                    11/10/2011                   None                

 

                    Full Exam - General                   Lymphatic             

      neck nodes    

                          Left anterior cervical chain:                   number

 of 

palpable nodes: 1                   11/10/2011                   None           

     

 

                    Full Exam - General                   Lymphatic             

      neck nodes    

                          Left anterior cervical chain:                   size (

cm): 1     

                          11/10/2011                   None                

 

                    Full Exam - General                   Chest/Breast          

         breast and 

axillae palpation                       Left upper outer quadrant:              

    

                    tender                   11/10/2011                   None  

              

 

                    Full Exam - General                   Chest/Breast          

         breast and 

axillae palpation                       Left upper outer quadrant:              

    

                    nodular                   11/10/2011                   None 

               

 

                    Full Exam - General                   Constitutional        

            general 

appearance                   Overall:                   well nourished          

                          11/10/2011                   None                

 

                    Full Exam - General                   Constitutional        

            general 

appearance                   Overall:                   well developed          

                          11/10/2011                   None                

 

                    Full Exam - General                   Lymphatic             

      neck nodes    

                          Left anterior cervical chain:                   tender

           

                          11/10/2011                   None                

 

                    Full Exam - General                   Lymphatic             

      neck nodes    

                          Left anterior cervical chain:                   mobile

           

                          11/10/2011                   None                

 

                    Full Exam - General                   Lymphatic             

      neck nodes    

                          Right anterior cervical chain:                   numbe

r of 

palpable nodes: 1                   11/10/2011                   None           

     

 

                    Full Exam - General                   Lymphatic             

      neck nodes    

                          Right anterior cervical chain:                   size 

(cm): 1    

                          11/10/2011                   None                

 

                    Full Exam - General                   Lymphatic             

      neck nodes    

                          Right anterior cervical chain:                   tende

r          

                          11/10/2011                   1                

 

                    Full Exam - General                   Lymphatic             

      neck nodes    

                          Right anterior cervical chain:                   mobil

e          

                          11/10/2011                   None                

 

                    Full Exam - General                   Constitutional        

            general 

appearance                   Overall:                   well nourished          

                          2011                   None                

 

                    Full Exam - General                   Constitutional        

            general 

appearance                   Overall:                   well developed          

                          2011                   None                

 

                    Full Exam - General                   Constitutional        

            general 

appearance                   Overall:                   in no acute distress    

                          2011                   None                

 

                    Full Exam - General                   Eyes                  

 conjunctiva/eyelids

                          Overall:                   conjunctiva clear          

       

                          2011                   None                

 

                    Full Exam - General                   Eyes                  

 conjunctiva/eyelids

                    Overall:                   cornea clear                   

2011                              None                

 

                    Full Exam - General                   Eyes                  

 conjunctiva/eyelids

                    Overall:                   eyelids normal                   

2011                              None                

 

                    Full Exam - General                   Eyes                  

 pupils and irises  

                          Overall:                   pupils equal, round, reacti

ve to 

light and accomodation                   2011                   None      

          

 

                    Full Exam - General                   Ears/Nose/Throat      

             

external ear                   Overall:                   normal appearance     

                          2011                   None                

 

                    Full Exam - General                   Ears/Nose/Throat      

             

otoscopic exam                   Overall:                   external auditory 

canals clear                   2011                   None                

 

                    Full Exam - General                   Ears/Nose/Throat      

             

otoscopic exam                   Overall:                   tympanic membranes 

clear                     2011                   None                

 

                    Full Exam - General                   Ears/Nose/Throat      

             

lips/teeth/gingiva                   Overall:                   benign lips     

                          2011                   None                

 

                    Full Exam - General                   Ears/Nose/Throat      

             

lips/teeth/gingiva                   Overall:                   normal dentition

                          2011                   None                

 

                    Full Exam - General                   Ears/Nose/Throat      

             

lips/teeth/gingiva                   Overall:                   benign gingiva  

                          2011                   None                

 

                    Full Exam - General                   Ears/Nose/Throat      

             oral 

cavity/pharynx/larynx                    Overall:                   oral mucosa 

clear                     2011                   None                

 

                    Full Exam - General                   Ears/Nose/Throat      

             oral 

cavity/pharynx/larynx                    Overall:                   tonsils 

benign                    2011                   None                

 

                    Full Exam - General                   Ears/Nose/Throat      

             oral 

cavity/pharynx/larynx                    Overall:                   

oropharyngeal mucosa clear                   2011                   None  

              

 

                    Full Exam - General                   Neck                  

 thyroid            

                    Overall:                   normal size                   2011      

                                        None                

 

                    Full Exam - General                   Neck                  

 thyroid            

                    Overall:                   normal consistency               

    

2011                              None                

 

                    Full Exam - General                   Neck                  

 thyroid            

                    Overall:                   nontender                           

                                        None                

 

                    Full Exam - General                   Respiratory           

        auscultation

                          Overall:                   breath sounds clear bilater

ally   

                          2011                   None                

 

                    Full Exam - General                   Respiratory           

        respiratory 

effort/rhythm                   Overall:                   no retractions       

                          2011                   None                

 

                    Full Exam - General                   Respiratory           

        respiratory 

effort/rhythm                   Overall:                   normal rate          

                          2011                   None                

 

                    Full Exam - General                   Cardiovascular        

           

auscultation of heart                   Overall:                   regular rate 

                          2011                   None                

 

                    Full Exam - General                   Cardiovascular        

           

auscultation of heart                   Overall:                   normal heart 

sounds                    2011                   None                

 

                    Full Exam - General                   Chest/Breast          

         breast and 

axillae palpation                       Right upper inner quadrant:             

    

                    mass present                   2011                   

None              

  

 

                    Full Exam - General                   Chest/Breast          

         breast and 

axillae palpation                       Right upper inner quadrant:             

    

                    tender                   2011                   None  

              

 

                    Full Exam - General                   Chest/Breast          

         breast and 

axillae palpation                       Right upper inner quadrant:             

    

                    size (cm): 2                   2011                   

None              

  

 

                    Full Exam - General                   Abdomen               

    abdominal exam  

                    Overall:                   no tenderness                   

2011                              None                

 

                    Full Exam - General                   Abdomen               

    abdominal exam  

                    Overall:                   soft                   2011

   

                                        None                

 

                    Full Exam - General                   Abdomen               

    abdominal exam  

                    Overall:                   no masses                   

2011                              None                

 

                    Full Exam - General                   Abdomen               

    abdominal exam  

                          Overall:                   normal bowel sounds        

         

                          2011                   None                

 

                    Full Exam - General                   Musculoskeletal       

            left 

upper extremity                   Overall:                   normal left 

shoulder                   2011                   None                

 

                    Full Exam - General                   Musculoskeletal       

            left 

upper extremity                   Overall:                   normal left elbow  

                          2011                   None                

 

                    Full Exam - General                   Musculoskeletal       

            left 

upper extremity                   Overall:                   normal left wrist  

                          2011                   None                

 

                    Full Exam - General                   Musculoskeletal       

            left 

upper extremity                   Overall:                   full strength in 

LUE                       2011                   None                

 

                    Full Exam - General                   Musculoskeletal       

            left 

upper extremity                   Overall:                   normal LUE bulk and

 tone                     2011                   None                

 

                    Full Exam - General                   Musculoskeletal       

            right 

upper extremity                   Overall:                   right shoulder 

benign                    2011                   None                

 

                    Full Exam - General                   Musculoskeletal       

            right 

upper extremity                   Overall:                   right elbow benign 

                          2011                   None                

 

                    Full Exam - General                   Musculoskeletal       

            right 

upper extremity                   Overall:                   right wrist benign 

                          2011                   None                

 

                    Full Exam - General                   Musculoskeletal       

            right 

upper extremity                   Overall:                   normal RUE bulk and

 tone                     2011                   None                

 

                    Full Exam - General                   Musculoskeletal       

            right 

upper extremity                   Overall:                   full strength in 

RUE                       2011                   None                

 

                    Full Exam - General                   Musculoskeletal       

            right 

lower extremity                   Overall:                   right knee benign  

                          2011                   None                

 

                    Full Exam - General                   Musculoskeletal       

            right 

lower extremity                   Overall:                   right ankle benign 

                          2011                   None                

 

                    Full Exam - General                   Musculoskeletal       

            right 

lower extremity                   Overall:                   right foot benign  

                          2011                   None                

 

                    Full Exam - General                   Musculoskeletal       

            right 

lower extremity                   Overall:                   full strength in 

RLE                       2011                   None                

 

                    Full Exam - General                   Musculoskeletal       

            right 

lower extremity                   Overall:                   normal RLE bulk and

 tone                     2011                   None                

 

                    Full Exam - General                   Musculoskeletal       

            left 

lower extremity                   Overall:                   left knee benign   

                          2011                   None                

 

                    Full Exam - General                   Musculoskeletal       

            left 

lower extremity                   Overall:                   left ankle benign  

                          2011                   None                

 

                    Full Exam - General                   Musculoskeletal       

            left 

lower extremity                   Overall:                   left foot benign   

                          2011                   None                

 

                    Full Exam - General                   Musculoskeletal       

            left 

lower extremity                   Overall:                   full strength in 

LLE                       2011                   None                

 

                    Full Exam - General                   Musculoskeletal       

            left 

lower extremity                   Overall:                   normal LLE bulk and

 tone                     2011                   None                

 

                    Full Exam - General                   Musculoskeletal       

            spine, 

ribs and pelvis                   Overall:                   good posture       

                          2011                   None                

 

                    Full Exam - General                   Musculoskeletal       

            spine, 

ribs and pelvis                   Overall:                   spine benign       

                          2011                   None                

 

                    Full Exam - General                   Musculoskeletal       

            spine, 

ribs and pelvis                   Overall:                   sacroiliac joint 

benign                    2011                   None                

 

                    Full Exam - General                   Musculoskeletal       

            spine, 

ribs and pelvis                   Overall:                   right hip benign   

                          2011                   pops with external rotati

on        

        

 

                    Full Exam - General                   Musculoskeletal       

            spine, 

ribs and pelvis                   Overall:                   left hip benign    

                          2011                   pops with external rotati

on         

       

 

                    Full Exam - General                   Musculoskeletal       

            gait and

 station                   Overall:                   normal gait               

                          2011                   None                

 

                    Full Exam - General                   Musculoskeletal       

            gait and

 station                   Overall:                   normal station            

                          2011                   None                

 

                    Full Exam - General                   Integument            

       inspection of

 skin                     Overall:                   no rash, lesions           

  

                          2011                   None                

 

                    Full Exam - General                   Psychiatric           

        

orientation/consciousness                   Overall:                   oriented 

to person, place and time                   2011                   None   

             

 

                    Full Exam - General                   Constitutional        

            general 

appearance                   Overall:                   well nourished          

                          2011                   None                

 

                    Full Exam - General                   Constitutional        

            general 

appearance                   Overall:                   well developed          

                          2011                   None                

 

                    Full Exam - General                   Constitutional        

            general 

appearance                   Overall:                   in no acute distress    

                          2011                   None                

 

                    Full Exam - General                   Neurologic            

       mental status

                    Overall:                   alert                   

1

                                        None                

 

                    Full Exam - General                   Neurologic            

       mental status

                    Overall:                   oriented                   

2011                              None                

 

                    Full Exam - General                   Psychiatric           

        mood and 

affect                    Overall:                   normal mood and affect     

 

                          2011                   None                

 

                    Full Exam - General                   Abdomen               

    abdominal exam  

                    Overall:                   no masses                   

2011                              None                

 

                    Full Exam - General                   Abdomen               

    abdominal exam  

                    Overall:                   no tenderness                   

2011                              None                

 

                    Full Exam - General                   Abdomen               

    abdominal exam  

                          Overall:                   normal bowel sounds        

         

                          2011                   None                

 

                    Full Exam - General                   Abdomen               

    abdominal exam  

                    Overall:                   soft                   2011

   

                                        None                

 

                    Full Exam - General                   Constitutional        

            general 

appearance                   Overall:                   well nourished          

                          2010                   None                

 

                    Full Exam - General                   Constitutional        

            general 

appearance                   Overall:                   well developed          

                          2010                   None                

 

                    Full Exam - General                   Constitutional        

            general 

appearance                   Overall:                   in no acute distress    

                          2010                   None                

 

                    Full Exam - General                   Neurologic            

       mental status

                    Overall:                   alert                   

0

                                        None                

 

                    Full Exam - General                   Neurologic            

       mental status

                    Overall:                   oriented                   

2010                              None                

 

                    Full Exam - General                   Psychiatric           

        mood and 

affect                    Overall:                   normal mood and affect     

 

                          2010                   None                

 

                    Full Exam - General                   Respiratory           

        auscultation

                          Overall:                   breath sounds clear bilater

ally   

                          2010                   None                

 

                    Full Exam - General                   Respiratory           

        auscultation

                    Diffuse:                   a normal exam                   

2010                              None                

 

                    Full Exam - General                   Respiratory           

        auscultation

                          Left upper lung field:                   a normal exam

       

                          2010                   None                

 

                    Full Exam - General                   Respiratory           

        auscultation

                          Left lower lung field:                   a normal exam

       

                          2010                   None                

 

                    Full Exam - General                   Respiratory           

        auscultation

                          Right upper lung field:                   a normal exa

m      

                          2010                   None                

 

                    Full Exam - General                   Respiratory           

        auscultation

                          Right middle lung field:                   a normal ex

am     

                          2010                   None                

 

                    Full Exam - General                   Respiratory           

        auscultation

                          Right lower lung field:                   a normal exa

m      

                          2010                   None                

 

                    Full Exam - General                   Cardiovascular        

           

auscultation of heart                   Overall:                   regular rate 

                          2010                   None                

 

                    Full Exam - General                   Cardiovascular        

           

auscultation of heart                   Overall:                   normal heart 

sounds                    2010                   None                

 

                    Full Exam - General                   Cardiovascular        

           

auscultation of heart                   Overall:                   no murmurs   

                          2010                   None                

 

                    Full Exam - General                   Cardiovascular        

           

auscultation of heart                   Rate:                     regular rate  

  

                          2010                   None                

 

                    Full Exam - General                   Cardiovascular        

           

auscultation of heart                   Rhythm:                   regular rhythm

                          2010                   None                

 

                    Full Exam - General                   Cardiovascular        

           

auscultation of heart                   S1:                       a normal exam 

    

                          2010                   None                

 

                    Full Exam - General                   Cardiovascular        

           

auscultation of heart                   S2:                       a normal exam 

    

                          2010                   None                

 

                    Full Exam - General                   Abdomen               

    abdominal exam  

                    Overall:                   no masses                   

2010                              None                

 

                    Full Exam - General                   Abdomen               

    abdominal exam  

                          Overall:                   normal bowel sounds        

         

                          2010                   None                

 

                    Full Exam - General                   Abdomen               

    abdominal exam  

                    Overall:                   soft                   2010

   

                                        None                

 

                    Full Exam - General                   Abdomen               

    abdominal exam  

                          Epigastric:                   tender to palpation     

         

                          2010                   None                

 

                    Full Exam - General                   Abdomen               

    abdominal exam  

                          Left upper quadrant:                   tender to palpa

tion     

                          2010                   None                

 

                    Full Exam - General                   Musculoskeletal       

            left 

lower extremity                   Inspection - left foot:                   

redness                   2010                   5th toe-previously 

diagnosed slightly displaced fracture. (5th digit)                

 

                    Full Exam - General                   Musculoskeletal       

            left 

lower extremity                   Palpation - left foot:                   

swelling                   2010                   None                

 

                    Full Exam - General                   Musculoskeletal       

            left 

lower extremity                   Palpation - left foot:                   

tender                    2010                   None                

 

                    Full Exam - General                   Musculoskeletal       

            left 

lower extremity                   ROM - left foot:                   pain with 

ROM                       2010                   None                

 

                    Full Exam - General                   Psychiatric           

        

orientation/consciousness                   Overall:                   oriented 

to person, place and time                   2010                   None   

             

 

                    Full Exam - General                   Constitutional        

            general 

appearance                   Overall:                   well nourished          

                          2010                   None                

 

                    Full Exam - General                   Constitutional        

            general 

appearance                   Overall:                   well developed          

                          2010                   None                

 

                    Full Exam - General                   Constitutional        

            general 

appearance                   Overall:                   in no acute distress    

                          2010                   None                

 

                    Full Exam - General                   Respiratory           

        auscultation

                          Overall:                   breath sounds clear bilater

ally   

                          2010                   None                

 

                    Full Exam - General                   Respiratory           

        respiratory 

effort/rhythm                   Overall:                   no retractions       

                          2010                   None                

 

                    Full Exam - General                   Respiratory           

        respiratory 

effort/rhythm                   Overall:                   normal rate          

                          2010                   None                

 

                    Full Exam - General                   Cardiovascular        

           

auscultation of heart                   Overall:                   regular rate 

                          2010                   None                

 

                    Full Exam - General                   Cardiovascular        

           

auscultation of heart                   Overall:                   normal heart 

sounds                    2010                   None                

 

                    Full Exam - General                   Musculoskeletal       

            left 

lower extremity                   Overall:                   left ankle benign  

                          2010                   None                

 

                    Full Exam - General                   Musculoskeletal       

            left 

lower extremity                   Inspection - left foot:                   

swelling                   2010                   None                

 

                    Full Exam - General                   Constitutional        

            general 

appearance                   Overall:                   well nourished          

                          2010                   None                

 

                    Full Exam - General                   Constitutional        

            general 

appearance                   Overall:                   well developed          

                          2010                   None                

 

                    Full Exam - General                   Constitutional        

            general 

appearance                   Overall:                   in no acute distress    

                          2010                   None                

 

                    Full Exam - General                   Eyes                  

 conjunctiva/eyelids

                          Overall:                   conjunctiva clear          

       

                          2010                   None                

 

                    Full Exam - General                   Eyes                  

 conjunctiva/eyelids

                    Overall:                   cornea clear                   

2010                              None                

 

                    Full Exam - General                   Eyes                  

 conjunctiva/eyelids

                    Overall:                   eyelids normal                   

2010                              None                

 

                    Full Exam - General                   Eyes                  

 pupils and irises  

                          Overall:                   pupils equal, round, reacti

ve to 

light and accomodation                   2010                   None      

          

 

                    Full Exam - General                   Ears/Nose/Throat      

             

otoscopic exam                   Overall:                   external auditory 

canals clear                   2010                   None                

 

                    Full Exam - General                   Ears/Nose/Throat      

             

otoscopic exam                   Overall:                   tympanic membranes 

clear                     2010                   None                

 

                    Full Exam - General                   Ears/Nose/Throat      

             

internal nose                   Overall:                   bilateral nasal 

cavities clear                   2010                   None              

  

 

                    Full Exam - General                   Ears/Nose/Throat      

             oral 

cavity/pharynx/larynx                    Oral mucosa:                   a normal

 exam                     2010                   None                

 

                    Full Exam - General                   Ears/Nose/Throat      

             oral 

cavity/pharynx/larynx                    Oropharynx:                   a normal 

exam                      2010                   None                

 

                    Full Exam - General                   Neck                  

 inspection of neck 

                    Overall:                   normal size                   

2010                              None                

 

                    Full Exam - General                   Neck                  

 inspection of neck 

                    Overall:                   no masses                   

2010                              None                

 

                    Full Exam - General                   Respiratory           

        auscultation

                          Overall:                   breath sounds clear bilater

ally   

                          2010                   None                

 

                    Full Exam - General                   Respiratory           

        auscultation

                    Diffuse:                   a normal exam                   

2010                              None                

 

                    Full Exam - General                   Respiratory           

        auscultation

                          Left upper lung field:                   a normal exam

       

                          2010                   None                

 

                    Full Exam - General                   Respiratory           

        auscultation

                          Left lower lung field:                   a normal exam

       

                          2010                   None                

 

                    Full Exam - General                   Respiratory           

        auscultation

                          Right upper lung field:                   a normal exa

m      

                          2010                   None                

 

                    Full Exam - General                   Respiratory           

        auscultation

                          Right middle lung field:                   a normal ex

am     

                          2010                   None                

 

                    Full Exam - General                   Respiratory           

        auscultation

                          Right lower lung field:                   a normal exa

m      

                          2010                   None                

 

                    Full Exam - General                   Cardiovascular        

           

auscultation of heart                   Overall:                   regular rate 

                          2010                   None                

 

                    Full Exam - General                   Cardiovascular        

           

auscultation of heart                   Overall:                   normal heart 

sounds                    2010                   None                

 

                    Full Exam - General                   Cardiovascular        

           

auscultation of heart                   Overall:                   no murmurs   

                          2010                   None                

 

                    Full Exam - General                   Cardiovascular        

           

auscultation of heart                   Rate:                     regular rate  

  

                          2010                   None                

 

                    Full Exam - General                   Cardiovascular        

           

auscultation of heart                   Rhythm:                   regular rhythm

                          2010                   None                

 

                    Full Exam - General                   Cardiovascular        

           

auscultation of heart                   S1:                       a normal exam 

    

                          2010                   None                

 

                    Full Exam - General                   Cardiovascular        

           

auscultation of heart                   S2:                       a normal exam 

    

                          2010                   None                

 

                    Full Exam - General                   Cardiovascular        

           

extremities                   Overall:                   no clubbing            

                          2010                   None                

 

                    Full Exam - General                   Cardiovascular        

           

extremities                   Overall:                   No edema               

                          2010                   None                

 

                    Full Exam - General                   Cardiovascular        

           

extremities                   Overall:                   No cyanosis            

                          2010                   None                

 

                    Full Exam - General                   Abdomen               

    abdominal exam  

                    Overall:                   no masses                   

2010                              None                

 

                    Full Exam - General                   Abdomen               

    abdominal exam  

                    Overall:                   no tenderness                   

2010                              None                

 

                    Full Exam - General                   Abdomen               

    abdominal exam  

                          Overall:                   normal bowel sounds        

         

                          2010                   None                

 

                    Full Exam - General                   Abdomen               

    abdominal exam  

                    Overall:                   soft                   2010

   

                                        None                

 

                    Full Exam - General                   Musculoskeletal       

            spine, 

ribs and pelvis                   Spine:                    a normal exam       

 

                          2010                   None                

 

                    Full Exam - General                   Musculoskeletal       

            gait and

 station                   Gait:                   symmetric                   

2010                              None                

 

                    Full Exam - General                   Integument            

       inspection of

 skin                     Overall:                   no rash, lesions           

  

                          2010                   None                

 

                    Full Exam - General                   Neurologic            

       deep tendon 

reflexes                   Overall:                   deep tendon reflexes 

intact                    2010                   None                

 

                    Full Exam - General                   Neurologic            

       mental status

                    Overall:                   alert                   

0

                                        None                

 

                    Full Exam - General                   Neurologic            

       mental status

                    Overall:                   oriented                   

2010                              None                

 

                    Full Exam - General                   Neurologic            

       gait         

                          Overall:                   no ataxia, no unsteadiness 

                

                          2010                   None                

 

                    Full Exam - General                   Neurologic            

       cranial 

nerves                    Overall:                   cranial nerves 1-12 intact 

 

                          2010                   None                

 

                    Full Exam - General                   Psychiatric           

        mood and 

affect                    Overall:                   normal mood and affect     

 

                          2010                   None                

 

                    Full Exam - General                   Integument            

       inspection of

 skin                     Rash/Lesions:                   pustule               

  

                          2010                   None                

 

                    Full Exam - General                   Integument            

       inspection of

 skin                     Rash/Lesions:                   blackheads            

  

                          2010                   None                

 

                    Full Exam - General                   Constitutional        

            general 

appearance                   Overall:                   well nourished          

                          2010                   None                

 

                    Full Exam - General                   Constitutional        

            general 

appearance                   Overall:                   well developed          

                          2010                   None                

 

                    Full Exam - General                   Constitutional        

            general 

appearance                   Overall:                   in no acute distress    

                          2010                   None                

 

                    Full Exam - General                   Integument            

       inspection of

 skin                   Location:                   face                   

2010                              None                

 

                    Full Exam - General                   Integument            

       inspection of

 skin                   Location:                   back                   

2010                              None                

 

                    Full Exam - General                   Integument            

       inspection of

 skin                     Rash/Lesions:                   papule                

  

                          2010                   None                



                                                                    



Instructions

                      



                                        Comment                

 

                                                            . Refil on [employer] cre

am for skin acne. Azithromycin. 

Discussed fluids and comfort care. Note for school. Parents want refil for "skin
 medicines".                                   

 

                                                            . Ortho consult if p

ain worsens or does not improve. RX for 

aircast. Father has borrowed an aircast from a friend and the hard soled shoe 
that the pt. was given at the ER has caused discomfort on the top of the foot 
and the heel from friction.                       

Pt will continue RICE and Ibuprofen regimen. Discussed avoidance of high impact 
activities.                                                      

 

                                                            . Trial of Nexium 40

mg po daily                      

                                                      

 

                                                            . Use Purpose Soap  

                    

Trial of Selsun Blue alternating w/ Ketoconazole                                
                      

 

                                                            . states Minocin not

 helping acne any more. Discussed that 

nipple hair and circular bumps on areola are normal. Mupirocin to one area that 
is slightly enlarged/irritated. Discussed that sleeping without bra on may 
decrease irritation.                                  

 

                                                            . will schedule for 

ultrasound of rt. breast. Mother and pt.

 will monitor for signs of infection and notify us if discharge, redness or 
warmth occur.                                   

 

                                                            . New toothebrush in

 5 days                                 

 

 

                                                            . Continue Nexium fo

r 2 more weeks then DC--if symptoms 

return will notify us                                  

 

                                                            . order for lab draw

: CBC, CMP, HCG and sed rate. Will take 

imodium and notify if symptoms persist. Will consider ultrasound or mammo if 
breast issue continues. Antibiotic likely if diarrhea resolves but cough and 
other symptoms persist.                                   

 

                                                            . [facility] form filled ou

t                                  



                                                                    



Additional Source Comments

          This clinical document has been generated using [employer] 
software that has been certified by the Office of the National Coordinator for 
Health Information Technology (ONC 15.99.04.3023.Diam.31.00.0.661979) and the 
National Committee for  (NCQA, as an eMeasure certified 
technology).            

            

FOR RECORDS PERTAINING TO PATIENTS WHO ARE OR HAVE BEEN ENROLLED IN A CHEMICAL D
EPENDENCY/SUBSTANCE ABUSE PROGRAM, SOME INFORMATION MAY BE OMITTED. This clinica
l summary was aggregated from multiple sources.  Caution should be exercised in 
using it in the provision of clinical care. This summary normalizes information 
from multiple sources, and as a consequence, information in this document may ma
terially change the coding, format and clinical context of patient data. In chencho
tion, data may be omitted in some cases. CLINICAL DECISIONS SHOULD BE BASED ON T
HE PRIMARY CLINICAL RECORDS. [employer] provides no warranty or guara
ntee of the accuracy or completeness of information in this document.The followi
ng information is based on time limited clinical information            



                                        UNRECOGNIZED CONTENT PROVIDED BELOW FOR 

UNRECOGNIZED SECTION REASON FOR VISIT   

             

 

                                                         



Hospital f/u for possible miscarriage---dgrigoberto, [facility] callLab (walk-in) Ranjan GARZON Notification/Follow upmed questionOB f/u. Was seen at [facility] ED for SAB  Concerned about weightloss, would like something to help gain, Would like
 to restart BC, has used [facility] Sacha MENON

## 2020-07-16 NOTE — HISTORY & PHYSICAL
History and Physical


Date Seen by Provider:  2020


Time Seen by Provider:  12:02


This patient is a 24-year-old para one  1 white female with an putting 

her at 37-1/7 weeks gestation she presents from clinic in early labor.  She is 

admitted now for repeat .  Her GBS culture done after 35 weeks negative.

 She denies rupture right has had some spotting and bleeding.  





Allergies are to kiwi fruit


Medications are prenatal vitamins





Medical social and surgical histories are per the antepartum record





HEENT exam is normal


Neck is supple no lymphadenopathy no thyromegaly


Abdomen is gravid soft nontender nondistended


Extremities show no clubbing cyanosis.  There is no Homans sign.  


Pelvic exam is deferred





Assessment and plan   37 weeks gestation with previous  and in early 

labor.  Patient is admitted now for repeat  delivery


37 weeks in labor admitted for repeat 





Allergies and Home Medications


Allergies


Coded Allergies:  


     kiwi (Unverified  Allergy, Unknown, 14)





Home Medications


Cephalexin 500 Mg Capsule, 500 MG PO TID


   Prescribed by: PILO POLLARD on 20 [id number]





Patient Home Medication List


Home Medication List Reviewed:  Yes





Clinical Quality Measures


DVT/VTE Risk/Contraindication:


Risk Factor Score Per Nursin


RFS Level Per Nursing on Admit:  2=Moderate











NORMAN NEAL MD       2020 12:04

## 2020-07-16 NOTE — NUR
ALEJANDRINA SCHAEFFER presented to unit via AMBULATORY from OFFICE, accompanied by S/O FOR REPEAT 
. ALEJANDRINA SCHAEFFER weighed, gowned, voided, and to bed.  EFHM and TOCO applied, VS 
taken.  ALEJANDRINA SCHAEFFER oriented to bed controls, call light, TV, heat, and A/C controls.

## 2020-07-16 NOTE — OPERATIVE REPORT
DATE OF SERVICE:  2020



PREOPERATIVE DIAGNOSIS:

A 37 weeks' pregnancy with previous  section, in labor.



POSTOPERATIVE DIAGNOSES:

A 37 weeks' pregnancy with previous  section, in labor with right

paratubal cystic mass.



OPERATIVE PROCEDURE:

Repeat  delivery and resection of right paratubal cystic mass.



OPERATIVE DESCRIPTION:

With the patient in supine position under satisfactory spinal analgesia, she was

prepped and draped in the usual fashion for abdominal surgery.  Elena catheter

was placed in the urinary bladder.



A repeat Pfannenstiel incision made through the skin with a scalpel was placed

and the abdomen was entered in the usual manner.  Bladder retractor was placed

in position and a clean scalpel was used to make a 4 cm hysterotomy incision

transversely across the lower uterine segment that was extended by blunt

dissection as well.  A small amount of clear amniotic fluid was released on

hysterotomy.  The incision was extended bluntly and then a vigorous viable

female infant was delivered via the uterine incision.  Infant had Apgars of 8

and 8 at 1 and 5 minutes respectively.  Birth time of 12:32, cord blood pH of

7.25 and weight that is pending at this time.



The infant was bulb suctioned on delivery of the head, again on completion of

delivery.  The umbilical cord was doubly clamped and cut and the infant passed

to the pediatric nurse in attendance for delivery.



Cord bloods were obtained.  Placenta delivered spontaneously Gardner.  There was

a large quantity of blood released when the placenta was removed.  The uterus

was removed from the abdominal cavity.  All blood clot and debris removed from

inside of the uterus with laparotomy sponges.  The uterus began to contract

somewhat at that point.  The uterine incision was closed with a running locked

suture of 2-0 Vicryl.  The uterus was still somewhat atonic, a modified B Chester 
suture was

placed using 2-0 chromic sutures in the usual manner.  This compressed the

uterus nicely, stemming the blood flow.  The uterus was now returned to the

abdominal cavity and all blood clot and debris removed from the abdominal

cavity.



Sponge and needle counts were correct.  Hemostasis was assured.  The anterior

parietal peritoneum was closed with a running suture of 2-0 Vicryl.  Rectus

muscles were closed with that suture as well.  The rectus fascia was closed with

2-0 Vicryl.  Subcutaneous tissue was closed with 2-0 Vicryl and the skin with

staple.



Sponge and needle counts were correct on completion of the procedure.  Estimated

blood loss was around 500 mL.  The patient tolerated the procedure well and was

transferred to the recovery room in stable condition.  The infant had been taken

stable to the full-term nursery.





Job ID: [id number]

DocumentID: [id number]

Dictated Date:  2020 12:58:32

Transcription Date: 2020 14:20:03

Dictated By: NORMAN NEAL MD

MTDD

## 2020-07-17 VITALS — DIASTOLIC BLOOD PRESSURE: 82 MMHG | SYSTOLIC BLOOD PRESSURE: 125 MMHG

## 2020-07-17 VITALS — SYSTOLIC BLOOD PRESSURE: 114 MMHG | DIASTOLIC BLOOD PRESSURE: 76 MMHG

## 2020-07-17 VITALS — DIASTOLIC BLOOD PRESSURE: 81 MMHG | SYSTOLIC BLOOD PRESSURE: 123 MMHG

## 2020-07-17 VITALS — DIASTOLIC BLOOD PRESSURE: 77 MMHG | SYSTOLIC BLOOD PRESSURE: 115 MMHG

## 2020-07-17 VITALS — DIASTOLIC BLOOD PRESSURE: 70 MMHG | SYSTOLIC BLOOD PRESSURE: 111 MMHG

## 2020-07-17 RX ADMIN — OXYCODONE HYDROCHLORIDE AND ACETAMINOPHEN PRN TAB: 10; 325 TABLET ORAL at 21:16

## 2020-07-17 RX ADMIN — OXYCODONE HYDROCHLORIDE AND ACETAMINOPHEN PRN TAB: 10; 325 TABLET ORAL at 10:19

## 2020-07-17 RX ADMIN — DOCUSATE SODIUM SCH MG: 100 CAPSULE ORAL at 21:17

## 2020-07-17 RX ADMIN — IBUPROFEN SCH MG: 800 TABLET, FILM COATED ORAL at 21:17

## 2020-07-17 RX ADMIN — DOCUSATE SODIUM SCH MG: 100 CAPSULE ORAL at 10:17

## 2020-07-17 RX ADMIN — KETOROLAC TROMETHAMINE SCH MG: 30 INJECTION, SOLUTION INTRAMUSCULAR; INTRAVENOUS at 02:52

## 2020-07-17 RX ADMIN — IBUPROFEN SCH MG: 800 TABLET, FILM COATED ORAL at 15:38

## 2020-07-17 RX ADMIN — IBUPROFEN SCH MG: 800 TABLET, FILM COATED ORAL at 10:17

## 2020-07-17 RX ADMIN — OXYCODONE HYDROCHLORIDE AND ACETAMINOPHEN PRN TAB: 10; 325 TABLET ORAL at 02:53

## 2020-07-17 RX ADMIN — OXYCODONE HYDROCHLORIDE AND ACETAMINOPHEN PRN TAB: 10; 325 TABLET ORAL at 15:39

## 2020-07-17 NOTE — NUR
To room for meds and assessment. Pt sleeping soundly. Will not wake at this time. S.O. will 
call when pt awakes.

## 2020-07-17 NOTE — NUR
To room to answer call light. Pt states she thinks one of her staples is coming out after 
showering. Last staple on L side loose, appears just not to have been put deep into tissue. 
Bandaid applied over staple to prevent catching on clothing

## 2020-07-17 NOTE — PROGRESS NOTE
Standard Progress Note


Progress Notes/Assess & Plan


Date Seen by a Provider:  2020


Time Seen by a Provider:  07:36


Progress/Assessment & Plan


This patient is without complaint.  She is ambulating, voiding, tolerating oral 

intake well has good pain control.








Vital Signs








  Date Time  Temp Pulse Resp B/P (MAP) Pulse Ox O2 Delivery O2 Flow Rate FiO2


 


20 03:14 36.0 88 18 125/82 (96) 100 Room Air  


 


20 00:00 36.0 78 18 115/77 (90) 99 Room Air  


 


20 21:02 36.0 107 18 129/76 (93)  Room Air  


 


20 18:50  95 18 117/64 (81)  Room Air  


 


20 18:00 36.0 103 18 110/64 (79) 100 Room Air  


 


20 15:11      Room Air  


 


20 14:13 35.8 100 18 115/71 (86) 100 Room Air  


 


20 13:58 35.8  12 105/66 (79) 100 Room Air  


 


20 13:45 36.0  12 105/71 (82) 100 Room Air  


 


20 13:30 35.7  16 102/71 (81) 100 Room Air  


 


20 13:15 35.7  16 105/70 (82) 100 Room Air  


 


20 13:00 36.2  16 96/60 (72) 100 Room Air  


 


20 08:55 36.2 100 18  100 Room Air  














I & O 


 


 20





 07:00


 


Intake Total 1750 ml


 


Output Total 2000 ml


 


Balance -250 ml





Vital signs are stable.  Patient is afebrile.





Abdomen is benign.  The surgical incision is clean dry and intact.


Extremities show no clubbing or cyanosis.  There is no Homans sign.





Assessment and plan   postoperative day number 1 doing well.  Plan is for 

routine convalescence care


Final Diagnosis


37 week repeat  delivery











NORMAN NEAL MD       2020 07:37

## 2020-07-17 NOTE — ANESTHESIA-REGIONAL POST-OP
Regional


Patient Condition


Mental Status:  Alert, Oriented x3


Circulation:  Same as Pre-Op


Headache:  Absent


Sensation:  Full Recovery


Motor Block:  Absent





Post Op Complications


Complications


None





Follow Up Care/Instructions


Patient Instructions


None needed.





Anesthesia/Patient Condition


Patient is doing well, no complaints, stable vital signs, no apparent adverse 

anesthesia problems.











BEN ZHOU DO         Jul 17, 2020 15:51

## 2020-07-18 VITALS — SYSTOLIC BLOOD PRESSURE: 124 MMHG | DIASTOLIC BLOOD PRESSURE: 76 MMHG

## 2020-07-18 VITALS — SYSTOLIC BLOOD PRESSURE: 117 MMHG | DIASTOLIC BLOOD PRESSURE: 82 MMHG

## 2020-07-18 RX ADMIN — OXYCODONE HYDROCHLORIDE AND ACETAMINOPHEN PRN TAB: 10; 325 TABLET ORAL at 03:21

## 2020-07-18 RX ADMIN — IBUPROFEN SCH MG: 800 TABLET, FILM COATED ORAL at 09:13

## 2020-07-18 RX ADMIN — IBUPROFEN SCH MG: 800 TABLET, FILM COATED ORAL at 03:21

## 2020-07-18 RX ADMIN — OXYCODONE HYDROCHLORIDE AND ACETAMINOPHEN PRN TAB: 10; 325 TABLET ORAL at 09:12

## 2020-07-18 NOTE — PROGRESS NOTE
Standard Progress Note


Progress Notes/Assess & Plan


Date Seen by a Provider:  2020


Time Seen by a Provider:  09:30


Progress/Assessment & Plan


This patient is without complaint.  She is ambulating, voiding, tolerating oral 

intake well has good pain control.








Vital Signs








  Date Time  Temp Pulse Resp B/P (MAP) Pulse Ox O2 Delivery O2 Flow Rate FiO2


 


20 03:14 36.0 88 18 125/82 (96) 100 Room Air  


 


20 00:00 36.0 78 18 115/77 (90) 99 Room Air  


 


20 21:02 36.0 107 18 129/76 (93)  Room Air  


 


20 18:50  95 18 117/64 (81)  Room Air  


 


20 18:00 36.0 103 18 110/64 (79) 100 Room Air  


 


20 15:11      Room Air  


 


20 14:13 35.8 100 18 115/71 (86) 100 Room Air  


 


20 13:58 35.8  12 105/66 (79) 100 Room Air  


 


20 13:45 36.0  12 105/71 (82) 100 Room Air  


 


20 13:30 35.7  16 102/71 (81) 100 Room Air  


 


20 13:15 35.7  16 105/70 (82) 100 Room Air  


 


20 13:00 36.2  16 96/60 (72) 100 Room Air  


 


20 08:55 36.2 100 18  100 Room Air  














I & O 


 


 20





 07:00


 


Intake Total 1750 ml


 


Output Total 2000 ml


 


Balance -250 ml





Vital signs are stable.  Patient is afebrile.





Abdomen is benign.  The surgical incision is clean dry and intact.


Extremities show no clubbing or cyanosis.  There is no Homans sign.





Assessment and plan   postoperative day number 1 doing well.  Plan is for 

routine convalescence care





2020


Patient without complaint.  Ambulating, voiding, tolerating oral intake well has

good pain control.  Patient is requesting discharge home.








Vital Signs








  Date Time  Temp Pulse Resp B/P (MAP) Pulse Ox O2 Delivery O2 Flow Rate FiO2


 


20 02:30 36.1 83 18 117/82 (94)  Room Air  


 


20 20:15 36.9 83 20 114/76 (89) 99 Room Air  


 


20 15:37 35.7 96 18 111/70 (84) 98 Room Air  


 


20 10:15 35.9 94 18 123/81 (95)  Room Air  








Vital signs are stable.  Patient afebrile.





The abdomen is benign.  The surgical incision is clean dry and intact.


Extremities show no clubbing cyanosis.  There is no Homans sign.





Assessment and plan   postoperative day number 2 status post repeat  

delivery at 37 weeks gestation.  Plan is for discharge home with follow-up in 

clinic


Final Diagnosis


37 week repeat  delivery











NORMAN NEAL MD       2020 09:31

## 2021-01-03 ENCOUNTER — HOSPITAL ENCOUNTER (EMERGENCY)
Dept: HOSPITAL 75 - ER | Age: 26
LOS: 1 days | Discharge: HOME | End: 2021-01-04
Payer: MEDICAID

## 2021-01-03 VITALS — HEIGHT: 62.01 IN | WEIGHT: 119.93 LBS | BODY MASS INDEX: 21.79 KG/M2

## 2021-01-03 VITALS — SYSTOLIC BLOOD PRESSURE: 131 MMHG | DIASTOLIC BLOOD PRESSURE: 80 MMHG

## 2021-01-03 DIAGNOSIS — E86.0: ICD-10-CM

## 2021-01-03 DIAGNOSIS — R11.2: Primary | ICD-10-CM

## 2021-01-03 DIAGNOSIS — F32.9: ICD-10-CM

## 2021-01-03 DIAGNOSIS — R55: ICD-10-CM

## 2021-01-03 DIAGNOSIS — Z87.891: ICD-10-CM

## 2021-01-03 DIAGNOSIS — F41.9: ICD-10-CM

## 2021-01-03 DIAGNOSIS — F19.90: ICD-10-CM

## 2021-01-03 DIAGNOSIS — N39.0: ICD-10-CM

## 2021-01-03 LAB
ALBUMIN SERPL-MCNC: 5.2 GM/DL (ref 3.2–4.5)
ALP SERPL-CCNC: 79 U/L (ref 40–136)
ALT SERPL-CCNC: 20 U/L (ref 0–55)
AMYLASE SERPL-CCNC: 67 U/L (ref 25–125)
APAP SERPL-MCNC: < 10 UG/ML (ref 10–30)
APTT PPP: YELLOW S
BASOPHILS # BLD AUTO: 0.1 10^3/UL (ref 0–0.1)
BASOPHILS NFR BLD AUTO: 1 % (ref 0–10)
BILIRUB SERPL-MCNC: 0.9 MG/DL (ref 0.1–1)
BILIRUB UR QL STRIP: (no result)
BUN/CREAT SERPL: 15
CALCIUM SERPL-MCNC: 9.7 MG/DL (ref 8.5–10.1)
CHLORIDE SERPL-SCNC: 106 MMOL/L (ref 98–107)
CO2 SERPL-SCNC: 17 MMOL/L (ref 21–32)
CREAT SERPL-MCNC: 0.74 MG/DL (ref 0.6–1.3)
EOSINOPHIL # BLD AUTO: 0.1 10^3/UL (ref 0–0.3)
EOSINOPHIL NFR BLD AUTO: 1 % (ref 0–10)
FIBRINOGEN PPP-MCNC: CLEAR MG/DL
GFR SERPLBLD BASED ON 1.73 SQ M-ARVRAT: > 60 ML/MIN
GLUCOSE SERPL-MCNC: 84 MG/DL (ref 70–105)
GLUCOSE UR STRIP-MCNC: NEGATIVE MG/DL
HCT VFR BLD CALC: 43 % (ref 35–52)
HGB BLD-MCNC: 14.6 G/DL (ref 11.5–16)
KETONES UR QL STRIP: (no result)
LEUKOCYTE ESTERASE UR QL STRIP: NEGATIVE
LIPASE SERPL-CCNC: 22 U/L (ref 8–78)
LYMPHOCYTES # BLD AUTO: 1.1 10^3/UL (ref 1–4)
LYMPHOCYTES NFR BLD AUTO: 13 % (ref 12–44)
MAGNESIUM SERPL-MCNC: 2.2 MG/DL (ref 1.6–2.4)
MANUAL DIFFERENTIAL PERFORMED BLD QL: NO
MCH RBC QN AUTO: 29 PG (ref 25–34)
MCHC RBC AUTO-ENTMCNC: 34 G/DL (ref 32–36)
MCV RBC AUTO: 87 FL (ref 80–99)
MONOCYTES # BLD AUTO: 0.5 10^3/UL (ref 0–1)
MONOCYTES NFR BLD AUTO: 5 % (ref 0–12)
NEUTROPHILS # BLD AUTO: 6.9 10^3/UL (ref 1.8–7.8)
NEUTROPHILS NFR BLD AUTO: 80 % (ref 42–75)
NITRITE UR QL STRIP: NEGATIVE
PH UR STRIP: 6 [PH] (ref 5–9)
PLATELET # BLD: 270 10^3/UL (ref 130–400)
PMV BLD AUTO: 11.1 FL (ref 9–12.2)
POTASSIUM SERPL-SCNC: 3.4 MMOL/L (ref 3.6–5)
PROT SERPL-MCNC: 8.5 GM/DL (ref 6.4–8.2)
PROT UR QL STRIP: (no result)
SODIUM SERPL-SCNC: 141 MMOL/L (ref 135–145)
SP GR UR STRIP: >=1.03 (ref 1.02–1.02)
WBC # BLD AUTO: 8.6 10^3/UL (ref 4.3–11)

## 2021-01-03 PROCEDURE — 85025 COMPLETE CBC W/AUTO DIFF WBC: CPT

## 2021-01-03 PROCEDURE — 80329 ANALGESICS NON-OPIOID 1 OR 2: CPT

## 2021-01-03 PROCEDURE — 93005 ELECTROCARDIOGRAM TRACING: CPT

## 2021-01-03 PROCEDURE — 80053 COMPREHEN METABOLIC PANEL: CPT

## 2021-01-03 PROCEDURE — 83735 ASSAY OF MAGNESIUM: CPT

## 2021-01-03 PROCEDURE — 82150 ASSAY OF AMYLASE: CPT

## 2021-01-03 PROCEDURE — 93041 RHYTHM ECG TRACING: CPT

## 2021-01-03 PROCEDURE — 36415 COLL VENOUS BLD VENIPUNCTURE: CPT

## 2021-01-03 PROCEDURE — 84703 CHORIONIC GONADOTROPIN ASSAY: CPT

## 2021-01-03 PROCEDURE — 80320 DRUG SCREEN QUANTALCOHOLS: CPT

## 2021-01-03 PROCEDURE — 83690 ASSAY OF LIPASE: CPT

## 2021-01-03 PROCEDURE — 99284 EMERGENCY DEPT VISIT MOD MDM: CPT

## 2021-01-03 PROCEDURE — 81000 URINALYSIS NONAUTO W/SCOPE: CPT

## 2021-01-03 PROCEDURE — 80306 DRUG TEST PRSMV INSTRMNT: CPT

## 2021-01-03 PROCEDURE — 87088 URINE BACTERIA CULTURE: CPT

## 2021-01-03 NOTE — ED GENERAL
General


Chief Complaint:  Dizziness/Syncope


Stated Complaint:  SYNCOPE


Nursing Triage Note:  


PT ARRIVES [facility]IA [facility] FROM HOME WITH C/O SYNCOPAL EPISODES ET N/V. PT 


REPORTS APPROX X6 EPSIODES OF EMESIS THROUGHOUT THIS DAY. REPORTS APPROX X4 


SYNCOPAL EPISODES THROUGHOUT THIS DAY ET DENIES INJURY. REPORTS N/V BEGAN AFTER 


S/O BROKE UP WITH HER ON 20 ET BEGAN TO EXPERIENCE SYNCOPAL EPISODES ON 


THIS DAY. REPORTS DECREASE FOOD ET FLUID INTAKE. DENIES SUICIDAL IDEATION OR 


SELF HARM THOUGHTS OR ATTEMPT. A&OX4.


Nursing Sepsis Screen:  No Definite Risk


Source of Information:  Patient





History of Present Illness


Date Seen by Provider:  Eugene 3, 2021


Time Seen by Provider:  22:19


Initial Comments


PT ARRIVES VIA EMS FROM HOME


PT STATES SHE HAS HAD NAUSEA AND VOMITING SINCE 20 SHORTLY AFTER SHE BROKE

UP WITH HER BOYFRIEND--STATES SHE HAS NOT BEEN EATING OR DRINKING SINCE THE 

BREAK-UP, AND BEGAN HAVING THESE SYNCOPAL EPISODES SINCE THE BREAK-UP


STATES SHE HAS VOMITED X 6 TODAY, NO DIARRHEA


C/O MILD EPIGASTRIC DISCOMFORT / CRAMPING FROM VOMITING


STATES SHE "PASSED OUT" 4 TIMES TODAY--EACH TIME ONLY LASTING A COUPLE OF 

SECONDS, AND NO INJURY REPORTED WITH THESE EPISODES--THESE WERE NOT WITNESSED BY

EMS, BUT REPORTEDLY WERE WITNESSED BY OTHERS IN THE HOME


NO FEVER


STATES SHE IS VOIDING, BUT NOT AS MUCH AS NORMAL





NO HISTORY OF GI PROBLEMS


NO HISTORY OF SYNCOPE


NO HISTORY OF NEUROLOGICAL PROBLEMS 


NO HISTORY OF CARDIAC PROBLEMS





STATES SHE TOOK ONE OF HER MOM'S ZOFRAN TONIGHT, BUT VOMITED IT UP


DENIES TAKING ANY OTHER MEDICATIONS TODAY


DENIES ANY SUICIDAL IDEATIONS





PT HAS 2 CHILDREN. LAST ONE DELIVERED IN JULY OF THIS YEAR. HER BOYFRIEND IS 

FATHER OF BOTH CHILDREN


STATES HER MOM HAS ONE CHILD AND HER "EX" BOYFRIEND HAS THE OTHER CHILD





PCP: ANDREA-VANDANA





Allergies and Home Medications


Allergies


Coded Allergies:  


     kiwi (Unverified  Allergy, Unknown, 14)





Home Medications


Cephalexin 500 Mg Capsule, 500 MG PO TID


   Prescribed by: PILO POLLARD on 20 1609


Docusate Sodium 100 Mg Capsule, 100 MG PO BID


   Prescribed by: NORMAN HATHAWAY on 20 1209


Ibuprofen 800 Mg Tablet, 800 MG PO Q6HR


   Prescribed by: NORMAN HATHAWAY on 20 1209


Nitrofurantoin Monohyd/M-Cryst 100 Mg Capsule, 1 TAB PO BID


   Prescribed by: JAMILAH MOSER on 21 003


Ondansetron 4 Mg Tab.rapdis, 4 MG PO Q4H


   Prescribed by: JAMILAH MOSER on 21 0035


Oxycodone HCl/Acetaminophen 1 Each Tablet, 1 TAB PO Q4HR PRN for PAINMODS


   Prescribed by: NORMAN HATHAWAY on 20 1209





Patient Home Medication List


Home Medication List Reviewed:  Yes





Review of Systems


Review of Systems


Constitutional:  see HPI; No chills, No diaphoresis, No fever; malaise, weakness


EENTM:  no symptoms reported


Respiratory:  no symptoms reported; No cough, No short of breath


Cardiovascular:  see HPI; No chest pain, No edema, No palpitations; syncope; No 

vascular heart diseas


Gastrointestinal:  see HPI, abdominal pain; No diarrhea; loss of appetite, 

nausea, vomiting


Genitourinary:  see HPI, decreased output


Musculoskeletal:  no symptoms reported


Skin:  no symptoms reported


Psychiatric/Neurological:  See HPI; Denies Headache, Denies Numbness, Denies 

Paresthesia, Denies Seizure, Denies Tingling, Denies Weakness


Hematologic/Lymphatic:  No Symptoms Reported


Immunological/Allergic:  no symptoms reported





Past Medical-Social-Family Hx


Past Med/Social Hx:  Reviewed and Corrections made


Patient Social History


Alcohol Use:  Denies Use


Recreational Drug Use:  Yes (THC; UDS + FOR BENZO'S AND THC ON 20)


Drug of Choice:  THC; UDS + FOR BENZO'S AND THC 20


Smoking Status:  Former Smoker (SMOKED < 1 PPD, QUIT 10/2020)


Type Used:  Cigarettes


Former Smoker, Quit:  Oct 1, 2020


2nd Hand Smoke Exposure:  No


Recent Foreign Travel:  No


Contact w/Someone Who Travel:  No


Recent Infectious Disease Expo:  No


Recent Hopitalizations:  No


Physical Abuse:  No


Sexual Abuse:  No





Immunizations Up To Date


PED Vaccines UTD:  Yes





Seasonal Allergies


Seasonal Allergies:  Yes





Past Medical History


Surgeries:  Yes (WISDOM TEETH REMOVED;  X 2; T&A)


Adenoidectomy,  Section, Tonsillectomy


Respiratory:  No


Cardiac:  No


Neurological:  No


Reproductive Disorders:  No


Female Reproductive Disorders:  Ovarian Cyst


Genitourinary:  No


Gastrointestinal:  No


Musculoskeletal:  No


Endocrine:  No


HEENT:  No


Cancer:  No


Psychosocial:  No


Integumentary:  No


Blood Disorders:  No


Adverse Reaction/Blood Tranf:  No





Family Medical History





Not obtainable due to adoption





Physical Exam


Vital Signs





Vital Signs - First Documented








 1/3/21





 22:30


 


Temp 36.6


 


Pulse 94


 


Resp 16


 


B/P (MAP) 110/69 (83)


 


Pulse Ox 99


 


O2 Delivery Room Air





Capillary Refill : Less Than 3 Seconds


Height, Weight, BMI


Height: 5'2.00"


Weight: 95lbs. 0.0oz. 43.766198la; 21.00 BMI


Method:Stated


General Appearance:  No Apparent Distress, WD/WN, Thin, Other (VERY FLAT AFFECT)


HEENT:  PERRL/EOMI


Neck:  Normal Inspection


Respiratory:  Normal Breath Sounds, No Accessory Muscle Use, No Respiratory Dis

tress


Cardiovascular:  Regular Rate, Rhythm, No Edema, No JVD, No Murmur, Normal 

Peripheral Pulses


Gastrointestinal:  Normal Bowel Sounds, No Organomegaly, No Pulsatile Mass, 

Soft, Tenderness (MILD EPIGASTRIC TENDERNESS)


Back:  No CVA Tenderness


Extremity:  Normal Inspection


Neurologic/Psychiatric:  Alert, Oriented x3, No Motor/Sensory Deficits, CNs II-

XII Norm as Tested, Other (VERY FLAT AFFECT. )


Skin:  Warm/Dry, Pallor, Tattoos/Piercings (TATTOOS)





Progress/Results/Core Measures


Suspected Sepsis


Recent Fever Within 48 Hours:  No


Infection Criteria Present:  Suspected New Infection


New/Unexplained  Altered Menta:  No


Sepsis Screen:  No Definite Risk


SIRS


Temperature: 


Pulse: 124 


Respiratory Rate: 16


 


Laboratory Tests


1/3/21 22:22: White Blood Count 8.6


Blood Pressure 131 /101 


Mean: 111


 


Laboratory Tests


1/3/21 22:22: 


Creatinine 0.74, Platelet Count 270, Total Bilirubin 0.9








Results/Orders


Lab Results





Laboratory Tests








Test


 1/3/21


22:22 1/3/21


23:45 Range/Units


 


 


White Blood Count


 8.6 


 


 4.3-11.0


10^3/uL


 


Red Blood Count


 4.96 


 


 3.80-5.11


10^6/uL


 


Hemoglobin 14.6   11.5-16.0  g/dL


 


Hematocrit 43   35-52  %


 


Mean Corpuscular Volume 87   80-99  fL


 


Mean Corpuscular Hemoglobin 29   25-34  pg


 


Mean Corpuscular Hemoglobin


Concent 34 


 


 32-36  g/dL





 


Red Cell Distribution Width 12.3   10.0-14.5  %


 


Platelet Count


 270 


 


 130-400


10^3/uL


 


Mean Platelet Volume 11.1   9.0-12.2  fL


 


Immature Granulocyte % (Auto) 0    %


 


Neutrophils (%) (Auto) 80 H  42-75  %


 


Lymphocytes (%) (Auto) 13   12-44  %


 


Monocytes (%) (Auto) 5   0-12  %


 


Eosinophils (%) (Auto) 1   0-10  %


 


Basophils (%) (Auto) 1   0-10  %


 


Neutrophils # (Auto)


 6.9 


 


 1.8-7.8


10^3/uL


 


Lymphocytes # (Auto)


 1.1 


 


 1.0-4.0


10^3/uL


 


Monocytes # (Auto)


 0.5 


 


 0.0-1.0


10^3/uL


 


Eosinophils # (Auto)


 0.1 


 


 0.0-0.3


10^3/uL


 


Basophils # (Auto)


 0.1 


 


 0.0-0.1


10^3/uL


 


Immature Granulocyte # (Auto)


 0.0 


 


 0.0-0.1


10^3/uL


 


Sodium Level 141   135-145  MMOL/L


 


Potassium Level 3.4 L  3.6-5.0  MMOL/L


 


Chloride Level 106     MMOL/L


 


Carbon Dioxide Level 17 L  21-32  MMOL/L


 


Anion Gap 18 H  5-14  MMOL/L


 


Blood Urea Nitrogen 11   7-18  MG/DL


 


Creatinine


 0.74 


 


 0.60-1.30


MG/DL


 


Estimat Glomerular Filtration


Rate > 60 


 


  





 


BUN/Creatinine Ratio 15    


 


Glucose Level 84     MG/DL


 


Calcium Level 9.7   8.5-10.1  MG/DL


 


Corrected Calcium    8.5-10.1  MG/DL


 


Magnesium Level 2.2   1.6-2.4  MG/DL


 


Total Bilirubin 0.9   0.1-1.0  MG/DL


 


Aspartate Amino Transf


(AST/SGOT) 21 


 


 5-34  U/L





 


Alanine Aminotransferase


(ALT/SGPT) 20 


 


 0-55  U/L





 


Alkaline Phosphatase 79     U/L


 


Total Protein 8.5 H  6.4-8.2  GM/DL


 


Albumin 5.2 H  3.2-4.5  GM/DL


 


Amylase Level 67     U/L


 


Lipase 22   8-78  U/L


 


Acetaminophen Level < 10 L  10-30  UG/ML


 


Serum Alcohol < 10   <10  MG/DL


 


Urine Color  YELLOW   


 


Urine Clarity  CLEAR   


 


Urine pH  6.0  5-9  


 


Urine Specific Gravity  >=1.030  1.016-1.022  


 


Urine Protein  2+ H NEGATIVE  


 


Urine Glucose (UA)  NEGATIVE  NEGATIVE  


 


Urine Ketones  3+ H NEGATIVE  


 


Urine Nitrite  NEGATIVE  NEGATIVE  


 


Urine Bilirubin  1+ H NEGATIVE  


 


Urine Urobilinogen  0.2  < = 1.0  MG/DL


 


Urine Leukocyte Esterase  NEGATIVE  NEGATIVE  


 


Urine RBC (Auto)  TRACE-I  NEGATIVE  


 


Urine RBC  0-2   /HPF


 


Urine WBC  2-5   /HPF


 


Urine Squamous Epithelial


Cells 


 2-5 


  /HPF





 


Urine Crystals  NONE   /LPF


 


Urine Bacteria  FEW H  /HPF


 


Urine Casts  NONE   /LPF


 


Urine Mucus  NEGATIVE   /LPF


 


Urine Culture Indicated  YES   


 


Urine Opiates Screen  NEGATIVE  NEGATIVE  


 


Urine Oxycodone Screen  NEGATIVE  NEGATIVE  


 


Urine Methadone Screen  NEGATIVE  NEGATIVE  


 


Urine Propoxyphene Screen  NEGATIVE  NEGATIVE  


 


Urine Barbiturates Screen  NEGATIVE  NEGATIVE  


 


Ur Tricyclic Antidepressants


Screen 


 NEGATIVE 


 NEGATIVE  





 


Urine Phencyclidine Screen  NEGATIVE  NEGATIVE  


 


Urine Amphetamines Screen  NEGATIVE  NEGATIVE  


 


Urine Methamphetamines Screen  NEGATIVE  NEGATIVE  


 


Urine Benzodiazepines Screen  POSITIVE H NEGATIVE  


 


Urine Cocaine Screen  NEGATIVE  NEGATIVE  


 


Urine Cannabinoids Screen  POSITIVE H NEGATIVE  








My Orders





Orders - JAMILAH MOSER DO


Ed Iv/Invasive Line Start (1/3/21 22:19)


Urine Pregnancy Bedside (1/3/21 22:19)


Ekg Tracing (1/3/21 22:19)


Monitor-Rhythm Ecg Trace Only (1/3/21 22:19)


Orthostatic Vital Signs (Adult (1/3/21 22:19)


Acetaminophen (1/3/21 22:19)


Alcohol (1/3/21 22:19)


Amylase (1/3/21 22:19)


Cbc With Automated Diff (1/3/21 22:19)


Comprehensive Metabolic Panel (1/3/21 22:19)


Drug Screen Stat (Urine) (1/3/21 22:19)


Lipase (1/3/21 22:19)


Magnesium (1/3/21 22:19)


Ua Culture If Indicated (1/3/21 22:19)


Ed Iv/Invasive Line Start (1/3/21 22:19)


Lactated Ringers (Lr 1000 Ml Iv Solution (1/3/21 22:30)


Ondansetron Injection (Zofran Injectio (1/3/21 22:30)


Ed Iv/Invasive Line Start (1/3/21 22:51)


Lactated Ringers (Lr 1000 Ml Iv Solution (1/3/21 23:00)


Urine Culture (1/3/21 23:45)


Rx-Nitrofurantoin Mono (Rx-Macrobid) (21 00:38)


Rx-Ondansetron Po (Rx-Zofran Po) (21 00:38)





Medications Given in ED





Current Medications








 Medications  Dose


 Ordered  Sig/Arturo


 Route  Start Time


 Stop Time Status Last Admin


Dose Admin


 


 Lactated Ringer's  1,000 ml @ 


 0 mls/hr  Q0M ONCE


 IV  1/3/21 22:30


 1/3/21 22:31 DC 1/3/21 22:28


0 MLS/HR


 


 Lactated Ringer's  1,000 ml @ 


 0 mls/hr  Q0M ONCE


 IV  1/3/21 23:00


 1/3/21 23:01 DC 1/3/21 22:54


0 MLS/HR


 


 Ondansetron HCl  4 mg  ONCE  ONCE


 IVP  1/3/21 22:30


 1/3/21 22:31 DC 1/3/21 22:28


4 MG








Vital Signs/I&O











 1/3/21 1/3/21 1/4/21





 22:30 22:39 00:50


 


Temp 36.6  36.6


 


Pulse 94 74 93





  106 





  124 


 


Resp 16  16


 


B/P (MAP) 110/69 (83) 105/80 (88) 119/83 (111)





  120/91 (101) 





  131/101 (111) 


 


Pulse Ox 99  99


 


O2 Delivery Room Air  Room Air














 21





 00:00


 


Intake Total 2000 ml


 


Balance 2000 ml





Capillary Refill : Less Than 3 Seconds








Blood Pressure Mean:                    111








Progress Note :  


Progress Note


ORTHOSTATICS MILDLY ABNORMAL


GIVEN IV FLUIDS AND ZOFRAN


NAUSEA IMPROVED AND NO VOMITING DURING ER STAY


NO SYNCOPE DURING ER STAY


PT AMBULATED TO AND FROM BATHROOM ON HER OWN. 


PT AMBULATED OUT OF ER ON HER OWN WITHOUT ANY DIFFICULTY WHATSOEVER. 


VITALS IMPROVED AT DISMISSAL--HEART RATE DOWN TO 70'S, BP UP.





PT NOTED TO BE TAKING MULTIPLE "SELFIES" OF HERSELF DURING ER STAY, IN HOSPITAL 

GOWN, WITH IV AND HOOKED UP TO MONITOR, AND IS NOTED THAT SHE NOW HAS ONE OF 

THESE PICTURES AS HER SCREEN SAVER ON HER PHONE





ECG


Initial ECG Impression Date:  Eugene 3, 2021


Initial ECG Impression Time:  22:29


Initial ECG Rate:  91


Initial ECG Rhythm:  Normal Sinus





Departure


Impression





   Primary Impression:  


   Nausea & vomiting


   Additional Impressions:  


   REPORTED SYNCOPAL EPISODES


   SITUATIONAL DEPRESSION AND ANXIETY


   Urinary tract infection


   Illicit drug use


   Mild dehydration


Disposition:  01 HOME, SELF-CARE


Condition:  Improved





Departure-Patient Inst.


Referrals:  


NO,LOCAL PHYSICIAN (PCP)


Primary Care Physician








NORMAN NEAL MD (Family)


Primary Care Physician








[facility]


Patient Instructions:  Dehydration, Adult (DC), Depression, Adult (DC), 

Generalized Anxiety Disorder (DC), Nausea and Vomiting, Adult, Urinary Tract 

Infection, Adult ED





Add. Discharge Instructions:  


NO DRUGS!!!!!





LOTS OF CLEAR LIQUIDS--WATER, BROTH, JELLO, GATORADE--DRINK ENOUGH SO YOU ARE 

URINATING EVERY 2-3 HOURS WHILE AWAKE





TOMORROW, IF YOU ARE BETTER, ADD BRATS DIET TO CLEAR LIQUIDS--BANANAS, RICE, 

APPLESAUCE, TOAST, SALTINES





FOLLOW UP WITH  YOUR DR TOMORROW IF NO BETTER, RETURN TO ER IF WORSE





All discharge instructions reviewed with patient and/or family. Voiced 

understanding.


Scripts


Nitrofurantoin Monohyd/M-Cryst (Macrobid 100 mg Capsule) 100 Mg Capsule


1 TAB PO BID, #20 CAP


   Prov: JAMILAH MOSER DO         21 


Ondansetron (Ondansetron Odt) 4 Mg Tab.rapdis


4 MG PO Q4H for Nausea/Vomiting, #10 TAB


   Prov: JAMILAH MOSER DO         21











JAMILAH MOSER DO                  Eugene 3, 2021 23:18

## 2021-01-04 VITALS — DIASTOLIC BLOOD PRESSURE: 83 MMHG | SYSTOLIC BLOOD PRESSURE: 119 MMHG

## 2021-01-04 LAB
BACTERIA #/AREA URNS HPF: (no result) /HPF
BARBITURATES UR QL: NEGATIVE
BENZODIAZ UR QL SCN: POSITIVE
COCAINE UR QL: NEGATIVE
METHADONE UR QL SCN: NEGATIVE
METHAMPHETAMINE SCREEN URINE S: NEGATIVE
OPIATES UR QL SCN: NEGATIVE
OXYCODONE UR QL: NEGATIVE
PROPOXYPH UR QL: NEGATIVE
RBC #/AREA URNS HPF: (no result) /HPF
SQUAMOUS #/AREA URNS HPF: (no result) /HPF
TRICYCLICS UR QL SCN: NEGATIVE
WBC #/AREA URNS HPF: (no result) /HPF

## 2021-08-23 ENCOUNTER — HOSPITAL ENCOUNTER (EMERGENCY)
Dept: HOSPITAL 75 - ER | Age: 26
Discharge: HOME | End: 2021-08-23
Payer: MEDICAID

## 2021-08-23 VITALS — HEIGHT: 61.81 IN | WEIGHT: 105.16 LBS | BODY MASS INDEX: 19.35 KG/M2

## 2021-08-23 VITALS — SYSTOLIC BLOOD PRESSURE: 109 MMHG | DIASTOLIC BLOOD PRESSURE: 70 MMHG
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
## 2021-08-23 DIAGNOSIS — S80.01XA: ICD-10-CM

## 2021-08-23 DIAGNOSIS — M54.6: ICD-10-CM

## 2021-08-23 DIAGNOSIS — S70.12XA: Primary | ICD-10-CM

## 2021-08-23 DIAGNOSIS — F15.23: ICD-10-CM

## 2021-08-23 DIAGNOSIS — S40.211A: ICD-10-CM

## 2021-08-23 LAB
ALBUMIN SERPL-MCNC: 4.2 GM/DL (ref 3.2–4.5)
ALP SERPL-CCNC: 52 U/L (ref 40–136)
ALT SERPL-CCNC: 23 U/L (ref 0–55)
AMORPH SED URNS QL MICRO: (no result) /LPF
APTT PPP: YELLOW S
BACTERIA #/AREA URNS HPF: (no result) /HPF
BARBITURATES UR QL: NEGATIVE
BASOPHILS # BLD AUTO: 0.1 10^3/UL (ref 0–0.1)
BASOPHILS NFR BLD AUTO: 1 % (ref 0–10)
BENZODIAZ UR QL SCN: NEGATIVE
BILIRUB SERPL-MCNC: 0.3 MG/DL (ref 0.1–1)
BILIRUB UR QL STRIP: NEGATIVE
BUN/CREAT SERPL: 15
CALCIUM SERPL-MCNC: 10 MG/DL (ref 8.5–10.1)
CHLORIDE SERPL-SCNC: 102 MMOL/L (ref 98–107)
CO2 SERPL-SCNC: 27 MMOL/L (ref 21–32)
COCAINE UR QL: NEGATIVE
CREAT SERPL-MCNC: 0.62 MG/DL (ref 0.6–1.3)
EOSINOPHIL # BLD AUTO: 0.2 10^3/UL (ref 0–0.3)
EOSINOPHIL NFR BLD AUTO: 3 % (ref 0–10)
FIBRINOGEN PPP-MCNC: (no result) MG/DL
GFR SERPLBLD BASED ON 1.73 SQ M-ARVRAT: 118 ML/MIN
GLUCOSE SERPL-MCNC: 77 MG/DL (ref 70–105)
GLUCOSE UR STRIP-MCNC: NEGATIVE MG/DL
HCT VFR BLD CALC: 43 % (ref 35–52)
HGB BLD-MCNC: 14.1 G/DL (ref 11.5–16)
KETONES UR QL STRIP: NEGATIVE
LEUKOCYTE ESTERASE UR QL STRIP: (no result)
LYMPHOCYTES # BLD AUTO: 3.4 10^3/UL (ref 1–4)
LYMPHOCYTES NFR BLD AUTO: 43 % (ref 12–44)
MANUAL DIFFERENTIAL PERFORMED BLD QL: NO
MCH RBC QN AUTO: 31 PG (ref 25–34)
MCHC RBC AUTO-ENTMCNC: 33 G/DL (ref 32–36)
MCV RBC AUTO: 94 FL (ref 80–99)
METHADONE UR QL SCN: NEGATIVE
METHAMPHETAMINE SCREEN URINE S: NEGATIVE
MONOCYTES # BLD AUTO: 0.6 10^3/UL (ref 0–1)
MONOCYTES NFR BLD AUTO: 7 % (ref 0–12)
NEUTROPHILS # BLD AUTO: 3.7 10^3/UL (ref 1.8–7.8)
NEUTROPHILS NFR BLD AUTO: 47 % (ref 42–75)
NITRITE UR QL STRIP: NEGATIVE
OPIATES UR QL SCN: NEGATIVE
OXYCODONE UR QL: NEGATIVE
PH UR STRIP: 6 [PH] (ref 5–9)
PLATELET # BLD: 327 10^3/UL (ref 130–400)
PMV BLD AUTO: 11.1 FL (ref 9–12.2)
POTASSIUM SERPL-SCNC: 3.8 MMOL/L (ref 3.6–5)
PROPOXYPH UR QL: NEGATIVE
PROT SERPL-MCNC: 7.1 GM/DL (ref 6.4–8.2)
PROT UR QL STRIP: NEGATIVE
RBC #/AREA URNS HPF: (no result) /HPF
SODIUM SERPL-SCNC: 141 MMOL/L (ref 135–145)
SP GR UR STRIP: 1.01 (ref 1.02–1.02)
TRICYCLICS UR QL SCN: NEGATIVE
WBC # BLD AUTO: 7.9 10^3/UL (ref 4.3–11)
WBC #/AREA URNS HPF: (no result) /HPF

## 2021-08-23 PROCEDURE — 72128 CT CHEST SPINE W/O DYE: CPT

## 2021-08-23 PROCEDURE — 80306 DRUG TEST PRSMV INSTRMNT: CPT

## 2021-08-23 PROCEDURE — 36415 COLL VENOUS BLD VENIPUNCTURE: CPT

## 2021-08-23 PROCEDURE — 71100 X-RAY EXAM RIBS UNI 2 VIEWS: CPT

## 2021-08-23 PROCEDURE — 80053 COMPREHEN METABOLIC PANEL: CPT

## 2021-08-23 PROCEDURE — 84703 CHORIONIC GONADOTROPIN ASSAY: CPT

## 2021-08-23 PROCEDURE — 85025 COMPLETE CBC W/AUTO DIFF WBC: CPT

## 2021-08-23 PROCEDURE — 72131 CT LUMBAR SPINE W/O DYE: CPT

## 2021-08-23 PROCEDURE — 73562 X-RAY EXAM OF KNEE 3: CPT

## 2021-08-23 PROCEDURE — 86141 C-REACTIVE PROTEIN HS: CPT

## 2021-08-23 PROCEDURE — 72125 CT NECK SPINE W/O DYE: CPT

## 2021-08-23 PROCEDURE — 81000 URINALYSIS NONAUTO W/SCOPE: CPT
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
## 2022-08-17 ENCOUNTER — HOSPITAL ENCOUNTER (EMERGENCY)
Dept: HOSPITAL 75 - ER | Age: 27
Discharge: HOME | End: 2022-08-17
Payer: MEDICAID

## 2022-08-17 VITALS — HEIGHT: 61.97 IN | WEIGHT: 99.87 LBS | BODY MASS INDEX: 18.38 KG/M2

## 2022-08-17 VITALS — SYSTOLIC BLOOD PRESSURE: 118 MMHG | DIASTOLIC BLOOD PRESSURE: 79 MMHG

## 2022-08-17 DIAGNOSIS — U07.1: Primary | ICD-10-CM

## 2022-08-17 DIAGNOSIS — Z28.310: ICD-10-CM

## 2022-08-17 LAB
ALBUMIN SERPL-MCNC: 4 GM/DL (ref 3.2–4.5)
ALP SERPL-CCNC: 42 U/L (ref 40–136)
ALT SERPL-CCNC: 11 U/L (ref 0–55)
APTT PPP: YELLOW S
BACTERIA #/AREA URNS HPF: (no result) /HPF
BASOPHILS # BLD AUTO: 0 10^3/UL (ref 0–0.1)
BASOPHILS NFR BLD AUTO: 0 % (ref 0–10)
BILIRUB SERPL-MCNC: 0.3 MG/DL (ref 0.1–1)
BILIRUB UR QL STRIP: NEGATIVE
BUN/CREAT SERPL: 15
CALCIUM SERPL-MCNC: 8.6 MG/DL (ref 8.5–10.1)
CHLORIDE SERPL-SCNC: 102 MMOL/L (ref 98–107)
CO2 SERPL-SCNC: 26 MMOL/L (ref 21–32)
CREAT SERPL-MCNC: 0.54 MG/DL (ref 0.6–1.3)
EOSINOPHIL # BLD AUTO: 0 10^3/UL (ref 0–0.3)
EOSINOPHIL NFR BLD AUTO: 0 % (ref 0–10)
FIBRINOGEN PPP-MCNC: CLEAR MG/DL
GFR SERPLBLD BASED ON 1.73 SQ M-ARVRAT: 131 ML/MIN
GLUCOSE SERPL-MCNC: 90 MG/DL (ref 70–105)
GLUCOSE UR STRIP-MCNC: NEGATIVE MG/DL
HCT VFR BLD CALC: 36 % (ref 35–52)
HGB BLD-MCNC: 12.5 G/DL (ref 11.5–16)
KETONES UR QL STRIP: NEGATIVE
LEUKOCYTE ESTERASE UR QL STRIP: (no result)
LIPASE SERPL-CCNC: 23 U/L (ref 8–78)
LYMPHOCYTES # BLD AUTO: 0.2 10^3/UL (ref 1–4)
LYMPHOCYTES NFR BLD AUTO: 8 % (ref 12–44)
MANUAL DIFFERENTIAL PERFORMED BLD QL: NO
MCH RBC QN AUTO: 31 PG (ref 25–34)
MCHC RBC AUTO-ENTMCNC: 35 G/DL (ref 32–36)
MCV RBC AUTO: 89 FL (ref 80–99)
MONOCYTES # BLD AUTO: 0.3 10^3/UL (ref 0–1)
MONOCYTES NFR BLD AUTO: 11 % (ref 0–12)
NEUTROPHILS # BLD AUTO: 2.5 10^3/UL (ref 1.8–7.8)
NEUTROPHILS NFR BLD AUTO: 81 % (ref 42–75)
NITRITE UR QL STRIP: NEGATIVE
PH UR STRIP: 7 [PH] (ref 5–9)
PLATELET # BLD: 178 10^3/UL (ref 130–400)
PMV BLD AUTO: 10.4 FL (ref 9–12.2)
POTASSIUM SERPL-SCNC: 3.3 MMOL/L (ref 3.6–5)
PROT SERPL-MCNC: 6.3 GM/DL (ref 6.4–8.2)
PROT UR QL STRIP: NEGATIVE
RBC #/AREA URNS HPF: (no result) /HPF
SODIUM SERPL-SCNC: 137 MMOL/L (ref 135–145)
SP GR UR STRIP: <=1.005 (ref 1.02–1.02)
SQUAMOUS #/AREA URNS HPF: (no result) /HPF
WBC # BLD AUTO: 3.1 10^3/UL (ref 4.3–11)
WBC #/AREA URNS HPF: (no result) /HPF

## 2022-08-17 PROCEDURE — 80053 COMPREHEN METABOLIC PANEL: CPT

## 2022-08-17 PROCEDURE — 36415 COLL VENOUS BLD VENIPUNCTURE: CPT

## 2022-08-17 PROCEDURE — 93005 ELECTROCARDIOGRAM TRACING: CPT

## 2022-08-17 PROCEDURE — 85025 COMPLETE CBC W/AUTO DIFF WBC: CPT

## 2022-08-17 PROCEDURE — 84703 CHORIONIC GONADOTROPIN ASSAY: CPT

## 2022-08-17 PROCEDURE — 71045 X-RAY EXAM CHEST 1 VIEW: CPT

## 2022-08-17 PROCEDURE — 87088 URINE BACTERIA CULTURE: CPT

## 2022-08-17 PROCEDURE — 87636 SARSCOV2 & INF A&B AMP PRB: CPT

## 2022-08-17 PROCEDURE — 81000 URINALYSIS NONAUTO W/SCOPE: CPT

## 2022-08-17 PROCEDURE — 83690 ASSAY OF LIPASE: CPT

## 2022-08-17 NOTE — ED COUGH/URI
General


Chief Complaint:  COVID19 Suspect/Confirmed


Stated Complaint:  HEAVINESS IN CHEST, COVID EXP


Source:  patient


Exam Limitations:  no limitations





History of Present Illness


Date Seen by Provider:  Aug 17, 2022


Time Seen by Provider:  13:52


Initial Comments


Patient is a 26-year-old female who presents the ED with multiple complaints.  

Patient states that she has chest heaviness, headache, bilateral leg numbness, 

epigastric pain, diffuse sweating since yesterday.  She states she has some 

heaviness in the chest with some epigastric discomfort.  She has vomited 10+ 

episodes without any bile or blood with mucus.  Denies any diarrhea.  She does 

report some discomfort that is intermittent to her epigastric region.  She 

states her chest feels heavy with a cough without shortness of breath.  Scratchy

throat with headache.  She states she had diffuse sweating 2 hours before 

arrival and took Motrin without much improvement.  She reports possible exposure

to COVID with a family member that is exposed to her mother.  She states she 

feels dehydrated and not urinating.  No known medical problems.  Not concern for

pregnancy.  Denies any visual changes, unilateral muscle weakness.  She does 

report coolness to her lower extremities for the past week and a half.  She 

reports numbness and tingling sensation.  Denies any bruising, redness or skin 

color changes.  No trauma to her lower extremities





Allergies and Home Medications


Allergies


Coded Allergies:  


     kiwi (Unverified  Allergy, Unknown, 14)





Patient Home Medication List


Home Medication List Reviewed:  Yes


Cephalexin (Keflex) 500 Mg Capsule, 500 MG PO TID


   Prescribed by: PILO POLLARD on 20 1609


Cephalexin (Cephalexin) 500 Mg Tablet, 500 MG PO BID


   Prescribed by: GEN CISNEROS on 22 1519


Docusate Sodium (Dok) 100 Mg Capsule, 100 MG PO BID


   Prescribed by: NORMAN HATHAWAY on 20 1209


Ibuprofen (Ibuprofen) 800 Mg Tablet, 800 MG PO Q6HR


   Prescribed by: NORMAN HATHAWAY on 20 1209


Lorazepam (Ativan) 0.5 Mg Tablet, 0.5 MG PO TID PRN for ANXIETY


   Prescribed by: MATT BYNUM on 21


Nitrofurantoin Monohyd/M-Cryst (Macrobid 100 mg Capsule) 100 Mg Capsule, 1 TAB 

PO BID


   Prescribed by: JAMILAH MOSER on 21 0038


Ondansetron (Ondansetron Odt) 4 Mg Tab.rapdis, 4 MG PO Q4H


   Prescribed by: JAMILAH MOSER on 21 0035


Ondansetron (Ondansetron Odt) 4 Mg Tab.rapdis, 4 MG PO Q4H


   Prescribed by: GEN CISNEROS on 22 1519


Oxycodone HCl/Acetaminophen (Percocet  mg Tablet) 1 Each Tablet, 1 TAB PO 

Q4HR PRN for PAINMODS


   Prescribed by: NORMAN HATHAWAY on 20 1209





Review of Systems


Review of Systems


Constitutional:  No chills, No diaphoresis; malaise


EENTM:  throat pain; No ear pain, No blurred vision, No double vision, No mouth 

pain, No mouth swelling


Respiratory:  No cough, No dyspnea on exertion, No short of breath


Cardiovascular:  No chest pain


Gastrointestinal:  No abdominal pain, No diarrhea, No nausea, No vomiting


Genitourinary:  No decreased output, No discharge


Musculoskeletal:  No back pain, No joint pain, No joint swelling, No muscle pain


Skin:  No change in color, No change in hair/nails





All Other Systems Reviewed


Negative Unless Noted:  Yes





Past Medical-Social-Family Hx


Immunizations Up To Date


PED Vaccines UTD:  Yes





Seasonal Allergies


Seasonal Allergies:  Yes





Past Medical History


Surgeries:  Yes (WISDOM TEETH REMOVED;  X 2; T&A)


Adenoidectomy,  Section, Tonsillectomy


Respiratory:  No


Cardiac:  No


Neurological:  No


Reproductive Disorders:  No


Female Reproductive Disorders:  Ovarian Cyst


Genitourinary:  No


Gastrointestinal:  No


Musculoskeletal:  No


Endocrine:  No


HEENT:  No


Cancer:  No


Psychosocial:  No


Integumentary:  No


Blood Disorders:  No


Adverse Reaction/Blood Tranf:  No





Family Medical History





Not obtainable due to adoption





Physical Exam





Vital Signs - First Documented








 22





 13:28


 


Temp 37.2


 


Pulse 112


 


Resp 14


 


B/P (MAP) 114/83 (93)


 


Pulse Ox 98


 


O2 Delivery Room Air





Capillary Refill :


Height: 5'2.00"


Weight: 95lbs. 0.0oz. [id number]; 19.00 BMI


Method:Stated


General Appearance:  WD/WN, no apparent distress


Eyes:  Bilateral Eye Normal Inspection, Bilateral Eye PERRL, Bilateral Eye EOMI


HEENT:  PERRL/EOMI, normal ENT inspection, TMs normal, pharynx normal


Neck:  non-tender, full range of motion, supple, normal inspection


Respiratory:  chest non-tender, lungs clear, normal breath sounds, no 

respiratory distress, no accessory muscle use


Cardiovascular:  no edema, no gallop, no JVD, tachycardia


Gastrointestinal:  normal bowel sounds, non tender, soft


Extremities:  normal range of motion, non-tender, normal inspection, no pedal 

edema, no calf tenderness


Neurologic/Psychiatric:  CNs II-XII nml as tested, no motor/sensory deficits, 

alert, normal mood/affect, oriented x 3


Skin:  normal color, warm/dry





Progress/Results/Core Measures


Suspected Sepsis


SIRS


Temperature: 


Pulse:  


Respiratory Rate: 


 


Laboratory Tests


22 14:37: White Blood Count 3.1L


Blood Pressure  / 


Mean: 


 





Laboratory Tests


22 14:37: 


Creatinine 0.54L, Platelet Count 178, Total Bilirubin 0.3








Results/Orders


Lab Results





Laboratory Tests








Test


 22


13:30 22


13:49 22


14:37 Range/Units


 


 


Influenza Type A (RT-PCR) Not Detected    Not Detecte  


 


Influenza Type B (RT-PCR) Not Detected    Not Detecte  


 


SARS-CoV-2 RNA (RT-PCR) Detected H   Not Detecte  


 


Urine Color  YELLOW    


 


Urine Clarity  CLEAR    


 


Urine pH  7.0   5-9  


 


Urine Specific Gravity  <=1.005   1.016-1.022  


 


Urine Protein  NEGATIVE   NEGATIVE  


 


Urine Glucose (UA)  NEGATIVE   NEGATIVE  


 


Urine Ketones  NEGATIVE   NEGATIVE  


 


Urine Nitrite  NEGATIVE   NEGATIVE  


 


Urine Bilirubin  NEGATIVE   NEGATIVE  


 


Urine Urobilinogen  0.2   < = 1.0  MG/DL


 


Urine Leukocyte Esterase  3+ H  NEGATIVE  


 


Urine RBC (Auto)  TRACE-I H  NEGATIVE  


 


Urine RBC  RARE    /HPF


 


Urine WBC  25-50 H   /HPF


 


Urine Squamous Epithelial


Cells 


 5-10 


 


  /HPF





 


Urine Crystals  NONE    /LPF


 


Urine Bacteria  MODERATE H   /HPF


 


Urine Casts  NONE    /LPF


 


Urine Mucus  NEGATIVE    /LPF


 


Urine Culture Indicated  YES    


 


White Blood Count


 


 


 3.1 L


 4.3-11.0


10^3/uL


 


Red Blood Count


 


 


 4.08 


 3.80-5.11


10^6/uL


 


Hemoglobin   12.5  11.5-16.0  g/dL


 


Hematocrit   36  35-52  %


 


Mean Corpuscular Volume   89  80-99  fL


 


Mean Corpuscular Hemoglobin   31  25-34  pg


 


Mean Corpuscular Hemoglobin


Concent 


 


 35 


 32-36  g/dL





 


Red Cell Distribution Width   12.0  10.0-14.5  %


 


Platelet Count


 


 


 178 


 130-400


10^3/uL


 


Mean Platelet Volume   10.4  9.0-12.2  fL


 


Immature Granulocyte % (Auto)   0   %


 


Neutrophils (%) (Auto)   81 H 42-75  %


 


Lymphocytes (%) (Auto)   8 L 12-44  %


 


Monocytes (%) (Auto)   11  0-12  %


 


Eosinophils (%) (Auto)   0  0-10  %


 


Basophils (%) (Auto)   0  0-10  %


 


Neutrophils # (Auto)


 


 


 2.5 


 1.8-7.8


10^3/uL


 


Lymphocytes # (Auto)


 


 


 0.2 L


 1.0-4.0


10^3/uL


 


Monocytes # (Auto)


 


 


 0.3 


 0.0-1.0


10^3/uL


 


Eosinophils # (Auto)


 


 


 0.0 


 0.0-0.3


10^3/uL


 


Basophils # (Auto)


 


 


 0.0 


 0.0-0.1


10^3/uL


 


Immature Granulocyte # (Auto)


 


 


 0.0 


 0.0-0.1


10^3/uL


 


Sodium Level   137  135-145  MMOL/L


 


Potassium Level   3.3 L 3.6-5.0  MMOL/L


 


Chloride Level   102    MMOL/L


 


Carbon Dioxide Level   26  21-32  MMOL/L


 


Anion Gap   9  5-14  MMOL/L


 


Blood Urea Nitrogen   8  7-18  MG/DL


 


Creatinine


 


 


 0.54 L


 0.60-1.30


MG/DL


 


Estimat Glomerular Filtration


Rate 


 


 131 


  





 


BUN/Creatinine Ratio   15   


 


Glucose Level   90    MG/DL


 


Calcium Level   8.6  8.5-10.1  MG/DL


 


Corrected Calcium   8.6  8.5-10.1  MG/DL


 


Total Bilirubin   0.3  0.1-1.0  MG/DL


 


Aspartate Amino Transf


(AST/SGOT) 


 


 11 


 5-34  U/L





 


Alanine Aminotransferase


(ALT/SGPT) 


 


 11 


 0-55  U/L





 


Alkaline Phosphatase   42    U/L


 


Total Protein   6.3 L 6.4-8.2  GM/DL


 


Albumin   4.0  3.2-4.5  GM/DL


 


Lipase   23  8-78  U/L








Riddhi MARIEVI A PA


Cbc With Automated Diff (22 13:45)


Comprehensive Metabolic Panel (22 13:45)


Lipase (22 13:45)


Covid 19 Inhouse Test (22 13:45)


Influenza A And B By Pcr (22 13:45)


Urinalysis (22 13:45)


Hcg,Qualitative Serum (22 13:45)


Ns Iv 1000 Ml (Sodium Chloride 0.9%) (22 13:45)


Ondansetron Injection (Zofran Injectio (22 13:45)


Ekg Tracing (22 13:55)


Chest 1 View, Ap/Pa Only (22 13:55)


Ketorolac Injection (Toradol Injection) (22 14:15)


Urine Culture (22 13:49)


Acetaminophen  Tablet (Tylenol  Tablet) (22 15:15)





Medications Given in ED





Current Medications








 Medications  Dose


 Ordered  Sig/Arturo


 Route  Start Time


 Stop Time Status Last Admin


Dose Admin


 


 Acetaminophen  500 mg  ONCE  ONCE


 PO  22 15:15


 22 15:16 DC 22 15:19


500 MG


 


 Ketorolac


 Tromethamine  30 mg  ONCE  ONCE


 IVP  22 14:15


 22 14:16 DC 22 14:15


30 MG


 


 Ondansetron HCl  4 mg  ONCE  ONCE


 IVP  22 13:45


 22 13:47 DC 22 14:01


4 MG








Vital Signs/I&O











 22





 13:28


 


Temp 37.2


 


Pulse 112


 


Resp 14


 


B/P (MAP) 114/83 (93)


 


Pulse Ox 98


 


O2 Delivery Room Air





Capillary Refill :





ECG


Comment


Sinus rhythm with sinus arrhythmia, 92 bpm, QRS duration 90 MS, QTc 388 MS





Departure


Communication (PCP)


Patient with multiple complaints.  Tested positive for COVID.  She is 

complaining of chest tightness which her EKG without any cardiac arrhythmia, ST 

elevation or depression.  Chest x-ray was negative for pneumonia, pneumothorax. 

White blood count 3.1.  She is not in acute kidney injury.  Normal liver 

enzymes.  Was given a liter of fluid and Zofran with improvement in nausea.  

Tolerate p.o. fluids.  Was given Toradol and ibuprofen for her body aches and 

pains and headache.'s provement of her symptoms.  No focal neural deficits.  She

is complaining of numbness and tingling sensation in her lower extremities.  

Good pulses equally bilateral.  No calf tenderness, swelling or bruising.  

Symptoms over the past week and a half.  She has adequate motor function.  

Recommend outpatient follow-up for further evaluation.  Cap refill less than 2. 

Good skin color tone.  Patient will be treated conservatively at this time.  She

is not COVID vaccinated.  Recommend isolation for next 10 days.  Will prescribe 

Zofran.  Recommend Tylenol ibuprofen for body aches pain and headache.  She has 

no meningeal signs.  Neuro exam unremarkable.  If any worsening symptoms such as

chest pain, shortness of breath, wheezing, decreased output return back to ED.  

Urinalysis was negative for pregnancy.  Concerning for secondary UTI.  Will 

discharge with Keflex.  Recheck with urinalysis in 5 days with PCP.  Patient 

heart rate improved to 90 bpm





Impression





   Primary Impression:  


   COVID-19


Disposition:  01 HOME, SELF-CARE


Condition:  Stable





Departure-Patient Inst.


Decision time for Depature:  15:18


Referrals:  


NO,LOCAL PHYSICIAN (PCP/Family)


Primary Care Physician


Patient Instructions:  COVID-19 (DC)





Add. Discharge Instructions:  





If any worsening symptoms such as chest pain, shortness of breath, decreased 

urine output, intractable vomiting to return back to ED. recommend isolating for

the next 10 days


All discharge instructions reviewed with patient and/or family. Voiced understan

ding.


Scripts


Cephalexin (Cephalexin) 500 Mg Tablet


500 MG PO BID for 7 Days, #14 TAB


   Prov: VI MARIE         22 


Ondansetron (Ondansetron Odt) 4 Mg Tab.rapdis


4 MG PO Q4H, #8 TAB


   Prov: VI MARIE         22











VI MARIE          Aug 17, 2022 13:47

## 2022-08-17 NOTE — DIAGNOSTIC IMAGING REPORT
EXAMINATION: Chest 1 view



HISTORY: Shortness of breath. Dizziness.



COMPARISON: 08/23/2021.



FINDINGS: 



The lung volumes are normal. No focal consolidation is seen. No

large pleural effusion or pneumothorax is seen. The

cardiomediastinal silhouette is normal in size and contour. No

acute osseous abnormality is seen.



IMPRESSION: 



1. No acute pleuroparenchymal process.



Dictated by: 



  Dictated on workstation # [id number]

## 2023-08-14 ENCOUNTER — HOSPITAL ENCOUNTER (EMERGENCY)
Dept: HOSPITAL 75 - ER | Age: 28
Discharge: LEFT BEFORE DISCHARGE | End: 2023-08-14
Payer: MEDICAID

## 2023-08-14 VITALS — SYSTOLIC BLOOD PRESSURE: 120 MMHG | DIASTOLIC BLOOD PRESSURE: 81 MMHG

## 2023-08-14 VITALS — BODY MASS INDEX: 20.28 KG/M2 | WEIGHT: 110.23 LBS | HEIGHT: 61.81 IN

## 2023-08-14 DIAGNOSIS — O20.9: Primary | ICD-10-CM

## 2023-08-14 DIAGNOSIS — Z3A.00: ICD-10-CM

## 2023-08-14 LAB
AMORPH SED URNS QL MICRO: (no result) /LPF
APTT PPP: YELLOW S
BACTERIA #/AREA URNS HPF: (no result) /HPF
BASOPHILS # BLD AUTO: 0.1 10^3/UL (ref 0–0.1)
BASOPHILS NFR BLD AUTO: 1 % (ref 0–10)
BILIRUB UR QL STRIP: NEGATIVE
EOSINOPHIL # BLD AUTO: 0.2 10^3/UL (ref 0–0.3)
EOSINOPHIL NFR BLD AUTO: 3 % (ref 0–10)
FIBRINOGEN PPP-MCNC: (no result) MG/DL
GLUCOSE UR STRIP-MCNC: NEGATIVE MG/DL
HCT VFR BLD CALC: 44 % (ref 35–52)
HGB BLD-MCNC: 14.8 G/DL (ref 11.5–16)
KETONES UR QL STRIP: NEGATIVE
LEUKOCYTE ESTERASE UR QL STRIP: (no result)
LYMPHOCYTES # BLD AUTO: 1.7 10^3/UL (ref 1–4)
LYMPHOCYTES NFR BLD AUTO: 27 % (ref 12–44)
MANUAL DIFFERENTIAL PERFORMED BLD QL: NO
MCH RBC QN AUTO: 31 PG (ref 25–34)
MCHC RBC AUTO-ENTMCNC: 34 G/DL (ref 32–36)
MCV RBC AUTO: 90 FL (ref 80–99)
MONOCYTES # BLD AUTO: 0.5 10^3/UL (ref 0–1)
MONOCYTES NFR BLD AUTO: 8 % (ref 0–12)
NEUTROPHILS # BLD AUTO: 3.8 10^3/UL (ref 1.8–7.8)
NEUTROPHILS NFR BLD AUTO: 61 % (ref 42–75)
NITRITE UR QL STRIP: NEGATIVE
PH UR STRIP: 7.5 [PH] (ref 5–9)
PLATELET # BLD: 244 10^3/UL (ref 130–400)
PMV BLD AUTO: 10.7 FL (ref 9–12.2)
PROT UR QL STRIP: NEGATIVE
RBC #/AREA URNS HPF: (no result) /HPF
SP GR UR STRIP: 1.01 (ref 1.02–1.02)
SQUAMOUS #/AREA URNS HPF: (no result) /HPF
WBC # BLD AUTO: 6.2 10^3/UL (ref 4.3–11)
WBC #/AREA URNS HPF: (no result) /HPF

## 2023-08-14 PROCEDURE — 85025 COMPLETE CBC W/AUTO DIFF WBC: CPT

## 2023-08-14 PROCEDURE — 86901 BLOOD TYPING SEROLOGIC RH(D): CPT

## 2023-08-14 PROCEDURE — 84702 CHORIONIC GONADOTROPIN TEST: CPT

## 2023-08-14 PROCEDURE — 81000 URINALYSIS NONAUTO W/SCOPE: CPT

## 2023-08-14 PROCEDURE — 84703 CHORIONIC GONADOTROPIN ASSAY: CPT

## 2023-08-14 PROCEDURE — 86900 BLOOD TYPING SEROLOGIC ABO: CPT

## 2023-08-14 PROCEDURE — 36415 COLL VENOUS BLD VENIPUNCTURE: CPT

## 2023-08-14 PROCEDURE — 87088 URINE BACTERIA CULTURE: CPT

## 2023-08-14 NOTE — ED GU-FEMALE
General


Chief Complaint:  OB < 20 WEEKS


Stated Complaint:  BLEEDING/+PREG TEST


Nursing Triage Note:  


PT TO RM 5 PT CO OF HAVING + PREG TEST YESTERDAY, AND HAS HAD SPOTTING SINCE 


FRIDAY. PT STATES NOT SURE OF LMP DATE.


Source:  patient


Exam Limitations:  no limitations





History of Present Illness


Date Seen by Provider:  Aug 14, 2023


Time Seen by Provider:  19:29


Initial Comments


Patient is a 27-year-old female who presents to the emergency room with a chief 

complaint of vaginal spotting since Friday, 4 days.  She states she cannot 

recall the date of her last menstrual cycle.  She says that she has not had a 

period in 2 or 3 months.  She states she is normally pretty regular.  She is a 

G3, P2 with 1 prior miscarriage.  If indeed pregnant today this would be her 

fourth pregnancy.  She states she took a home pregnancy test last night that was

positive.  She complains of some abdominal discomfort that is generalized.  She 

describes it as a "stretching".  She states it is not unilateral.  She denies 

any abnormal vaginal discharge.  No dysuria urgency or frequency.  She is not on

birth control.  She has been taking a prenatal vitamin and Prozac.  She believes

that "lifting" heavy objects at work is what contributed to her last 

miscarriage.  She states lifting at work currently seems to make her "spotting" 

a little worse


Timing/Duration:  other (3 days or so)


Severity/Quality:  mild (using a panty liner)


Radiation:  none


Activities at [place]:  other (work)


Prior Genitourinary Problems:  none


Sexual [place] History:  single partner


Modifying Factors:  Improves With Other (lifting worsens)


Associated Symptoms:  abdominal pain, nausea/vomiting





Allergies and Home Medications


Allergies


Coded Allergies:  


     kiwi (Unverified  Allergy, Unknown, 14)





Patient Home Medication List


Home Medication List Reviewed:  Yes


Cephalexin (Keflex) 500 Mg Capsule, 500 MG PO TID


   Prescribed by: PILO POLLARD on 20 1609


Cephalexin (Cephalexin) 500 Mg Tablet, 500 MG PO BID


   Prescribed by: GEN CISNEROS on 22 1519


Docusate Sodium (Dok) 100 Mg Capsule, 100 MG PO BID


   Prescribed by: NORMAN HATHAWAY on 20 1209


Ibuprofen (Ibuprofen) 800 Mg Tablet, 800 MG PO Q6HR


   Prescribed by: NORMAN HATHAWAY on 20 1209


Lorazepam (Ativan) 0.5 Mg Tablet, 0.5 MG PO TID PRN for ANXIETY


   Prescribed by: MATT BYNUM on 21


Nitrofurantoin Monohyd/M-Cryst (Macrobid 100 mg Capsule) 100 Mg Capsule, 1 TAB 

PO BID


   Prescribed by: JAMILAH MOSER on 21 0038


Ondansetron (Ondansetron Odt) 4 Mg Tab.rapdis, 4 MG PO Q4H


   Prescribed by: JAMILAH MOSER on 21 0035


Ondansetron (Ondansetron Odt) 4 Mg Tab.rapdis, 4 MG PO Q4H


   Prescribed by: GEN CISNEROS on 22 151


Oxycodone HCl/Acetaminophen (Percocet  mg Tablet) 1 Each Tablet, 1 TAB PO 

Q4HR PRN for PAINMODS


   Prescribed by: NORMAN HATHAWAY on 20 1209





Review of Systems


Review of Systems


Constitutional:  see HPI


Respiratory:  no symptoms reported, dyspnea on exertion


Gastrointestinal:  abdominal pain, nausea


Genitourinary:  other (vaginal spotting)


Pregnant:  Yes


Musculoskeletal:  no symptoms reported


Skin:  no symptoms reported





Past Medical-Social-Family Hx


Patient Social History


Tobacco Use?:  No


Substance use?:  No


Alcohol Use?:  No


Pt feels they are or have been:  No





Immunizations Up To Date


PED Vaccines UTD:  Yes


First/Initial COVID19 Vaccinat:  0


Second COVID19 Vaccination Micheal:  0


Third COVID19 Vaccination Date:  0





Seasonal Allergies


Seasonal Allergies:  Yes





Past Medical History


Surgery/Hospitalization HX:  


G-4 P-2 A-1, T AND A, 


Surgeries:  Yes (WISDOM TEETH REMOVED;  X 2; T&A)


Adenoidectomy,  Section, Tonsillectomy


Respiratory:  No


Cardiac:  No


Neurological:  No


Reproductive Disorders:  No


Female Reproductive Disorders:  Ovarian Cyst


Genitourinary:  No


Gastrointestinal:  No


Musculoskeletal:  No


Endocrine:  No


HEENT:  No


Cancer:  No


Psychosocial:  No


Integumentary:  No


Blood Disorders:  No


Adverse Reaction/Blood Tranf:  No





Family Medical History





Not obtainable due to adoption





Physical Exam


Vital Signs





Vital Signs - First Documented








 23





 18:20


 


Pulse 79


 


Resp 18


 


B/P (MAP) 120/81 (94)


 


Pulse Ox 97





Capillary Refill : Less Than 3 Seconds


Height, Weight, BMI


Height: 5'2.00"


Weight: 95lbs. 0.0oz. [id number]; 20.00 BMI


Method:Stated


General Appearance:  WD/WN, no apparent distress, thin


HEENT:  PERRL/EOMI


Cardiovascular:  regular rate, rhythm


Respiratory:  lungs clear, normal breath sounds, no respiratory distress, no 

accessory muscle use


Gastrointestinal:  normal bowel sounds, soft, tenderness (mild diffuse 

tenderness without rebound or involuntary guarding.)


Extremities:  normal range of motion, normal inspection


Neurologic/Psychiatric:  alert, normal mood/affect, oriented x 3


Skin:  normal color, warm/dry





Progress/Results/Core Measures


Suspected Sepsis


SIRS


Temperature: 


Pulse: 79 


Respiratory Rate: 18


 


Laboratory Tests


23 18:51: White Blood Count 6.2


Blood Pressure 120 /81 


Mean: 94


 











Laboratory Tests


23 18:51: Platelet Count 244





Results/Orders


Lab Results





Laboratory Tests








Test


 23


18:45 23


18:51 Range/Units


 


 


White Blood Count


 


 6.2 


 4.3-11.0


10^3/uL


 


Red Blood Count


 


 4.83 


 3.80-5.11


10^6/uL


 


Hemoglobin  14.8  11.5-16.0  g/dL


 


Hematocrit  44  35-52  %


 


Mean Corpuscular Volume  90  80-99  fL


 


Mean Corpuscular Hemoglobin  31  25-34  pg


 


Mean Corpuscular Hemoglobin


Concent 


 34 


 32-36  g/dL





 


Red Cell Distribution Width  12.1  10.0-14.5  %


 


Platelet Count


 


 244 


 130-400


10^3/uL


 


Mean Platelet Volume  10.7  9.0-12.2  fL


 


Immature Granulocyte % (Auto)  0   %


 


Neutrophils (%) (Auto)  61  42-75  %


 


Lymphocytes (%) (Auto)  27  12-44  %


 


Monocytes (%) (Auto)  8  0-12  %


 


Eosinophils (%) (Auto)  3  0-10  %


 


Basophils (%) (Auto)  1  0-10  %


 


Neutrophils # (Auto)


 


 3.8 


 1.8-7.8


10^3/uL


 


Lymphocytes # (Auto)


 


 1.7 


 1.0-4.0


10^3/uL


 


Monocytes # (Auto)


 


 0.5 


 0.0-1.0


10^3/uL


 


Eosinophils # (Auto)


 


 0.2 


 0.0-0.3


10^3/uL


 


Basophils # (Auto)


 


 0.1 


 0.0-0.1


10^3/uL


 


Immature Granulocyte # (Auto)


 


 0.0 


 0.0-0.1


10^3/uL








My Orders





Orders - ZAIDA SUN MD


Cbc With Automated Diff (23 18:30)


Ua Culture If Indicated (23 18:30)


Urine Pregnancy Bedside (23 18:30)


Hcg,Quantitative (23 18:30)


Abo Rh Type (23 18:30)





Vital Signs/I&O











 23





 18:20


 


Pulse 79


 


Resp 18


 


B/P (MAP) 120/81 (94)


 


Pulse Ox 97





Capillary Refill : Less Than 3 Seconds








Blood Pressure Mean:                    94








Progress Note :  


   Time:  19:09


Progress Note


Notified by the patient's nurse that she would like to "just go" and call back 

for lab results.  Her nurse explained that we are not like a routine clinic and 

we do not do follow up phone calls. She explained the risks of leaving - 

worsening bleeding, infection, miscarriage - possibly death.  The patient states

that she will just follow up at a later date. I was able to identify in the 

medical record that she is B + (has not ever had a blood transfusion).  

Therefore she would not need rhogam.  She did have a weakly positive bedside 

urine pregnancy test, however none of her other labs have resulted.  I did use 

bedside ultrasound to try and establish IUP, however there was no visible gestat

ional sac within the uterus.  She is still at risk for undiagnosed ectopic 

pregnancy.  Return precautions provided verbally by her ED Nurse as the patient 

did not want to wait for written d/c instructions.





Departure


Impression





   Primary Impression:  


   Bleeding in early pregnancy


Disposition:   AGAINST MEDICAL ADVICE


Condition:  Against Medical Advice





Departure-Patient Inst.


Referrals:  


NO,LOCAL PHYSICIAN (PCP/Family)


Primary Care Physician











ZAIDA SUN MD         Aug 14, 2023 18:29

## 2023-08-23 ENCOUNTER — HOSPITAL ENCOUNTER (EMERGENCY)
Dept: HOSPITAL 75 - ER | Age: 28
Discharge: HOME | End: 2023-08-23
Payer: MEDICAID

## 2023-08-23 VITALS — DIASTOLIC BLOOD PRESSURE: 85 MMHG | SYSTOLIC BLOOD PRESSURE: 113 MMHG

## 2023-08-23 VITALS — WEIGHT: 109.79 LBS | HEIGHT: 61.81 IN | BODY MASS INDEX: 20.2 KG/M2

## 2023-08-23 DIAGNOSIS — O03.9: Primary | ICD-10-CM

## 2023-08-23 DIAGNOSIS — F17.290: ICD-10-CM

## 2023-08-23 LAB
ALBUMIN SERPL-MCNC: 4.5 GM/DL (ref 3.2–4.5)
ALP SERPL-CCNC: 43 U/L (ref 40–136)
ALT SERPL-CCNC: 14 U/L (ref 0–55)
APTT PPP: (no result) S
BACTERIA #/AREA URNS HPF: NEGATIVE /HPF
BASOPHILS # BLD AUTO: 0 10^3/UL (ref 0–0.1)
BASOPHILS NFR BLD AUTO: 1 % (ref 0–10)
BILIRUB SERPL-MCNC: 0.3 MG/DL (ref 0.1–1)
BILIRUB UR QL STRIP: (no result)
BUN/CREAT SERPL: 10
CALCIUM SERPL-MCNC: 9.4 MG/DL (ref 8.5–10.1)
CHLORIDE SERPL-SCNC: 106 MMOL/L (ref 98–107)
CO2 SERPL-SCNC: 27 MMOL/L (ref 21–32)
CREAT SERPL-MCNC: 0.67 MG/DL (ref 0.6–1.3)
EOSINOPHIL # BLD AUTO: 0.1 10^3/UL (ref 0–0.3)
EOSINOPHIL NFR BLD AUTO: 2 % (ref 0–10)
FIBRINOGEN PPP-MCNC: (no result) MG/DL
GFR SERPLBLD BASED ON 1.73 SQ M-ARVRAT: 124 ML/MIN
GLUCOSE SERPL-MCNC: 94 MG/DL (ref 70–105)
GLUCOSE UR STRIP-MCNC: NEGATIVE MG/DL
HCT VFR BLD CALC: 39 % (ref 35–52)
HGB BLD-MCNC: 13.4 G/DL (ref 11.5–16)
KETONES UR QL STRIP: (no result)
LEUKOCYTE ESTERASE UR QL STRIP: NEGATIVE
LIPASE SERPL-CCNC: 33 U/L (ref 8–78)
LYMPHOCYTES # BLD AUTO: 1.2 10^3/UL (ref 1–4)
LYMPHOCYTES NFR BLD AUTO: 15 % (ref 12–44)
MANUAL DIFFERENTIAL PERFORMED BLD QL: NO
MCH RBC QN AUTO: 31 PG (ref 25–34)
MCHC RBC AUTO-ENTMCNC: 34 G/DL (ref 32–36)
MCV RBC AUTO: 90 FL (ref 80–99)
MONOCYTES # BLD AUTO: 0.5 10^3/UL (ref 0–1)
MONOCYTES NFR BLD AUTO: 6 % (ref 0–12)
NEUTROPHILS # BLD AUTO: 6.2 10^3/UL (ref 1.8–7.8)
NEUTROPHILS NFR BLD AUTO: 77 % (ref 42–75)
NITRITE UR QL STRIP: NEGATIVE
PH UR STRIP: 6 [PH] (ref 5–9)
PLATELET # BLD: 250 10^3/UL (ref 130–400)
PMV BLD AUTO: 10.8 FL (ref 9–12.2)
POTASSIUM SERPL-SCNC: 3.7 MMOL/L (ref 3.6–5)
PROT SERPL-MCNC: 6.4 GM/DL (ref 6.4–8.2)
PROT UR QL STRIP: (no result)
RBC #/AREA URNS HPF: (no result) /HPF
SODIUM SERPL-SCNC: 140 MMOL/L (ref 135–145)
SP GR UR STRIP: 1.02 (ref 1.02–1.02)
SQUAMOUS #/AREA URNS HPF: (no result) /HPF
WBC # BLD AUTO: 8 10^3/UL (ref 4.3–11)
WBC #/AREA URNS HPF: (no result) /HPF

## 2023-08-23 PROCEDURE — 81000 URINALYSIS NONAUTO W/SCOPE: CPT

## 2023-08-23 PROCEDURE — 36415 COLL VENOUS BLD VENIPUNCTURE: CPT

## 2023-08-23 PROCEDURE — 76817 TRANSVAGINAL US OBSTETRIC: CPT

## 2023-08-23 PROCEDURE — 80053 COMPREHEN METABOLIC PANEL: CPT

## 2023-08-23 PROCEDURE — 85025 COMPLETE CBC W/AUTO DIFF WBC: CPT

## 2023-08-23 PROCEDURE — 83690 ASSAY OF LIPASE: CPT

## 2023-08-23 PROCEDURE — 76801 OB US < 14 WKS SINGLE FETUS: CPT

## 2023-08-23 PROCEDURE — 84702 CHORIONIC GONADOTROPIN TEST: CPT

## 2023-08-23 NOTE — DIAGNOSTIC IMAGING REPORT
INDICATION: Pelvic pain and vaginal bleeding.



Uterus measures 9.2 x 4.2 x 5.1 cm. Endometrium is 9 mm in

thickness. There is no evidence of an intrauterine gestational

sac. Right ovary measures 2.8 x 2.5 x 1.6 cm left ovary measures

3.7 x 1.8 x 3.1 cm. Both ovaries demonstrate blood flow. There

are small follicles both ovaries. No adnexal mass or free fluid

is detected.



IMPRESSION: No evidence of intrauterine or ectopic pregnancy.



Dictated by: 



  Dictated on workstation # [id number]

## 2023-08-23 NOTE — ED GU-FEMALE
General


Chief Complaint:  OB < 20 WEEKS


Stated Complaint:  EARLY PREGNANCY 3-4 WEEKS | VAGINAL BLEEDING


Source:  patient, old records


Exam Limitations:  no limitations





History of Present Illness


Date Seen by Provider:  Aug 23, 2023


Time Seen by Provider:  09:45


Initial Comments


27-year-old female that is  coming in due to vaginal bleeding.  She had 

vaginal bleeding roughly 9 days ago that was more spotting.  She was seen in the

ER on , the bleeding stopped the next day.  She was at work earlier 

this morning, felt like she had urinated in her pants, went to the bathroom, and

it was blood.  She states its more blood than the most recent episode.  She is 

having some lower abdominal cramping as well and some mild nausea.  Denies any 

dysuria, flank pain, fever, vomiting, severe abdominal pain, rash, vaginal 

discharge, or any other concerns.  She has not had a confirmatory ultrasound as 

of yet but has one scheduled here in roughly a week.  She is otherwise denying 

any other acute complaints.





Allergies and Home Medications


Allergies


Coded Allergies:  


     kiwi (Unverified  Allergy, Unknown, 14)





Patient Home Medication List


Home Medication List Reviewed:  Yes


Cephalexin (Keflex) 500 Mg Capsule, 500 MG PO TID


   Prescribed by: PILO POLLARD on 20 1609


Cephalexin (Cephalexin) 500 Mg Tablet, 500 MG PO BID


   Prescribed by: GEN CISNEROS on 22 1519


Docusate Sodium (Dok) 100 Mg Capsule, 100 MG PO BID


   Prescribed by: NORMAN HATHAWAY on 20 1209


Ibuprofen (Ibuprofen) 800 Mg Tablet, 800 MG PO Q6HR


   Prescribed by: NORMAN HATHAWAY on 20 1209


Lorazepam (Ativan) 0.5 Mg Tablet, 0.5 MG PO TID PRN for ANXIETY


   Prescribed by: MATT BYNUM on 21


Nitrofurantoin Monohyd/M-Cryst (Macrobid 100 mg Capsule) 100 Mg Capsule, 1 TAB 

PO BID


   Prescribed by: JAMILAH MOSER on 21 0038


Ondansetron (Ondansetron Odt) 4 Mg Tab.rapdis, 4 MG PO Q4H


   Prescribed by: JAMILAH MOSER on 21 0035


Ondansetron (Ondansetron Odt) 4 Mg Tab.rapdis, 4 MG PO Q4H


   Prescribed by: GEN CISNEROS on 22 1519


Oxycodone HCl/Acetaminophen (Percocet  mg Tablet) 1 Each Tablet, 1 TAB PO 

Q4HR PRN for PAINMODS


   Prescribed by: NORMAN HATHAWAY on 20 1209





Review of Systems


Review of Systems


Constitutional:  No fever


EENTM:  no symptoms reported


Respiratory:  no symptoms reported


Cardiovascular:  no symptoms reported


Gastrointestinal:  see HPI


Genitourinary:  see HPI


Musculoskeletal:  no symptoms reported


Skin:  no symptoms reported


Psychiatric/Neurological:  No Symptoms Reported


Endocrine:  No Symptoms Reported





Past Medical-Social-Family Hx


Patient Social History


Tobacco Use?:  No


Use of E-Cig and/or Vaping dev:  Yes


E-Cig or Vaping type used:  Nicotine


Use of E-Cig and/or Vaping Tee:  Current Everyday User


Substance use?:  No


Alcohol Use?:  No


Pt feels they are or have been:  No





Immunizations Up To Date


PED Vaccines UTD:  Yes


First/Initial COVID19 Vaccinat:  0


Second COVID19 Vaccination Micheal:  0


Third COVID19 Vaccination Date:  0





Seasonal Allergies


Seasonal Allergies:  Yes





Past Medical History


Surgery/Hospitalization HX:  


G-4 P-2 A-1, T AND A, 


Surgeries:  Yes (WISDOM TEETH REMOVED;  X 2; T&A)


Adenoidectomy,  Section, Tonsillectomy


Respiratory:  No


Cardiac:  No


Neurological:  No


Reproductive Disorders:  No


Female Reproductive Disorders:  Ovarian Cyst


Genitourinary:  No


Gastrointestinal:  No


Musculoskeletal:  No


Endocrine:  No


HEENT:  No


Cancer:  No


Psychosocial:  No


Integumentary:  No


Blood Disorders:  No


Adverse Reaction/Blood Tranf:  No





Family Medical History





Not obtainable due to adoption





Physical Exam


Vital Signs





Vital Signs - First Documented








 23





 09:50


 


Temp 37.2


 


Pulse 95


 


Resp 16


 


B/P (MAP) 118/89 (99)


 


Pulse Ox 100





Capillary Refill : Less Than 3 Seconds


Height, Weight, BMI


Height: 5'2.00"


Weight: 95lbs. 0.0oz. [id number]; 20.00 BMI


Method:Stated


General Appearance:  WD/WN, no apparent distress


HEENT:  PERRL/EOMI, normal ENT inspection, pharynx normal


Neck:  non-tender, full range of motion, supple, normal inspection


Cardiovascular:  regular rate, rhythm, no edema, no murmur


Respiratory:  chest non-tender, lungs clear, normal breath sounds, no 

respiratory distress, no accessory muscle use


Gastrointestinal:  normal bowel sounds, non tender, soft; No distended, No 

guarding, No rebound


Back:  normal inspection, no CVA tenderness


Extremities:  normal range of motion, non-tender, normal inspection, no pedal 

edema, no calf tenderness, normal capillary refill


Neurologic/Psychiatric:  no motor/sensory deficits, alert, normal mood/affect


Skin:  normal color, warm/dry





Progress/Results/Core Measures


Suspected Sepsis


SIRS


Temperature: 


Pulse: 95 


Respiratory Rate: 16


 


Laboratory Tests


23 09:47: White Blood Count 8.0


Blood Pressure 118 /89 


Mean: 99


 











Laboratory Tests


23 09:47: 


Creatinine 0.67, Platelet Count 250, Total Bilirubin 0.3





Results/Orders


Lab Results





Laboratory Tests








Test


 23


09:47 23


10:11 Range/Units


 


 


White Blood Count


 8.0 


 


 4.3-11.0


10^3/uL


 


Red Blood Count


 4.37 


 


 3.80-5.11


10^6/uL


 


Hemoglobin 13.4   11.5-16.0  g/dL


 


Hematocrit 39   35-52  %


 


Mean Corpuscular Volume 90   80-99  fL


 


Mean Corpuscular Hemoglobin 31   25-34  pg


 


Mean Corpuscular Hemoglobin


Concent 34 


 


 32-36  g/dL





 


Red Cell Distribution Width 11.9   10.0-14.5  %


 


Platelet Count


 250 


 


 130-400


10^3/uL


 


Mean Platelet Volume 10.8   9.0-12.2  fL


 


Immature Granulocyte % (Auto) 0    %


 


Neutrophils (%) (Auto) 77 H  42-75  %


 


Lymphocytes (%) (Auto) 15   12-44  %


 


Monocytes (%) (Auto) 6   0-12  %


 


Eosinophils (%) (Auto) 2   0-10  %


 


Basophils (%) (Auto) 1   0-10  %


 


Neutrophils # (Auto)


 6.2 


 


 1.8-7.8


10^3/uL


 


Lymphocytes # (Auto)


 1.2 


 


 1.0-4.0


10^3/uL


 


Monocytes # (Auto)


 0.5 


 


 0.0-1.0


10^3/uL


 


Eosinophils # (Auto)


 0.1 


 


 0.0-0.3


10^3/uL


 


Basophils # (Auto)


 0.0 


 


 0.0-0.1


10^3/uL


 


Immature Granulocyte # (Auto)


 0.0 


 


 0.0-0.1


10^3/uL


 


Sodium Level 140   135-145  MMOL/L


 


Potassium Level 3.7   3.6-5.0  MMOL/L


 


Chloride Level 106     MMOL/L


 


Carbon Dioxide Level 27   21-32  MMOL/L


 


Anion Gap 7   5-14  MMOL/L


 


Blood Urea Nitrogen 7   7-18  MG/DL


 


Creatinine


 0.67 


 


 0.60-1.30


MG/DL


 


Estimat Glomerular Filtration


Rate 124 


 


  





 


BUN/Creatinine Ratio 10    


 


Glucose Level 94     MG/DL


 


Calcium Level 9.4   8.5-10.1  MG/DL


 


Corrected Calcium 9.0   8.5-10.1  MG/DL


 


Total Bilirubin 0.3   0.1-1.0  MG/DL


 


Aspartate Amino Transf


(AST/SGOT) 18 


 


 5-34  U/L





 


Alanine Aminotransferase


(ALT/SGPT) 14 


 


 0-55  U/L





 


Alkaline Phosphatase 43     U/L


 


Total Protein 6.4   6.4-8.2  GM/DL


 


Albumin 4.5   3.2-4.5  GM/DL


 


Lipase 33   8-78  U/L


 


Human Chorionic Gonadotropin,


Quant 16 H


 


 <5  MIU/ML





 


Urine Color  RED H  


 


Urine Clarity  TURBID H  


 


Urine pH  6.0  5-9  


 


Urine Specific Gravity  1.020  1.016-1.022  


 


Urine Protein  3+ H NEGATIVE  


 


Urine Glucose (UA)  NEGATIVE  NEGATIVE  


 


Urine Ketones  1+ H NEGATIVE  


 


Urine Nitrite  NEGATIVE  NEGATIVE  


 


Urine Bilirubin  2+ H NEGATIVE  


 


Urine Urobilinogen  1.0  < = 1.0  MG/DL


 


Urine Leukocyte Esterase  NEGATIVE  NEGATIVE  


 


Urine RBC (Auto)  3+ H NEGATIVE  


 


Urine RBC  TNTC H  /HPF


 


Urine WBC  NONE   /HPF


 


Urine Squamous Epithelial


Cells 


 RARE 


  /HPF





 


Urine Crystals  NONE   /LPF


 


Urine Bacteria  NEGATIVE   /HPF


 


Urine Casts  NONE   /LPF


 


Urine Mucus  NEGATIVE   /LPF


 


Urine Culture Indicated  NO   








My Orders





Orders - VI JOHNS MD


Cbc With Automated Diff (23 10:05)


Comprehensive Metabolic Panel (23 10:05)


Hcg,Quantitative (23 10:05)


Lipase (23 10:05)


Ua Culture If Indicated (23 10:05)


Ed Iv/Invasive Line Start (23 10:05)


Acetaminophen  Tablet (Acetaminophen  Ta (23 10:15)


Us Ob<14 Wks Sngle W/Transvag (23 10:05)





Medications Given in ED





Current Medications








 Medications  Dose


 Ordered  Sig/Arturo


 Route  Start Time


 Stop Time Status Last Admin


Dose Admin


 


 Acetaminophen  1,000 mg  ONCE  ONCE


 PO  23 10:15


 23 10:16 DC 23 10:18


1,000 MG








Vital Signs/I&O











 23





 09:50


 


Temp 37.2


 


Pulse 95


 


Resp 16


 


B/P (MAP) 118/89 (99)


 


Pulse Ox 100





Capillary Refill : Less Than 3 Seconds


Progress Note :  


Progress Note


27-year-old female with above history coming in due to vaginal bleeding while 

pregnant.  ABCs were intact and vitals are stable on presentation.  Physical 

exam reassuring including a soft and nontender abdomen.  I did a point-of-care 

ultrasound, I was unable to identify an intrauterine pregnancy.  An IV was 

placed and basic labs were obtained and were significant for normal hemoglobin, 

normal white blood cell count, normal creatinine, and unfortunately her beta-hCG

is dropping from her prior which is consistent with a miscarriage.  We did a 

transvaginal ultrasound here and there was not an ectopic pregnancy and also no 

intrauterine pregnancy at that time.  She is likely either completed the 

miscarriage or is going to shortly.  She is not having a significant amount of 

bleeding that is clinically significant.  Additionally, she is Rh+ and does not 

require RhoGAM.  I believe she is stable for discharge with outpatient follow-

up.  She was sent home with strict return precautions





Diagnostic Imaging





   Diagonstic Imaging:  Ultrasound (transvag/OB)





Departure


Impression





   Primary Impression:  


   Miscarriage


Disposition:   HOME, SELF-CARE


Condition:  Stable





Departure-Patient Inst.


Decision time for Depature:  11:30


Referrals:  


NO,LOCAL PHYSICIAN (PCP/Family)


Primary Care Physician


Patient Instructions:  Miscarriage (DC)





Add. Discharge Instructions:  


Unfortunately your beta HCG was 142 on  and is only 16 today.  We would 

expect this number to be much larger, and the fact that it dropped is consistent

with a miscarriage.  Additionally, your ultrasound did not show a pregnancy with

in your uterus or pregnancy in any unwanted areas as well such as around your 

ovaries.  You can take Tylenol and/or ibuprofen as needed for cramping and 

discomfort.  Expect this to be like a very heavy period.  If you have enough 

bleeding that you are saturating a maxi pad every hour for several hours, we 

will need to be evaluated again by a doctor.  Please contact your OB to discuss 

the next steps.  We are so sorry for this news.


Work/School Note:  Work Release Form   Date Seen in the Emergency Department:  

Aug 23, 2023


   Return to Work:  Aug 28, 2023


   Restrictions:  No Restrictions











VI JOHNS MD          Aug 23, 2023 10:09